# Patient Record
Sex: FEMALE | Race: WHITE | NOT HISPANIC OR LATINO
[De-identification: names, ages, dates, MRNs, and addresses within clinical notes are randomized per-mention and may not be internally consistent; named-entity substitution may affect disease eponyms.]

---

## 2017-12-12 ENCOUNTER — APPOINTMENT (OUTPATIENT)
Dept: ORTHOPEDIC SURGERY | Facility: CLINIC | Age: 82
End: 2017-12-12
Payer: MEDICARE

## 2017-12-12 VITALS — BODY MASS INDEX: 29.44 KG/M2 | WEIGHT: 160 LBS | HEIGHT: 62 IN

## 2017-12-12 DIAGNOSIS — Z78.9 OTHER SPECIFIED HEALTH STATUS: ICD-10-CM

## 2017-12-12 DIAGNOSIS — Q68.1 CONGENITAL DEFORMITY OF FINGER(S) AND HAND: ICD-10-CM

## 2017-12-12 PROCEDURE — 99203 OFFICE O/P NEW LOW 30 MIN: CPT | Mod: 25

## 2017-12-12 PROCEDURE — 20550 NJX 1 TENDON SHEATH/LIGAMENT: CPT | Mod: FA

## 2018-03-13 ENCOUNTER — APPOINTMENT (OUTPATIENT)
Dept: ORTHOPEDIC SURGERY | Facility: CLINIC | Age: 83
End: 2018-03-13
Payer: MEDICARE

## 2018-03-13 PROCEDURE — 99214 OFFICE O/P EST MOD 30 MIN: CPT

## 2018-03-30 ENCOUNTER — OUTPATIENT (OUTPATIENT)
Dept: OUTPATIENT SERVICES | Facility: HOSPITAL | Age: 83
LOS: 1 days | End: 2018-03-30
Payer: MEDICARE

## 2018-03-30 VITALS
HEIGHT: 62 IN | TEMPERATURE: 98 F | SYSTOLIC BLOOD PRESSURE: 176 MMHG | RESPIRATION RATE: 18 BRPM | OXYGEN SATURATION: 96 % | DIASTOLIC BLOOD PRESSURE: 80 MMHG | HEART RATE: 64 BPM

## 2018-03-30 DIAGNOSIS — Z01.818 ENCOUNTER FOR OTHER PREPROCEDURAL EXAMINATION: ICD-10-CM

## 2018-03-30 DIAGNOSIS — M65.312 TRIGGER THUMB, LEFT THUMB: ICD-10-CM

## 2018-03-30 DIAGNOSIS — Z98.890 OTHER SPECIFIED POSTPROCEDURAL STATES: Chronic | ICD-10-CM

## 2018-03-30 PROCEDURE — G0463: CPT

## 2018-03-30 PROCEDURE — 80048 BASIC METABOLIC PNL TOTAL CA: CPT

## 2018-03-30 PROCEDURE — 93010 ELECTROCARDIOGRAM REPORT: CPT

## 2018-03-30 PROCEDURE — 85027 COMPLETE CBC AUTOMATED: CPT

## 2018-03-30 PROCEDURE — 93005 ELECTROCARDIOGRAM TRACING: CPT

## 2018-03-30 RX ORDER — LIDOCAINE HCL 20 MG/ML
0.2 VIAL (ML) INJECTION ONCE
Qty: 0 | Refills: 0 | Status: DISCONTINUED | OUTPATIENT
Start: 2018-04-06 | End: 2018-04-21

## 2018-03-30 RX ORDER — SODIUM CHLORIDE 9 MG/ML
3 INJECTION INTRAMUSCULAR; INTRAVENOUS; SUBCUTANEOUS EVERY 8 HOURS
Qty: 0 | Refills: 0 | Status: DISCONTINUED | OUTPATIENT
Start: 2018-04-06 | End: 2018-04-21

## 2018-03-30 RX ORDER — FAMOTIDINE 10 MG/ML
0 INJECTION INTRAVENOUS
Qty: 0 | Refills: 0 | COMMUNITY

## 2018-03-30 NOTE — H&P PST ADULT - NSANTHOSAYNRD_GEN_A_CORE
No. BREA screening performed.  STOP BANG Legend: 0-2 = LOW Risk; 3-4 = INTERMEDIATE Risk; 5-8 = HIGH Risk

## 2018-03-30 NOTE — H&P PST ADULT - ATTENDING COMMENTS
The patient has chronic painful left trigger thumb that is locked in flexion and causing considerable interference with a wide range of ADL’s. Surgery for left thumb trigger finger is indicated. The patient has pain and interference with activities of daily living. While the patient may see improvement, the patient may not have complete range of motion or complete return to activities of daily living. Postoperative hand therapy, and other treatment modalities may be required The range of treatment alternatives, and the associated risks complications limitations and expectations were explained and discussed. Infection, incomplete range of motion, stiffness, pain, palmar fibromatosis are a just few of many factors reviewed and discussed with the patient. All questions have been answered. The patient has expressed acceptance and understanding of the above and has consented to surgery.the patient.

## 2018-03-30 NOTE — H&P PST ADULT - PMH
Anxiety    Discogenic low back pain    HLD (hyperlipidemia)    Hypertension    Osteoporosis    Stroke  [id number] minor

## 2018-04-05 ENCOUNTER — TRANSCRIPTION ENCOUNTER (OUTPATIENT)
Age: 83
End: 2018-04-05

## 2018-04-06 ENCOUNTER — OUTPATIENT (OUTPATIENT)
Dept: OUTPATIENT SERVICES | Facility: HOSPITAL | Age: 83
LOS: 1 days | End: 2018-04-06
Payer: MEDICARE

## 2018-04-06 ENCOUNTER — APPOINTMENT (OUTPATIENT)
Dept: ORTHOPEDIC SURGERY | Facility: HOSPITAL | Age: 83
End: 2018-04-06

## 2018-04-06 VITALS
SYSTOLIC BLOOD PRESSURE: 165 MMHG | HEART RATE: 62 BPM | TEMPERATURE: 98 F | DIASTOLIC BLOOD PRESSURE: 77 MMHG | RESPIRATION RATE: 18 BRPM

## 2018-04-06 VITALS
SYSTOLIC BLOOD PRESSURE: 153 MMHG | OXYGEN SATURATION: 98 % | TEMPERATURE: 98 F | RESPIRATION RATE: 16 BRPM | HEART RATE: 66 BPM | HEIGHT: 62 IN | DIASTOLIC BLOOD PRESSURE: 88 MMHG

## 2018-04-06 DIAGNOSIS — Z98.890 OTHER SPECIFIED POSTPROCEDURAL STATES: Chronic | ICD-10-CM

## 2018-04-06 DIAGNOSIS — M65.312 TRIGGER THUMB, LEFT THUMB: ICD-10-CM

## 2018-04-06 DIAGNOSIS — Z01.818 ENCOUNTER FOR OTHER PREPROCEDURAL EXAMINATION: ICD-10-CM

## 2018-04-06 PROCEDURE — 26055 INCISE FINGER TENDON SHEATH: CPT | Mod: FA

## 2018-04-06 RX ORDER — ACETAMINOPHEN 500 MG
1000 TABLET ORAL ONCE
Qty: 0 | Refills: 0 | Status: COMPLETED | OUTPATIENT
Start: 2018-04-06 | End: 2018-04-06

## 2018-04-06 RX ORDER — CELECOXIB 200 MG/1
200 CAPSULE ORAL ONCE
Qty: 0 | Refills: 0 | Status: COMPLETED | OUTPATIENT
Start: 2018-04-06 | End: 2018-04-06

## 2018-04-06 RX ADMIN — CELECOXIB 200 MILLIGRAM(S): 200 CAPSULE ORAL at 07:41

## 2018-04-06 RX ADMIN — Medication 1000 MILLIGRAM(S): at 07:41

## 2018-04-06 NOTE — ASU DISCHARGE PLAN (ADULT/PEDIATRIC). - NOTIFY
see instruction sheet Numbness, color, or temperature change to extremity/Fever greater than 101/Persistent Nausea and Vomiting/Bleeding that does not stop/Unable to Urinate/Pain not relieved by Medications/see instruction sheet

## 2018-04-06 NOTE — BRIEF OPERATIVE NOTE - PROCEDURE
<<-----Click on this checkbox to enter Procedure Trigger finger release  04/06/2018  L thumb  Active  PGOLD2

## 2018-04-06 NOTE — ASU DISCHARGE PLAN (ADULT/PEDIATRIC). - MEDICATION SUMMARY - MEDICATIONS TO TAKE
I will START or STAY ON the medications listed below when I get home from the hospital:    Aspir 81 oral delayed release tablet  -- 1 tab(s) by mouth once a day  -- Indication: For home med    losartan 100 mg oral tablet  -- 1 tab(s) by mouth once a day  -- Indication: For home med    simvastatin 40 mg oral tablet  -- 1 tab(s) by mouth once a day (at bedtime)  -- Indication: For home med    Metoprolol Succinate ER 50 mg oral tablet, extended release  -- 1 tab(s) by mouth once a day  -- Indication: For home med    famotidine 20 mg oral tablet  -- orally once a day pm and am of surgery  -- Indication: For home med    Calcium 600+D 600 mg-200 intl units oral tablet  -- orally once a day  -- Indication: For home med

## 2018-04-06 NOTE — ASU PATIENT PROFILE, ADULT - PMH
Anxiety    Discogenic low back pain    HLD (hyperlipidemia)    Hypertension    Osteoporosis    Stroke  [id number] minor

## 2018-04-16 ENCOUNTER — APPOINTMENT (OUTPATIENT)
Dept: ORTHOPEDIC SURGERY | Facility: CLINIC | Age: 83
End: 2018-04-16
Payer: MEDICARE

## 2018-04-16 DIAGNOSIS — M65.312 TRIGGER THUMB, LEFT THUMB: ICD-10-CM

## 2018-04-16 PROCEDURE — 99024 POSTOP FOLLOW-UP VISIT: CPT

## 2019-01-23 PROBLEM — M81.0 AGE-RELATED OSTEOPOROSIS WITHOUT CURRENT PATHOLOGICAL FRACTURE: Chronic | Status: ACTIVE | Noted: 2018-03-30

## 2019-01-23 PROBLEM — F41.9 ANXIETY DISORDER, UNSPECIFIED: Chronic | Status: ACTIVE | Noted: 2018-03-30

## 2019-01-23 PROBLEM — M51.36 OTHER INTERVERTEBRAL DISC DEGENERATION, LUMBAR REGION: Chronic | Status: ACTIVE | Noted: 2018-03-30

## 2019-01-23 PROBLEM — I10 ESSENTIAL (PRIMARY) HYPERTENSION: Chronic | Status: ACTIVE | Noted: 2018-03-30

## 2019-02-06 ENCOUNTER — APPOINTMENT (OUTPATIENT)
Dept: ORTHOPEDIC SURGERY | Facility: CLINIC | Age: 84
End: 2019-02-06
Payer: MEDICARE

## 2019-02-06 VITALS
HEIGHT: 62 IN | HEART RATE: 58 BPM | SYSTOLIC BLOOD PRESSURE: 202 MMHG | BODY MASS INDEX: 30.91 KG/M2 | WEIGHT: 168 LBS | DIASTOLIC BLOOD PRESSURE: 94 MMHG

## 2019-02-06 PROCEDURE — 72170 X-RAY EXAM OF PELVIS: CPT | Mod: 59

## 2019-02-06 PROCEDURE — 72110 X-RAY EXAM L-2 SPINE 4/>VWS: CPT

## 2019-02-06 PROCEDURE — 20610 DRAIN/INJ JOINT/BURSA W/O US: CPT | Mod: LT

## 2019-02-06 PROCEDURE — 99214 OFFICE O/P EST MOD 30 MIN: CPT | Mod: 25

## 2019-02-06 RX ORDER — NORTRIPTYLINE HYDROCHLORIDE 10 MG/1
10 CAPSULE ORAL
Qty: 30 | Refills: 0 | Status: DISCONTINUED | COMMUNITY
Start: 2017-06-30 | End: 2019-02-06

## 2019-02-06 NOTE — HISTORY OF PRESENT ILLNESS
[Worsening] : worsening [9] : a current pain level of 9/10 [Walking] : walking [Daily] : ~He/She~ states the symptoms seem to be occuring daily [Exercise Regimen] : relieved by exercise regimen [Recumbency] : relieved by recumbency [Rest] : relieved by rest [de-identified] : Patient is here today for evaluation on her chronic low back into left hip and leg pain going on acute since June 2018. Patient saw 2 orthopedist since June. Patient states had injection in her back no relief by Dr. Dent then went to [facility] saw orthopedist sent for mri lumbar spine. Patient states after walking a lot of tightness in her left thigh also when lying down in bed.

## 2019-02-06 NOTE — PHYSICAL EXAM
[Antalgic] : antalgic [Stooped] : stooped [SLR] : negative straight leg raise [LE] : Sensory: Intact in bilateral lower extremities [Knee] : patellar 2+ and symmetric bilaterally [Ankle] : ankle 2+ and symmetric bilaterally [DP] : dorsalis pedis 2+ and symmetric bilaterally [PT] : posterior tibial 2+ and symmetric bilaterally [de-identified] : The pt is awake, alert and oriented to self, place and time, is uncomfortable and in no acute distress. Inspection of neck, back and lower extremities bilaterally reveals no rashes or ecchymotic lesions.  There is no tenderness over the cervical, thoracic spine, or the upper and lower extremities musculature.Paraspinal lumbar tenderness with midline lumbar tenderness noted.  There is no sacroiliac tenderness. left greater trochanteric tenderness on the left. No atrophy or abnormal movements noted in the upper or lower extremities. There is no swelling noted in the upper or lower extremities bilaterally. No cervical lymphadenopathy noted anteriorly. No joint laxity noted in the upper and lower extremity joints bilaterally.\par Hip range of motion is degrees internal rotation 30° external rotation without pain. Full range of motion of the shoulders bilaterally with no significant pain\par There is no groin pain with hip internal rotation and a negative CRISTA test bilaterally.  [de-identified] : seen with her  and her friend [de-identified] : AP pelvis demonstrates mild degenerative changes in the hips. No acute fractures. No significant degeneration. Right-sided scoliosis and lumbar spines partially visualized.\par \par 4 views lumbar spine obtained today demonstrate 33° of right-sided lumbar scoliosis from T12-L4. Partial sacralization of the L5 vertebral body pseudoarticulation seen on the right side primarily. On the lateral projections forward posture is noted with significant degeneration L5-S1. Moderate degeneration also L4-5. Grade 1-2 spinal listhesis seen at L3-4. Grade 1 spinal listhesis also seen at L2-3. Significant degeneration seen at L2-3 L3-4. Between flex extension no dynamic instability. No acute fractures.\par \par _____________________\par \par MRI lumbar spine without contrast performed at [facility] on 129 2018 was compared by the radiologist with previous MRI from the May 21st 2015. Mild anterolisthesis of L2 on 3 and L3-L4. Right-sided lumbar scoliosis in the upper left-sided lumbar scoliosis lower spine. Multilevel disc degeneration with disc bulges at L1-2, L2-3 L3-4 and L4-5 there moderate canal narrowing L1-L2 3. Moderate to marked canal narrowing at L3-4.

## 2019-02-06 NOTE — DISCUSSION/SUMMARY
[Medication Risks Reviewed] : Medication risks reviewed [de-identified] : The patient today presents with symptoms of localizing tenderness over her left lateral thigh and greater trochanter. She has difficulty laying on that side at night. Clinically she has trochanteric bursitis and I offered her an injection of corticosteroids for this. She wanted to proceed. Under sterile conditions 40 mg of Depo-Medrol is injected her left trochanteric bursa next the basic cc solution of 1% lidocaine and 0.25% Marcaine without epinephrine.\par \par The patient also has foraminal and lateral recess stenosis with herniation of the L3-4 level and moderate to significant stenosis at the L3-4 and L2-3 levels. Talked about epidural steroid injections for this especially given her groin and anterior thigh pain localizing to the L3 and L4 nerve distributions. We will schedule an injection at her earliest convenience. She understands the injection is not helpful laminectomy may be an option for her.\par \par The patient also was prescribed gabapentin meloxicam on a trial basis. She denies any NSAID allergy and is currently taking baby aspirin  for cardiovascular prophylaxis.\par \par The patient was educated regarding their condition, treatment options as well as prescribed course of treatment. \par Risks and benefits as well as alternatives to the proposed treatment were also provided to the patient \par They were given the opportunity to have all their questions answered to their satisfaction.\par \par Vital signs were reviewed with the patient and the patient was instructed to followup with their primary care provider for further management.\par \par Healthy lifestyle recommendations were also made including a tobacco free lifestyle, proper diet, and weight control.

## 2019-02-12 ENCOUNTER — APPOINTMENT (OUTPATIENT)
Dept: ORTHOPEDIC SURGERY | Facility: HOSPITAL | Age: 84
End: 2019-02-12

## 2019-02-14 ENCOUNTER — APPOINTMENT (OUTPATIENT)
Dept: ORTHOPEDIC SURGERY | Facility: HOSPITAL | Age: 84
End: 2019-02-14

## 2019-02-14 ENCOUNTER — OUTPATIENT (OUTPATIENT)
Dept: OUTPATIENT SERVICES | Facility: HOSPITAL | Age: 84
LOS: 1 days | End: 2019-02-14
Payer: MEDICARE

## 2019-02-14 DIAGNOSIS — M43.10 SPONDYLOLISTHESIS, SITE UNSPECIFIED: ICD-10-CM

## 2019-02-14 DIAGNOSIS — M54.16 RADICULOPATHY, LUMBAR REGION: ICD-10-CM

## 2019-02-14 DIAGNOSIS — M48.061 SPINAL STENOSIS, LUMBAR REGION WITHOUT NEUROGENIC CLAUDICATION: ICD-10-CM

## 2019-02-14 DIAGNOSIS — Z98.890 OTHER SPECIFIED POSTPROCEDURAL STATES: Chronic | ICD-10-CM

## 2019-02-14 PROCEDURE — 64483 NJX AA&/STRD TFRM EPI L/S 1: CPT | Mod: LT

## 2019-02-14 PROCEDURE — 64484 NJX AA&/STRD TFRM EPI L/S EA: CPT | Mod: LT

## 2019-02-14 PROCEDURE — 77003 FLUOROGUIDE FOR SPINE INJECT: CPT

## 2019-03-13 ENCOUNTER — APPOINTMENT (OUTPATIENT)
Dept: ORTHOPEDIC SURGERY | Facility: CLINIC | Age: 84
End: 2019-03-13
Payer: MEDICARE

## 2019-03-13 VITALS
DIASTOLIC BLOOD PRESSURE: 81 MMHG | SYSTOLIC BLOOD PRESSURE: 187 MMHG | WEIGHT: 167 LBS | HEART RATE: 63 BPM | BODY MASS INDEX: 30.73 KG/M2 | HEIGHT: 62 IN

## 2019-03-13 PROCEDURE — 99214 OFFICE O/P EST MOD 30 MIN: CPT

## 2019-03-13 NOTE — HISTORY OF PRESENT ILLNESS
[Stable] : stable [4] : a current pain level of 4/10 [Walking] : walking [Daily] : ~He/She~ states the symptoms seem to be occuring daily [de-identified] : Patient is here today one month post lumbar epidural injection 2/14/19. Patient states slightly better still getting left buttock into left leg pain. Patient finished her medication. [de-identified] : lumbar injection

## 2019-03-13 NOTE — REASON FOR VISIT
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

## 2019-03-26 ENCOUNTER — OUTPATIENT (OUTPATIENT)
Dept: OUTPATIENT SERVICES | Facility: HOSPITAL | Age: 84
LOS: 1 days | End: 2019-03-26
Payer: MEDICARE

## 2019-03-26 ENCOUNTER — APPOINTMENT (OUTPATIENT)
Dept: ORTHOPEDIC SURGERY | Facility: HOSPITAL | Age: 84
End: 2019-03-26

## 2019-03-26 DIAGNOSIS — M54.16 RADICULOPATHY, LUMBAR REGION: ICD-10-CM

## 2019-03-26 DIAGNOSIS — M43.10 SPONDYLOLISTHESIS, SITE UNSPECIFIED: ICD-10-CM

## 2019-03-26 DIAGNOSIS — Z98.890 OTHER SPECIFIED POSTPROCEDURAL STATES: Chronic | ICD-10-CM

## 2019-03-26 DIAGNOSIS — M48.061 SPINAL STENOSIS, LUMBAR REGION WITHOUT NEUROGENIC CLAUDICATION: ICD-10-CM

## 2019-03-26 PROCEDURE — 64483 NJX AA&/STRD TFRM EPI L/S 1: CPT | Mod: LT

## 2019-03-26 PROCEDURE — 77003 FLUOROGUIDE FOR SPINE INJECT: CPT

## 2019-03-26 PROCEDURE — 64484 NJX AA&/STRD TFRM EPI L/S EA: CPT | Mod: LT

## 2019-04-08 ENCOUNTER — APPOINTMENT (OUTPATIENT)
Dept: ORTHOPEDIC SURGERY | Facility: CLINIC | Age: 84
End: 2019-04-08
Payer: MEDICARE

## 2019-04-08 VITALS — HEART RATE: 65 BPM | SYSTOLIC BLOOD PRESSURE: 177 MMHG | DIASTOLIC BLOOD PRESSURE: 84 MMHG

## 2019-04-08 PROCEDURE — 99214 OFFICE O/P EST MOD 30 MIN: CPT

## 2019-05-06 ENCOUNTER — CHART COPY (OUTPATIENT)
Age: 84
End: 2019-05-06

## 2019-05-15 ENCOUNTER — OUTPATIENT (OUTPATIENT)
Dept: OUTPATIENT SERVICES | Facility: HOSPITAL | Age: 84
LOS: 1 days | End: 2019-05-15
Payer: MEDICARE

## 2019-05-15 VITALS
WEIGHT: 164.91 LBS | TEMPERATURE: 98 F | OXYGEN SATURATION: 96 % | HEART RATE: 68 BPM | HEIGHT: 62 IN | RESPIRATION RATE: 15 BRPM | DIASTOLIC BLOOD PRESSURE: 83 MMHG | SYSTOLIC BLOOD PRESSURE: 176 MMHG

## 2019-05-15 DIAGNOSIS — Z01.818 ENCOUNTER FOR OTHER PREPROCEDURAL EXAMINATION: ICD-10-CM

## 2019-05-15 DIAGNOSIS — M54.16 RADICULOPATHY, LUMBAR REGION: ICD-10-CM

## 2019-05-15 DIAGNOSIS — M51.36 OTHER INTERVERTEBRAL DISC DEGENERATION, LUMBAR REGION: ICD-10-CM

## 2019-05-15 DIAGNOSIS — M43.10 SPONDYLOLISTHESIS, SITE UNSPECIFIED: ICD-10-CM

## 2019-05-15 DIAGNOSIS — Z98.49 CATARACT EXTRACTION STATUS, UNSPECIFIED EYE: Chronic | ICD-10-CM

## 2019-05-15 DIAGNOSIS — Z98.890 OTHER SPECIFIED POSTPROCEDURAL STATES: Chronic | ICD-10-CM

## 2019-05-15 LAB
ALBUMIN SERPL ELPH-MCNC: 3.9 G/DL — SIGNIFICANT CHANGE UP (ref 3.3–5)
ALLERGY+IMMUNOLOGY DIAG STUDY NOTE: SIGNIFICANT CHANGE UP
ALP SERPL-CCNC: 59 U/L — SIGNIFICANT CHANGE UP (ref 30–120)
ALT FLD-CCNC: 24 U/L DA — SIGNIFICANT CHANGE UP (ref 10–60)
ANION GAP SERPL CALC-SCNC: 9 MMOL/L — SIGNIFICANT CHANGE UP (ref 5–17)
APTT BLD: 31.5 SEC — SIGNIFICANT CHANGE UP (ref 28.5–37)
AST SERPL-CCNC: 30 U/L — SIGNIFICANT CHANGE UP (ref 10–40)
BILIRUB SERPL-MCNC: 0.5 MG/DL — SIGNIFICANT CHANGE UP (ref 0.2–1.2)
BUN SERPL-MCNC: 28 MG/DL — HIGH (ref 7–23)
CALCIUM SERPL-MCNC: 9.3 MG/DL — SIGNIFICANT CHANGE UP (ref 8.4–10.5)
CHLORIDE SERPL-SCNC: 110 MMOL/L — HIGH (ref 96–108)
CO2 SERPL-SCNC: 29 MMOL/L — SIGNIFICANT CHANGE UP (ref 22–31)
CREAT SERPL-MCNC: 0.64 MG/DL — SIGNIFICANT CHANGE UP (ref 0.5–1.3)
GLUCOSE SERPL-MCNC: 106 MG/DL — HIGH (ref 70–99)
HCT VFR BLD CALC: 41.2 % — SIGNIFICANT CHANGE UP (ref 34.5–45)
HGB BLD-MCNC: 13.8 G/DL — SIGNIFICANT CHANGE UP (ref 11.5–15.5)
INR BLD: 0.97 RATIO — SIGNIFICANT CHANGE UP (ref 0.88–1.16)
MCHC RBC-ENTMCNC: 33.3 PG — SIGNIFICANT CHANGE UP (ref 27–34)
MCHC RBC-ENTMCNC: 33.5 GM/DL — SIGNIFICANT CHANGE UP (ref 32–36)
MCV RBC AUTO: 99.3 FL — SIGNIFICANT CHANGE UP (ref 80–100)
NRBC # BLD: 0 /100 WBCS — SIGNIFICANT CHANGE UP (ref 0–0)
PLATELET # BLD AUTO: 190 K/UL — SIGNIFICANT CHANGE UP (ref 150–400)
POTASSIUM SERPL-MCNC: 4.5 MMOL/L — SIGNIFICANT CHANGE UP (ref 3.5–5.3)
POTASSIUM SERPL-SCNC: 4.5 MMOL/L — SIGNIFICANT CHANGE UP (ref 3.5–5.3)
PROT SERPL-MCNC: 7.2 G/DL — SIGNIFICANT CHANGE UP (ref 6–8.3)
PROTHROM AB SERPL-ACNC: 10.6 SEC — SIGNIFICANT CHANGE UP (ref 10–12.9)
RBC # BLD: 4.15 M/UL — SIGNIFICANT CHANGE UP (ref 3.8–5.2)
RBC # FLD: 13.3 % — SIGNIFICANT CHANGE UP (ref 10.3–14.5)
SODIUM SERPL-SCNC: 148 MMOL/L — HIGH (ref 135–145)
WBC # BLD: 4.29 K/UL — SIGNIFICANT CHANGE UP (ref 3.8–10.5)
WBC # FLD AUTO: 4.29 K/UL — SIGNIFICANT CHANGE UP (ref 3.8–10.5)

## 2019-05-15 PROCEDURE — G0463: CPT

## 2019-05-15 PROCEDURE — 86850 RBC ANTIBODY SCREEN: CPT

## 2019-05-15 PROCEDURE — 36415 COLL VENOUS BLD VENIPUNCTURE: CPT

## 2019-05-15 PROCEDURE — 85610 PROTHROMBIN TIME: CPT

## 2019-05-15 PROCEDURE — 85730 THROMBOPLASTIN TIME PARTIAL: CPT

## 2019-05-15 PROCEDURE — 93010 ELECTROCARDIOGRAM REPORT: CPT

## 2019-05-15 PROCEDURE — 80053 COMPREHEN METABOLIC PANEL: CPT

## 2019-05-15 PROCEDURE — 85027 COMPLETE CBC AUTOMATED: CPT

## 2019-05-15 PROCEDURE — 86900 BLOOD TYPING SEROLOGIC ABO: CPT

## 2019-05-15 PROCEDURE — 86901 BLOOD TYPING SEROLOGIC RH(D): CPT

## 2019-05-15 PROCEDURE — 93005 ELECTROCARDIOGRAM TRACING: CPT

## 2019-05-15 RX ORDER — FAMOTIDINE 10 MG/ML
0 INJECTION INTRAVENOUS
Qty: 0 | Refills: 0 | DISCHARGE

## 2019-05-15 NOTE — H&P PST ADULT - NSICDXPASTMEDICALHX_GEN_ALL_CORE_FT
PAST MEDICAL HISTORY:  Anxiety     Discogenic low back pain     HLD (hyperlipidemia)     Hypertension     Lumbar radiculopathy     Osteoporosis     Stroke [id number] minor

## 2019-05-15 NOTE — H&P PST ADULT - HISTORY OF PRESENT ILLNESS
82 yo female presents to PST at [facility] for scheduled lumbar laminectomy L3-4 left L2-3 decompression on 6/4/19 with Dr Mendez.   Reports lower back pain with radiation to [id number] left leg since January 2019 for which she has tried epidural steroid injections.  Reports worst pain with weight bearing and sitting.

## 2019-05-15 NOTE — H&P PST ADULT - NEGATIVE ENMT SYMPTOMS
no dysphagia/no gum bleeding/no dry mouth/no throat pain/no abnormal taste sensation/no sinus symptoms/no post-nasal discharge/no ear pain/no vertigo/no hearing difficulty/no nasal congestion/no nose bleeds/no recurrent cold sores/no nasal obstruction/no tinnitus/no nasal discharge

## 2019-05-15 NOTE — H&P PST ADULT - NSICDXPASTSURGICALHX_GEN_ALL_CORE_FT
PAST SURGICAL HISTORY:  H/O carotid endarterectomy Lt 2016    History of cataract extraction bilateral, 2018

## 2019-05-15 NOTE — H&P PST ADULT - NSICDXPROBLEM_GEN_ALL_CORE_FT
PROBLEM DIAGNOSES  Problem: Discogenic low back pain  Assessment and Plan: Lumbar laminectomy L3-4 left and L2-3 decompression on 6/4/19  Diagnostics will be forwarded for medical clearance  Spoke to PCP; he will give instructions for aspirin 81 mg  Instructions were reviewed and signed  Best wishes offered PROBLEM DIAGNOSES  Problem: Discogenic low back pain  Assessment and Plan: Lumbar laminectomy L3-4 left and L2-3 decompression on 6/4/19  Diagnostics will be forwarded for medical clearance  Spoke to NAEEM Stephens related to aspirin therapy  Spoke to PCP; he will give instructions for aspirin 81 mg, states no further cardiac testing necessary  Instructions were reviewed and signed  Best wishes offered

## 2019-05-15 NOTE — H&P PST ADULT - ASSESSMENT
LUMBAR LAMINECTOMY L3-4 LEFT L2-3 DECOMPRESSION ON 6/4/19 WITH DR HENRY AT [facility]

## 2019-05-30 NOTE — PROVIDER CONTACT NOTE (OTHER) - ASSESSMENT
The Spine Pre-Operative Education packet was given to the patient on 4/8/19. The patient and I reviewed the information included in the packet. All her questions were answered and she gave a clear understanding of the instructions. She was advised to call the office at any time with further questions or concerns.

## 2019-05-31 RX ORDER — SODIUM CHLORIDE 9 MG/ML
1000 INJECTION, SOLUTION INTRAVENOUS
Refills: 0 | Status: DISCONTINUED | OUTPATIENT
Start: 2019-06-04 | End: 2019-06-07

## 2019-06-03 ENCOUNTER — TRANSCRIPTION ENCOUNTER (OUTPATIENT)
Age: 84
End: 2019-06-03

## 2019-06-03 PROBLEM — M54.16 RADICULOPATHY, LUMBAR REGION: Chronic | Status: ACTIVE | Noted: 2019-05-15

## 2019-06-04 ENCOUNTER — APPOINTMENT (OUTPATIENT)
Dept: ORTHOPEDIC SURGERY | Facility: HOSPITAL | Age: 84
End: 2019-06-04

## 2019-06-04 ENCOUNTER — RESULT REVIEW (OUTPATIENT)
Age: 84
End: 2019-06-04

## 2019-06-04 ENCOUNTER — INPATIENT (INPATIENT)
Facility: HOSPITAL | Age: 84
LOS: 2 days | Discharge: HOME | DRG: 517 | End: 2019-06-07
Attending: ORTHOPAEDIC SURGERY | Admitting: ORTHOPAEDIC SURGERY
Payer: MEDICARE

## 2019-06-04 VITALS
OXYGEN SATURATION: 99 % | TEMPERATURE: 98 F | RESPIRATION RATE: 14 BRPM | SYSTOLIC BLOOD PRESSURE: 156 MMHG | HEART RATE: 71 BPM | HEIGHT: 65 IN | DIASTOLIC BLOOD PRESSURE: 75 MMHG | WEIGHT: 165.79 LBS

## 2019-06-04 DIAGNOSIS — M54.16 RADICULOPATHY, LUMBAR REGION: ICD-10-CM

## 2019-06-04 DIAGNOSIS — M43.10 SPONDYLOLISTHESIS, SITE UNSPECIFIED: ICD-10-CM

## 2019-06-04 DIAGNOSIS — Z98.890 OTHER SPECIFIED POSTPROCEDURAL STATES: Chronic | ICD-10-CM

## 2019-06-04 DIAGNOSIS — Z98.49 CATARACT EXTRACTION STATUS, UNSPECIFIED EYE: Chronic | ICD-10-CM

## 2019-06-04 LAB
ABO RH CONFIRMATION: SIGNIFICANT CHANGE UP
SODIUM SERPL-SCNC: 143 MMOL/L — SIGNIFICANT CHANGE UP (ref 135–145)

## 2019-06-04 PROCEDURE — 88311 DECALCIFY TISSUE: CPT | Mod: 26

## 2019-06-04 PROCEDURE — 88304 TISSUE EXAM BY PATHOLOGIST: CPT | Mod: 26

## 2019-06-04 RX ORDER — ASPIRIN/CALCIUM CARB/MAGNESIUM 324 MG
81 TABLET ORAL DAILY
Refills: 0 | Status: DISCONTINUED | OUTPATIENT
Start: 2019-06-06 | End: 2019-06-07

## 2019-06-04 RX ORDER — HYDROMORPHONE HYDROCHLORIDE 2 MG/ML
2 INJECTION INTRAMUSCULAR; INTRAVENOUS; SUBCUTANEOUS
Refills: 0 | Status: DISCONTINUED | OUTPATIENT
Start: 2019-06-04 | End: 2019-06-07

## 2019-06-04 RX ORDER — DOCUSATE SODIUM 100 MG
100 CAPSULE ORAL THREE TIMES A DAY
Refills: 0 | Status: DISCONTINUED | OUTPATIENT
Start: 2019-06-04 | End: 2019-06-07

## 2019-06-04 RX ORDER — HYDROMORPHONE HYDROCHLORIDE 2 MG/ML
0.5 INJECTION INTRAMUSCULAR; INTRAVENOUS; SUBCUTANEOUS
Refills: 0 | Status: DISCONTINUED | OUTPATIENT
Start: 2019-06-04 | End: 2019-06-07

## 2019-06-04 RX ORDER — SIMVASTATIN 20 MG/1
40 TABLET, FILM COATED ORAL AT BEDTIME
Refills: 0 | Status: DISCONTINUED | OUTPATIENT
Start: 2019-06-04 | End: 2019-06-07

## 2019-06-04 RX ORDER — CEFAZOLIN SODIUM 1 G
2000 VIAL (EA) INJECTION EVERY 8 HOURS
Refills: 0 | Status: COMPLETED | OUTPATIENT
Start: 2019-06-04 | End: 2019-06-05

## 2019-06-04 RX ORDER — APREPITANT 80 MG/1
40 CAPSULE ORAL ONCE
Refills: 0 | Status: COMPLETED | OUTPATIENT
Start: 2019-06-04 | End: 2019-06-04

## 2019-06-04 RX ORDER — ACETAMINOPHEN 500 MG
1000 TABLET ORAL EVERY 8 HOURS
Refills: 0 | Status: DISCONTINUED | OUTPATIENT
Start: 2019-06-05 | End: 2019-06-07

## 2019-06-04 RX ORDER — SENNA PLUS 8.6 MG/1
2 TABLET ORAL AT BEDTIME
Refills: 0 | Status: DISCONTINUED | OUTPATIENT
Start: 2019-06-04 | End: 2019-06-07

## 2019-06-04 RX ORDER — LOSARTAN POTASSIUM 100 MG/1
100 TABLET, FILM COATED ORAL DAILY
Refills: 0 | Status: DISCONTINUED | OUTPATIENT
Start: 2019-06-06 | End: 2019-06-07

## 2019-06-04 RX ORDER — CEFAZOLIN SODIUM 1 G
2000 VIAL (EA) INJECTION ONCE
Refills: 0 | Status: COMPLETED | OUTPATIENT
Start: 2019-06-04 | End: 2019-06-04

## 2019-06-04 RX ORDER — DIAZEPAM 5 MG
2 TABLET ORAL EVERY 8 HOURS
Refills: 0 | Status: DISCONTINUED | OUTPATIENT
Start: 2019-06-04 | End: 2019-06-07

## 2019-06-04 RX ORDER — METOPROLOL TARTRATE 50 MG
50 TABLET ORAL DAILY
Refills: 0 | Status: DISCONTINUED | OUTPATIENT
Start: 2019-06-04 | End: 2019-06-07

## 2019-06-04 RX ORDER — HYDROMORPHONE HYDROCHLORIDE 2 MG/ML
4 INJECTION INTRAMUSCULAR; INTRAVENOUS; SUBCUTANEOUS
Refills: 0 | Status: DISCONTINUED | OUTPATIENT
Start: 2019-06-04 | End: 2019-06-07

## 2019-06-04 RX ORDER — HYDROMORPHONE HYDROCHLORIDE 2 MG/ML
0.5 INJECTION INTRAMUSCULAR; INTRAVENOUS; SUBCUTANEOUS
Refills: 0 | Status: DISCONTINUED | OUTPATIENT
Start: 2019-06-04 | End: 2019-06-04

## 2019-06-04 RX ORDER — ACETAMINOPHEN 500 MG
1000 TABLET ORAL ONCE
Refills: 0 | Status: COMPLETED | OUTPATIENT
Start: 2019-06-04 | End: 2019-06-04

## 2019-06-04 RX ORDER — ONDANSETRON 8 MG/1
4 TABLET, FILM COATED ORAL ONCE
Refills: 0 | Status: DISCONTINUED | OUTPATIENT
Start: 2019-06-04 | End: 2019-06-04

## 2019-06-04 RX ORDER — SODIUM CHLORIDE 9 MG/ML
1000 INJECTION, SOLUTION INTRAVENOUS
Refills: 0 | Status: DISCONTINUED | OUTPATIENT
Start: 2019-06-04 | End: 2019-06-04

## 2019-06-04 RX ORDER — ACETAMINOPHEN 500 MG
1000 TABLET ORAL EVERY 6 HOURS
Refills: 0 | Status: COMPLETED | OUTPATIENT
Start: 2019-06-04 | End: 2019-06-05

## 2019-06-04 RX ORDER — MAGNESIUM HYDROXIDE 400 MG/1
30 TABLET, CHEWABLE ORAL EVERY 12 HOURS
Refills: 0 | Status: DISCONTINUED | OUTPATIENT
Start: 2019-06-04 | End: 2019-06-07

## 2019-06-04 RX ADMIN — SODIUM CHLORIDE 75 MILLILITER(S): 9 INJECTION, SOLUTION INTRAVENOUS at 10:38

## 2019-06-04 RX ADMIN — HYDROMORPHONE HYDROCHLORIDE 0.5 MILLIGRAM(S): 2 INJECTION INTRAMUSCULAR; INTRAVENOUS; SUBCUTANEOUS at 17:47

## 2019-06-04 RX ADMIN — SIMVASTATIN 40 MILLIGRAM(S): 20 TABLET, FILM COATED ORAL at 22:01

## 2019-06-04 RX ADMIN — Medication 100 MILLIGRAM(S): at 21:38

## 2019-06-04 RX ADMIN — SODIUM CHLORIDE 100 MILLILITER(S): 9 INJECTION, SOLUTION INTRAVENOUS at 17:15

## 2019-06-04 RX ADMIN — APREPITANT 40 MILLIGRAM(S): 80 CAPSULE ORAL at 10:37

## 2019-06-04 RX ADMIN — HYDROMORPHONE HYDROCHLORIDE 0.5 MILLIGRAM(S): 2 INJECTION INTRAMUSCULAR; INTRAVENOUS; SUBCUTANEOUS at 17:17

## 2019-06-04 RX ADMIN — Medication 1000 MILLIGRAM(S): at 22:15

## 2019-06-04 RX ADMIN — Medication 400 MILLIGRAM(S): at 21:38

## 2019-06-04 NOTE — BRIEF OPERATIVE NOTE - NSICDXBRIEFPROCEDURE_GEN_ALL_CORE_FT
PROCEDURES:  Laminectomy, spine, lumbar, with discectomy 04-Jun-2019 16:12:00 L3-4 Left discectomy , L2-3Left Chung Bryan

## 2019-06-04 NOTE — BRIEF OPERATIVE NOTE - NSICDXBRIEFPOSTOP_GEN_ALL_CORE_FT
POST-OP DIAGNOSIS:  Lumbar stenosis 04-Jun-2019 16:15:26 L2-3, L3-4 Chung Bryan  Disc displacement, lumbar 04-Jun-2019 16:15:05 L3-4 Left Chung Bryan

## 2019-06-04 NOTE — BRIEF OPERATIVE NOTE - NSICDXBRIEFPREOP_GEN_ALL_CORE_FT
PRE-OP DIAGNOSIS:  Lumbar stenosis 04-Jun-2019 16:14:18 L2-3, L3-4 Chung Bryan  Disc displacement, lumbar 04-Jun-2019 16:13:51 L3-4 left Chung Bryan

## 2019-06-04 NOTE — ASU PATIENT PROFILE, ADULT - PMH
Anxiety    Discogenic low back pain    HLD (hyperlipidemia)    Hypertension    Lumbar radiculopathy    Osteoporosis    Stroke  2013 minor

## 2019-06-05 DIAGNOSIS — E78.5 HYPERLIPIDEMIA, UNSPECIFIED: ICD-10-CM

## 2019-06-05 DIAGNOSIS — F41.9 ANXIETY DISORDER, UNSPECIFIED: ICD-10-CM

## 2019-06-05 DIAGNOSIS — M54.16 RADICULOPATHY, LUMBAR REGION: ICD-10-CM

## 2019-06-05 DIAGNOSIS — I10 ESSENTIAL (PRIMARY) HYPERTENSION: ICD-10-CM

## 2019-06-05 LAB
ANION GAP SERPL CALC-SCNC: 5 MMOL/L — SIGNIFICANT CHANGE UP (ref 5–17)
BASOPHILS # BLD AUTO: 0.01 K/UL — SIGNIFICANT CHANGE UP (ref 0–0.2)
BASOPHILS NFR BLD AUTO: 0.2 % — SIGNIFICANT CHANGE UP (ref 0–2)
BUN SERPL-MCNC: 23 MG/DL — SIGNIFICANT CHANGE UP (ref 7–23)
CALCIUM SERPL-MCNC: 9.1 MG/DL — SIGNIFICANT CHANGE UP (ref 8.4–10.5)
CHLORIDE SERPL-SCNC: 103 MMOL/L — SIGNIFICANT CHANGE UP (ref 96–108)
CO2 SERPL-SCNC: 31 MMOL/L — SIGNIFICANT CHANGE UP (ref 22–31)
CREAT SERPL-MCNC: 0.81 MG/DL — SIGNIFICANT CHANGE UP (ref 0.5–1.3)
EOSINOPHIL # BLD AUTO: 0 K/UL — SIGNIFICANT CHANGE UP (ref 0–0.5)
EOSINOPHIL NFR BLD AUTO: 0 % — SIGNIFICANT CHANGE UP (ref 0–6)
GLUCOSE SERPL-MCNC: 124 MG/DL — HIGH (ref 70–99)
HCT VFR BLD CALC: 38.3 % — SIGNIFICANT CHANGE UP (ref 34.5–45)
HGB BLD-MCNC: 12.8 G/DL — SIGNIFICANT CHANGE UP (ref 11.5–15.5)
IMM GRANULOCYTES NFR BLD AUTO: 0.4 % — SIGNIFICANT CHANGE UP (ref 0–1.5)
LYMPHOCYTES # BLD AUTO: 0.7 K/UL — LOW (ref 1–3.3)
LYMPHOCYTES # BLD AUTO: 12.3 % — LOW (ref 13–44)
MCHC RBC-ENTMCNC: 32.7 PG — SIGNIFICANT CHANGE UP (ref 27–34)
MCHC RBC-ENTMCNC: 33.4 GM/DL — SIGNIFICANT CHANGE UP (ref 32–36)
MCV RBC AUTO: 97.7 FL — SIGNIFICANT CHANGE UP (ref 80–100)
MONOCYTES # BLD AUTO: 0.15 K/UL — SIGNIFICANT CHANGE UP (ref 0–0.9)
MONOCYTES NFR BLD AUTO: 2.6 % — SIGNIFICANT CHANGE UP (ref 2–14)
NEUTROPHILS # BLD AUTO: 4.81 K/UL — SIGNIFICANT CHANGE UP (ref 1.8–7.4)
NEUTROPHILS NFR BLD AUTO: 84.5 % — HIGH (ref 43–77)
NRBC # BLD: 0 /100 WBCS — SIGNIFICANT CHANGE UP (ref 0–0)
PLATELET # BLD AUTO: 181 K/UL — SIGNIFICANT CHANGE UP (ref 150–400)
POTASSIUM SERPL-MCNC: 4.3 MMOL/L — SIGNIFICANT CHANGE UP (ref 3.5–5.3)
POTASSIUM SERPL-SCNC: 4.3 MMOL/L — SIGNIFICANT CHANGE UP (ref 3.5–5.3)
RBC # BLD: 3.92 M/UL — SIGNIFICANT CHANGE UP (ref 3.8–5.2)
RBC # FLD: 12.5 % — SIGNIFICANT CHANGE UP (ref 10.3–14.5)
SODIUM SERPL-SCNC: 139 MMOL/L — SIGNIFICANT CHANGE UP (ref 135–145)
WBC # BLD: 5.69 K/UL — SIGNIFICANT CHANGE UP (ref 3.8–10.5)
WBC # FLD AUTO: 5.69 K/UL — SIGNIFICANT CHANGE UP (ref 3.8–10.5)

## 2019-06-05 PROCEDURE — 99222 1ST HOSP IP/OBS MODERATE 55: CPT

## 2019-06-05 RX ADMIN — Medication 1000 MILLIGRAM(S): at 03:30

## 2019-06-05 RX ADMIN — HYDROMORPHONE HYDROCHLORIDE 0.5 MILLIGRAM(S): 2 INJECTION INTRAMUSCULAR; INTRAVENOUS; SUBCUTANEOUS at 00:02

## 2019-06-05 RX ADMIN — Medication 400 MILLIGRAM(S): at 02:53

## 2019-06-05 RX ADMIN — HYDROMORPHONE HYDROCHLORIDE 2 MILLIGRAM(S): 2 INJECTION INTRAMUSCULAR; INTRAVENOUS; SUBCUTANEOUS at 22:30

## 2019-06-05 RX ADMIN — HYDROMORPHONE HYDROCHLORIDE 2 MILLIGRAM(S): 2 INJECTION INTRAMUSCULAR; INTRAVENOUS; SUBCUTANEOUS at 05:47

## 2019-06-05 RX ADMIN — Medication 100 MILLIGRAM(S): at 17:53

## 2019-06-05 RX ADMIN — Medication 100 MILLIGRAM(S): at 21:59

## 2019-06-05 RX ADMIN — HYDROMORPHONE HYDROCHLORIDE 2 MILLIGRAM(S): 2 INJECTION INTRAMUSCULAR; INTRAVENOUS; SUBCUTANEOUS at 06:17

## 2019-06-05 RX ADMIN — Medication 1000 MILLIGRAM(S): at 09:03

## 2019-06-05 RX ADMIN — Medication 100 MILLIGRAM(S): at 05:48

## 2019-06-05 RX ADMIN — Medication 1000 MILLIGRAM(S): at 22:00

## 2019-06-05 RX ADMIN — Medication 1000 MILLIGRAM(S): at 22:01

## 2019-06-05 RX ADMIN — Medication 50 MILLIGRAM(S): at 05:47

## 2019-06-05 RX ADMIN — HYDROMORPHONE HYDROCHLORIDE 2 MILLIGRAM(S): 2 INJECTION INTRAMUSCULAR; INTRAVENOUS; SUBCUTANEOUS at 22:00

## 2019-06-05 RX ADMIN — Medication 1000 MILLIGRAM(S): at 17:52

## 2019-06-05 RX ADMIN — HYDROMORPHONE HYDROCHLORIDE 0.5 MILLIGRAM(S): 2 INJECTION INTRAMUSCULAR; INTRAVENOUS; SUBCUTANEOUS at 00:17

## 2019-06-05 RX ADMIN — Medication 400 MILLIGRAM(S): at 09:03

## 2019-06-05 RX ADMIN — Medication 1000 MILLIGRAM(S): at 17:54

## 2019-06-05 RX ADMIN — SIMVASTATIN 40 MILLIGRAM(S): 20 TABLET, FILM COATED ORAL at 22:00

## 2019-06-05 RX ADMIN — Medication 2 MILLIGRAM(S): at 17:56

## 2019-06-05 RX ADMIN — HYDROMORPHONE HYDROCHLORIDE 2 MILLIGRAM(S): 2 INJECTION INTRAMUSCULAR; INTRAVENOUS; SUBCUTANEOUS at 12:30

## 2019-06-05 RX ADMIN — SENNA PLUS 2 TABLET(S): 8.6 TABLET ORAL at 21:59

## 2019-06-05 RX ADMIN — HYDROMORPHONE HYDROCHLORIDE 2 MILLIGRAM(S): 2 INJECTION INTRAMUSCULAR; INTRAVENOUS; SUBCUTANEOUS at 11:36

## 2019-06-05 NOTE — OCCUPATIONAL THERAPY INITIAL EVALUATION ADULT - NS ASR FOLLOW COMMAND OT EVAL
Patient educated regarding fall prevention and home/hospital safety. Pt educated on use of AE/DME recommended for safety & the need to call for assistance. Patient with good understanding. Role of OT and patient goals discussed. Patient educated regarding diagnosis specific precautions and provided with educational folder including goals, expectations and ther ex. Pt educated on role and goal of OT. Discharge planning and recommendations reviewed with the patient. Case management/ Social work notified ./100% of the time

## 2019-06-05 NOTE — CONSULT NOTE ADULT - PROBLEM SELECTOR RECOMMENDATION 9
s/p lumbar laminectomy L3-4 left L2-3 decompression on 6/4/19  pt/ot  pain control  encourage ambulation, incentive spirometer  dvt prophalxis per ortho

## 2019-06-05 NOTE — CONSULT NOTE ADULT - SUBJECTIVE AND OBJECTIVE BOX
82 yo female with pmh of htn, hld, anxiety, s/p lumbar laminectomy L3-4 left L2-3 decompression on 6/4/19 with Dr Mendez.   complaining of mild pain and discomfort       Allergies/Medications:   Allergies:        Allergies:  	No Known Allergies:     Home Medications:   * Incomplete Medication History as of 15-May-2019 09:05 documented in Structured Notes  · 	losartan 100 mg oral tablet: Last Dose Taken:  , 1 tab(s) orally once a day  · 	simvastatin 40 mg oral tablet: Last Dose Taken:  , 1 tab(s) orally once a day (at bedtime)  · 	Metoprolol Succinate ER 50 mg oral tablet, extended release: Last Dose Taken:  , 1 tab(s) orally once a day  · 	Aspir 81 oral delayed release tablet: Last Dose Taken:  , 1 tab(s) orally once a day  · 	Calcium 600+D 600 mg-200 intl units oral tablet: Last Dose Taken:  , orally once a day    .    PMH/PSH/FH/SH:    Past Medical, Past Surgical, and Family History:  PAST MEDICAL HISTORY:  Anxiety     Discogenic low back pain     HLD (hyperlipidemia)     Hypertension     Lumbar radiculopathy     Osteoporosis     Stroke [id number] minor.     PAST SURGICAL HISTORY:  H/O carotid endarterectomy Lt 2016    History of cataract extraction bilateral, 2018.     FAMILY HISTORY:  No pertinent family history in first degree relatives.     No Pertinent Family History in first degree relatives of: cancer.      Review of Systems:   · Negative General Symptoms	no malaise; no fatigue	  · Negative Skin Symptoms	no rash; no itching; no dryness; no brittle nails; no pitted nails; no hair loss	  · Negative Ophthalmologic Symptoms	no diplopia; no photophobia; no loss of vision L; no loss of vision R	  · Negative ENMT Symptoms	no hearing difficulty; no ear pain; no tinnitus; no vertigo; no sinus symptoms; no nasal congestion; no nasal discharge; no nasal obstruction; no post-nasal discharge; no nose bleeds; no recurrent cold sores; no abnormal taste sensation; no gum bleeding; no dry mouth; no throat pain; no dysphagia	  · Negative Respiratory and Thorax Symptoms	no wheezing; no dyspnea; no cough	  · Negative Cardiovascular Symptoms	no chest pain; no palpitations; no dyspnea on exertion	  · Negative Gastrointestinal Symptoms	no change in bowel habits; no abdominal pain	  · Negative General Genitourinary Symptoms	no dysuria; normal urinary frequency	  · Negative Female-Specific Symptoms	no abnormal vaginal bleeding; no pelvic pain	  · Negative Musculoskeletal Symptoms	no joint swelling; no neck pain	  · Musculoskeletal Symptoms	back pain	  · Location of Back Pain	lumbar spine	  · Musculoskeletal Comments	left leg	  · Negative Neurological Symptoms	no weakness; no tremors; no vertigo; no loss of sensation; no difficulty walking; no headache	  · Negative Psychiatric Symptoms	no depression; no anxiety; no insomnia; no memory loss; no paranoia; no mood swings; no agitation	  · Negative Hematology Symptoms	no gum bleeding; no nose bleeding; no skin lumps	  · Negative Lymphatic Symptoms	no enlarged lymph nodes; no tender lymph nodes; no swelling of extremity	  · Negative Endocrine Symptoms	no cold intolerance; no heat intolerance	  · Negative Allergy Types	no outdoor environmental allergies; no indoor environmental allergies; no reactions to medicines; no reactions to food	  · Negative Immunological Symptoms	no recurring infections; no persistent infections	           Physical Exam:  · Constitutional	Well-developed, well nourished	  · Eyes	EOMI; PERRL; no drainage or redness	  · ENMT	No oral lesions; no gross abnormalities	  · Neck	No bruits; no thyromegaly or nodules	  · Breasts	not examined	  · Back	detailed exam	  · Back Details	vertebral tenderness	  · Vertebral Tenderness	location, lumbar	  · Back Comments	radiation to anterior left leg	  · Respiratory	Breath Sounds equal & clear to percussion & auscultation, no accessory muscle use	  · Cardiovascular	Regular rate & rhythm, normal S1, S2; no murmurs, gallops or rubs; no S3, S4	  · Gastrointestinal	Soft, non-tender, no hepatosplenomegaly, normal bowel sounds	  · Genitourinary	not examined	  · Rectal	not examined	  · Extremities	No cyanosis, clubbing or edema	  · Vascular	Equal and normal pulses (carotid, femoral, dorsalis pedis)	  · Neurological	Alert & oriented; no sensory, motor or coordination deficits, normal reflexes

## 2019-06-05 NOTE — CONSULT NOTE ADULT - ASSESSMENT
82 yo female with pmh of htn, hld, anxiety, s/p lumbar laminectomy L3-4 left L2-3 decompression on 6/4/19   complaining of mild pain and discomfort

## 2019-06-06 PROCEDURE — 99232 SBSQ HOSP IP/OBS MODERATE 35: CPT

## 2019-06-06 RX ORDER — ACETAMINOPHEN 500 MG
2 TABLET ORAL
Qty: 0 | Refills: 0 | DISCHARGE
Start: 2019-06-06

## 2019-06-06 RX ORDER — DEXAMETHASONE 0.5 MG/5ML
6 ELIXIR ORAL EVERY 8 HOURS
Refills: 0 | Status: COMPLETED | OUTPATIENT
Start: 2019-06-06 | End: 2019-06-07

## 2019-06-06 RX ORDER — SENNA PLUS 8.6 MG/1
2 TABLET ORAL
Qty: 0 | Refills: 0 | DISCHARGE
Start: 2019-06-06

## 2019-06-06 RX ORDER — DOCUSATE SODIUM 100 MG
1 CAPSULE ORAL
Qty: 0 | Refills: 0 | DISCHARGE
Start: 2019-06-06

## 2019-06-06 RX ADMIN — HYDROMORPHONE HYDROCHLORIDE 4 MILLIGRAM(S): 2 INJECTION INTRAMUSCULAR; INTRAVENOUS; SUBCUTANEOUS at 19:13

## 2019-06-06 RX ADMIN — Medication 1000 MILLIGRAM(S): at 11:56

## 2019-06-06 RX ADMIN — HYDROMORPHONE HYDROCHLORIDE 4 MILLIGRAM(S): 2 INJECTION INTRAMUSCULAR; INTRAVENOUS; SUBCUTANEOUS at 06:10

## 2019-06-06 RX ADMIN — Medication 1000 MILLIGRAM(S): at 06:11

## 2019-06-06 RX ADMIN — HYDROMORPHONE HYDROCHLORIDE 4 MILLIGRAM(S): 2 INJECTION INTRAMUSCULAR; INTRAVENOUS; SUBCUTANEOUS at 19:43

## 2019-06-06 RX ADMIN — Medication 100 MILLIGRAM(S): at 11:53

## 2019-06-06 RX ADMIN — Medication 101.2 MILLIGRAM(S): at 22:27

## 2019-06-06 RX ADMIN — Medication 2 MILLIGRAM(S): at 22:24

## 2019-06-06 RX ADMIN — Medication 100 MILLIGRAM(S): at 22:18

## 2019-06-06 RX ADMIN — HYDROMORPHONE HYDROCHLORIDE 4 MILLIGRAM(S): 2 INJECTION INTRAMUSCULAR; INTRAVENOUS; SUBCUTANEOUS at 06:40

## 2019-06-06 RX ADMIN — Medication 1000 MILLIGRAM(S): at 06:10

## 2019-06-06 RX ADMIN — Medication 101.2 MILLIGRAM(S): at 14:54

## 2019-06-06 RX ADMIN — HYDROMORPHONE HYDROCHLORIDE 4 MILLIGRAM(S): 2 INJECTION INTRAMUSCULAR; INTRAVENOUS; SUBCUTANEOUS at 12:30

## 2019-06-06 RX ADMIN — Medication 81 MILLIGRAM(S): at 11:52

## 2019-06-06 RX ADMIN — Medication 50 MILLIGRAM(S): at 06:10

## 2019-06-06 RX ADMIN — Medication 1000 MILLIGRAM(S): at 11:52

## 2019-06-06 RX ADMIN — Medication 1000 MILLIGRAM(S): at 22:18

## 2019-06-06 RX ADMIN — SENNA PLUS 2 TABLET(S): 8.6 TABLET ORAL at 22:18

## 2019-06-06 RX ADMIN — HYDROMORPHONE HYDROCHLORIDE 4 MILLIGRAM(S): 2 INJECTION INTRAMUSCULAR; INTRAVENOUS; SUBCUTANEOUS at 11:53

## 2019-06-06 RX ADMIN — SIMVASTATIN 40 MILLIGRAM(S): 20 TABLET, FILM COATED ORAL at 22:18

## 2019-06-06 RX ADMIN — Medication 1000 MILLIGRAM(S): at 22:27

## 2019-06-06 RX ADMIN — LOSARTAN POTASSIUM 100 MILLIGRAM(S): 100 TABLET, FILM COATED ORAL at 06:10

## 2019-06-06 RX ADMIN — Medication 100 MILLIGRAM(S): at 06:10

## 2019-06-06 NOTE — CHART NOTE - NSCHARTNOTEFT_GEN_A_CORE
Order received to fit and deliver LSO following surgery  Pt measured and fit with appropriate size  Pt able to stand with walker.  Instructions provided for eduarda  Written instructions left at bedside      Follow up if needed      Franklyn pittman [place]  [employer]

## 2019-06-07 ENCOUNTER — TRANSCRIPTION ENCOUNTER (OUTPATIENT)
Age: 84
End: 2019-06-07

## 2019-06-07 VITALS
SYSTOLIC BLOOD PRESSURE: 167 MMHG | HEART RATE: 62 BPM | OXYGEN SATURATION: 95 % | RESPIRATION RATE: 18 BRPM | TEMPERATURE: 98 F | DIASTOLIC BLOOD PRESSURE: 65 MMHG

## 2019-06-07 PROCEDURE — 36415 COLL VENOUS BLD VENIPUNCTURE: CPT

## 2019-06-07 PROCEDURE — 88311 DECALCIFY TISSUE: CPT

## 2019-06-07 PROCEDURE — 76000 FLUOROSCOPY <1 HR PHYS/QHP: CPT

## 2019-06-07 PROCEDURE — 97535 SELF CARE MNGMENT TRAINING: CPT

## 2019-06-07 PROCEDURE — 97530 THERAPEUTIC ACTIVITIES: CPT

## 2019-06-07 PROCEDURE — 97116 GAIT TRAINING THERAPY: CPT

## 2019-06-07 PROCEDURE — 97165 OT EVAL LOW COMPLEX 30 MIN: CPT

## 2019-06-07 PROCEDURE — 85027 COMPLETE CBC AUTOMATED: CPT

## 2019-06-07 PROCEDURE — 84295 ASSAY OF SERUM SODIUM: CPT

## 2019-06-07 PROCEDURE — C1889: CPT

## 2019-06-07 PROCEDURE — 97110 THERAPEUTIC EXERCISES: CPT

## 2019-06-07 PROCEDURE — 97161 PT EVAL LOW COMPLEX 20 MIN: CPT

## 2019-06-07 PROCEDURE — 80048 BASIC METABOLIC PNL TOTAL CA: CPT

## 2019-06-07 PROCEDURE — 88304 TISSUE EXAM BY PATHOLOGIST: CPT

## 2019-06-07 PROCEDURE — 99233 SBSQ HOSP IP/OBS HIGH 50: CPT

## 2019-06-07 RX ORDER — DIAZEPAM 5 MG
1 TABLET ORAL
Qty: 10 | Refills: 0
Start: 2019-06-07

## 2019-06-07 RX ORDER — HYDROMORPHONE HYDROCHLORIDE 2 MG/ML
1 INJECTION INTRAMUSCULAR; INTRAVENOUS; SUBCUTANEOUS
Qty: 35 | Refills: 0
Start: 2019-06-07 | End: 2019-06-13

## 2019-06-07 RX ADMIN — HYDROMORPHONE HYDROCHLORIDE 2 MILLIGRAM(S): 2 INJECTION INTRAMUSCULAR; INTRAVENOUS; SUBCUTANEOUS at 12:25

## 2019-06-07 RX ADMIN — Medication 100 MILLIGRAM(S): at 05:38

## 2019-06-07 RX ADMIN — Medication 81 MILLIGRAM(S): at 12:24

## 2019-06-07 RX ADMIN — Medication 1000 MILLIGRAM(S): at 05:38

## 2019-06-07 RX ADMIN — Medication 50 MILLIGRAM(S): at 05:38

## 2019-06-07 RX ADMIN — HYDROMORPHONE HYDROCHLORIDE 2 MILLIGRAM(S): 2 INJECTION INTRAMUSCULAR; INTRAVENOUS; SUBCUTANEOUS at 13:00

## 2019-06-07 RX ADMIN — Medication 1000 MILLIGRAM(S): at 12:24

## 2019-06-07 RX ADMIN — Medication 100 MILLIGRAM(S): at 12:24

## 2019-06-07 RX ADMIN — LOSARTAN POTASSIUM 100 MILLIGRAM(S): 100 TABLET, FILM COATED ORAL at 05:38

## 2019-06-07 RX ADMIN — HYDROMORPHONE HYDROCHLORIDE 4 MILLIGRAM(S): 2 INJECTION INTRAMUSCULAR; INTRAVENOUS; SUBCUTANEOUS at 04:32

## 2019-06-07 RX ADMIN — Medication 101.2 MILLIGRAM(S): at 05:38

## 2019-06-07 RX ADMIN — HYDROMORPHONE HYDROCHLORIDE 4 MILLIGRAM(S): 2 INJECTION INTRAMUSCULAR; INTRAVENOUS; SUBCUTANEOUS at 05:02

## 2019-06-07 RX ADMIN — Medication 1000 MILLIGRAM(S): at 12:26

## 2019-06-07 NOTE — PROGRESS NOTE ADULT - PROBLEM SELECTOR PLAN 1
s/p lumbar laminectomy L3-4 left L2-3 decompression on 6/4/19  pt/ot  pain control  encourage ambulation, incentive spirometer  d/c planning per SW
s/p lumbar laminectomy L3-4 left L2-3 decompression on 6/4/19  pt/ot  pain control  encourage ambulation, incentive spirometer  d/c planning home.

## 2019-06-07 NOTE — DISCHARGE NOTE NURSING/CASE MANAGEMENT/SOCIAL WORK - NSDCDPATPORTLINK_GEN_ALL_CORE
You can access the [facility] Patient Portal, offered by [facility], by registering with the following website: [email or web address]

## 2019-06-07 NOTE — DISCHARGE NOTE PROVIDER - HOSPITAL COURSE
This patient was admitted to [facility] with a history of severe lumbar stenosis.  Patient went to Pre-Surgical Testing at [facility] and was medically cleared to undergo elective procedure. L2-4 Laminectomy performed by Dr. Orr. No operative or jennifer-operative complications arose during patients hospital course.  Patient received antibiotic according to SCIP guidelines for infection prevention.  SCDs was given for DVT prophylaxis.  Anesthesia, Medical Hospitalist, Physical Therapy and Occupational Therapy were consulted. Patient is stable for discharge with a good prognosis.  Appropriate discharge instructions and medications are provided in this document.

## 2019-06-07 NOTE — DISCHARGE NOTE PROVIDER - NSDCCPTREATMENT_GEN_ALL_CORE_FT
PRINCIPAL PROCEDURE  Procedure: Lumbar laminectomy with discectomy at multiple levels  Findings and Treatment:

## 2019-06-07 NOTE — DISCHARGE NOTE PROVIDER - NSDCCPCAREPLAN_GEN_ALL_CORE_FT
PRINCIPAL DISCHARGE DIAGNOSIS  Diagnosis: Lumbar spinal stenosis  Assessment and Plan of Treatment: No heavy lifting. Do not lift more than 10 pounds.  Avoid twisting and bending over. Do NOT drive a car.  You may be driven in a car.  You may shower if the dressing is the clear plastic type or if you cover the existing dressing with plastic and tape to prevent it from getting wet.  Change dressing with dry, sterile gauze and tape if it becomes loose, wet or soiled.    Observe low back precautions as described by the Physical Therapist.  Walking is your best exercise.  It is best not to remain in one position for long periods such as long car rides. Call the doctor with questions, fevers, severe headache, For Constipation :   • Increase your water intake. Drink at least 8 glasses of water daily.  • Try adding fiber to your diet by eating fruits, vegetables and foods that are rich in grains.  • If you do experience constipation, you may take an over-the-counter stool softener/laxative such as Georgette Colace, [place] or  Milk of Magnesia. or bladder dysfunction, new numbness/weakness or new pain not relieved by medication.

## 2019-06-07 NOTE — PROGRESS NOTE ADULT - SUBJECTIVE AND OBJECTIVE BOX
Orthopedic Spine Progress Note      POST OPERATIVE DAY #: 3  STATUS POST:         L2-4 Laminectomy     Pre-Op Dx: Lumbar stenosis  Disc displacement, lumbar    Post-Op Dx:  Lumbar stenosis  Disc displacement, lumbar    Prin. Procedure:      SUBJECTIVE: Patient seen and examined. pain much improved    Pain (0-10):   Current Pain Management:  [ ] PCA   [x] Po Analgesics [ ] IM /IV Anagesics     Vital Signs Last 24 Hrs  T(C): 37.1 (07 Jun 2019 09:00), Max: 37.1 (07 Jun 2019 09:00)  T(F): 98.8 (07 Jun 2019 09:00), Max: 98.8 (07 Jun 2019 09:00)  HR: 72 (07 Jun 2019 09:00) (60 - 87)  BP: 159/94 (07 Jun 2019 09:00) (96/56 - 174/72)  BP(mean): 116 (07 Jun 2019 09:00) (116 - 116)  RR: 17 (07 Jun 2019 09:00) (14 - 17)  SpO2: 96% (07 Jun 2019 09:00) (96% - 100%)  I&O's Detail      OBJECTIVE:         Wound /Dressing: removed, wound + steri strips, I/C/D    Neurological: A/O x  3            Sensation: [x] intact to light touch  [ ] decreased:          Motor exam: [  ]                [ ] Lower ext.     Hip Flx  Hip Ext   Hip Abd  Hip Add Quad   Hamstrg   TA       EHL      GS                              R        5/5        5/5        5/5             5/5        5/5        5/5        5/5     5/5      5/5                              L         5/5        5/5        5/5             5/5        5/5        5/5        5/5     5/5      5/5                                                            RADIOLOGY & ADDITIONAL STUDIES:                                     A/P :  83y Female   s/p:  L2-4 Laminectomy  -    Pain control, d/s on medrol dose pack  -    DVT ppx: SCDs    -    Review labs as indicated     -    Physical Therapy: WBAT with spine precautions  -    Weight bearing status:   -    Dispo: Home [ x     Rehab [ ]
PA Progress note    Post Op Day # _1____    83y Female  s/p     Laminectomy   Posterior fusion      ACDF     SUBJECTIVE    Patient seen and examined at bedside.   Pain  well controlled on current pain regimen, still having pain and burning sensation in left thigh but better than prior to OR  Denies CP, SOB, N/V/D  No new complaints     OBJECTIVE     Vital Signs Last 24 Hrs  T(C): 36.7 (05 Jun 2019 05:15), Max: 36.7 (04 Jun 2019 23:25)  T(F): 98 (05 Jun 2019 05:15), Max: 98 (04 Jun 2019 23:25)  HR: 70 (05 Jun 2019 05:30) (56 - 80)  BP: 151/76 (05 Jun 2019 05:30) (108/57 - 162/64)  BP(mean): --  RR: 16 (05 Jun 2019 05:15) (10 - 20)  SpO2: 96% (05 Jun 2019 05:15) (92% - 100%)  I&O's Summary    04 Jun 2019 07:01  -  05 Jun 2019 07:00  --------------------------------------------------------  IN: 1250 mL / OUT: 1170 mL / NET: 80 mL        PHYSICAL EXAM    *Primary surgical dressing / Surgical incision* C/D/I  drain in place  Distal pulses (+2) bilaterally  Calves supple/nontender bilaterally  Sensation grossly intact to light touch bilaterally  EHL/FHL intact bilaterally    LABS             04 Jun 2019 10:54    143    |  x      |  x      ----------------------------<  x      x       |  x      |  x              ASSESSMENT AND PLAN:     -  Continue current pain management   -  DVT prophylaxis: SCDs       -  PT/OT: Weight bearing as tolerated, spine precautions   -  Incentive spirometry  - Follow up labs  - Disposition: Home  pending PT evaluation
Patient is a 83y old  Female who presents with a chief complaint of       HPI:  84 yo female with pmh of htn, hld, anxiety, s/p lumbar laminectomy L3-4 left L2-3 decompression on 6/4/19   complaining of mild pain and discomfort     SUBJECTIVE & OBJECTIVE: Pt seen and examined at bedside, no aucte complaints    PHYSICAL EXAM:  T(C): 36.6 (06-06-19 @ 07:12), Max: 36.8 (06-05-19 @ 12:20)  HR: 64 (06-06-19 @ 09:58) (51 - 72)  BP: 160/80 (06-06-19 @ 07:12) (120/65 - 169/71)  RR: 16 (06-06-19 @ 07:12) (15 - 17)  SpO2: 97% (06-06-19 @ 09:58) (95% - 99%)  Wt(kg): --   GENERAL: NAD, well-groomed, well-developed  HEAD:  Atraumatic, Normocephalic  EYES: EOMI, PERRLA, conjunctiva and sclera clear  ENMT: Moist mucous membranes  NECK: Supple, No JVD  NERVOUS SYSTEM:  Alert & Oriented X3,   CHEST/LUNG: Clear to auscultation bilaterally; No rales, rhonchi, wheezing, or rubs  HEART: Regular rate and rhythm; No murmurs, rubs, or gallops  ABDOMEN: Soft, Nontender, Nondistended; Bowel sounds present  EXTREMITIES:  2+ Peripheral Pulses, No clubbing, cyanosis, or edema        MEDICATIONS  (STANDING):  acetaminophen   Tablet .. 1000 milliGRAM(s) Oral every 8 hours  aspirin enteric coated 81 milliGRAM(s) Oral daily  docusate sodium 100 milliGRAM(s) Oral three times a day  lactated ringers. 1000 milliLiter(s) (75 mL/Hr) IV Continuous <Continuous>  losartan 100 milliGRAM(s) Oral daily  metoprolol succinate ER 50 milliGRAM(s) Oral daily  senna 2 Tablet(s) Oral at bedtime  simvastatin 40 milliGRAM(s) Oral at bedtime    MEDICATIONS  (PRN):  diazepam    Tablet 2 milliGRAM(s) Oral every 8 hours PRN muscle spasm, anxiety  HYDROmorphone   Tablet 2 milliGRAM(s) Oral every 3 hours PRN Mild Pain (1 - 3)  HYDROmorphone   Tablet 4 milliGRAM(s) Oral every 3 hours PRN Moderate Pain (4 - 6)  HYDROmorphone  Injectable 0.5 milliGRAM(s) IV Push every 3 hours PRN Severe Pain (7 - 10)  magnesium hydroxide Suspension 30 milliLiter(s) Oral every 12 hours PRN Constipation      LABS:                        12.8   5.69  )-----------( 181      ( 05 Jun 2019 07:50 )             38.3     06-05    139  |  103  |  23  ----------------------------<  124<H>  4.3   |  31  |  0.81    Ca    9.1      05 Jun 2019 07:50            CAPILLARY BLOOD GLUCOSE          CAPILLARY BLOOD GLUCOSE        CAPILLARY BLOOD GLUCOSE                RECENT CULTURES:      RADIOLOGY & ADDITIONAL TESTS:                        DVT/GI ppx  Discussed with pt @ bedside
Progress Note    Post Op Day # 2    83y Female  s/p Laminectomy L3-4, L2-3    SUBJECTIVE    Patient seen and examined at bedside.   Pain  well controlled on current pain regimen  Denies CP, SOB, N/V/D, weakness, numbness   Complains of left hip pain radiating to left thigh     OBJECTIVE     Vital Signs Last 24 Hrs  T(C): 36.4 (06 Jun 2019 11:07), Max: 36.8 (05 Jun 2019 19:00)  T(F): 97.6 (06 Jun 2019 11:07), Max: 98.2 (05 Jun 2019 19:00)  HR: 62 (06 Jun 2019 11:07) (51 - 72)  BP: 145/76 (06 Jun 2019 11:07) (136/64 - 169/71)  BP(mean): --  RR: 16 (06 Jun 2019 11:07) (15 - 16)  SpO2: 96% (06 Jun 2019 11:07) (95% - 99%)  I&O's Summary    05 Jun 2019 07:01  -  06 Jun 2019 07:00  --------------------------------------------------------  IN: 0 mL / OUT: 200 mL / NET: -200 mL        PHYSICAL EXAM    Surgical incision C/D/I  Distal pulses (+2) bilaterally  Calves supple/nontender bilaterally  Sensation grossly intact to light touch bilaterally  EHL/FHL intact bilaterally    LABS                                 12.8   5.69  )-----------( 181      ( 05 Jun 2019 07:50 )             38.3   05 Jun 2019 07:50    05 Jun 2019 07:50    139    |  103    |  23     ----------------------------<  124<H>  4.3     |  31     |  0.81     Ca    9.1        05 Jun 2019 07:50        ASSESSMENT AND PLAN:     -  Pain management as clinically indicated  -  LSO brace ordered from [employer]. Rx on chart  -  DVT prophylaxis: SCDs       -  PT/OT: Weight bearing as tolerated, spine precautions   -  Incentive spirometry  - Follow up labs  - Disposition: Home
Patient is a 83y old  Female who presents with a chief complaint of     INTERVAL HPI/OVERNIGHT EVENTS:  Pt is seen and examined.  Pain is controlled.    Pain Location & Control:     MEDICATIONS  (STANDING):  acetaminophen   Tablet .. 1000 milliGRAM(s) Oral every 8 hours  aspirin enteric coated 81 milliGRAM(s) Oral daily  docusate sodium 100 milliGRAM(s) Oral three times a day  lactated ringers. 1000 milliLiter(s) (75 mL/Hr) IV Continuous <Continuous>  losartan 100 milliGRAM(s) Oral daily  metoprolol succinate ER 50 milliGRAM(s) Oral daily  senna 2 Tablet(s) Oral at bedtime  simvastatin 40 milliGRAM(s) Oral at bedtime    MEDICATIONS  (PRN):  diazepam    Tablet 2 milliGRAM(s) Oral every 8 hours PRN muscle spasm, anxiety  HYDROmorphone   Tablet 2 milliGRAM(s) Oral every 3 hours PRN Mild Pain (1 - 3)  HYDROmorphone   Tablet 4 milliGRAM(s) Oral every 3 hours PRN Moderate Pain (4 - 6)  HYDROmorphone  Injectable 0.5 milliGRAM(s) IV Push every 3 hours PRN Severe Pain (7 - 10)  magnesium hydroxide Suspension 30 milliLiter(s) Oral every 12 hours PRN Constipation      Allergies    No Known Allergies    Intolerances      Vital Signs Last 24 Hrs  T(C): 37.1 (07 Jun 2019 09:00), Max: 37.1 (07 Jun 2019 09:00)  T(F): 98.8 (07 Jun 2019 09:00), Max: 98.8 (07 Jun 2019 09:00)  HR: 72 (07 Jun 2019 09:00) (60 - 87)  BP: 159/94 (07 Jun 2019 09:00) (96/56 - 174/72)  BP(mean): 116 (07 Jun 2019 09:00) (116 - 116)  RR: 17 (07 Jun 2019 09:00) (14 - 17)  SpO2: 96% (07 Jun 2019 09:00) (96% - 100%)      RADIOLOGY & ADDITIONAL TESTS:    Imaging Personally Reviewed:  [ ] YES  [ ] NO    Consultant(s) Notes Reviewed:  [ ] YES  [ ] NO    Care Discussed with Consultants/Other Providers [ x] YES  [ ] NO

## 2019-06-07 NOTE — PROGRESS NOTE ADULT - ASSESSMENT
82 yo female with pmh of htn, hld, anxiety, s/p lumbar laminectomy L3-4 left L2-3 decompression on 6/4/19   complaining of mild pain and discomfort
84 yo female with pmh of htn, hld, anxiety, s/p lumbar laminectomy L3-4 left L2-3 decompression on 6/4/19   complaining of mild pain and discomfort

## 2019-06-07 NOTE — DISCHARGE NOTE PROVIDER - INSTRUCTIONS
For Constipation :   • Increase your water intake. Drink at least 8 glasses of water daily.  • Try adding fiber to your diet by eating fruits, vegetables and foods that are rich in grains.  • If you do experience constipation, you may take an over-the-counter stool softener/laxative such as Georgtete Colace, Senekot or  Milk of Magnesia.

## 2019-06-17 ENCOUNTER — APPOINTMENT (OUTPATIENT)
Dept: ORTHOPEDIC SURGERY | Facility: CLINIC | Age: 84
End: 2019-06-17
Payer: MEDICARE

## 2019-06-17 VITALS
HEIGHT: 62 IN | DIASTOLIC BLOOD PRESSURE: 74 MMHG | SYSTOLIC BLOOD PRESSURE: 146 MMHG | WEIGHT: 164 LBS | BODY MASS INDEX: 30.18 KG/M2 | HEART RATE: 74 BPM

## 2019-06-17 PROBLEM — I63.9 CEREBRAL INFARCTION, UNSPECIFIED: Chronic | Status: ACTIVE | Noted: 2018-03-30

## 2019-06-17 PROCEDURE — 99024 POSTOP FOLLOW-UP VISIT: CPT

## 2019-06-19 NOTE — HISTORY OF PRESENT ILLNESS
[___ Weeks Post Op] : [unfilled] weeks post op [Clean/Dry/Intact] : clean, dry and intact [Vascular Intact] : ~T peripheral vascular exam normal [Negative Luis's] : maneuvers demonstrated a negative Luis's sign [Doing Well] : is doing well [Adequate Pain Control] : has adequate pain control [No Sign of Infection] : is showing no signs of infection [Steri-Strips Removed & Replaced] : steri-strips removed and replaced [Erythema] : not erythematous [Discharge] : absent of discharge [Swelling] : not swollen [Dehiscence] : not dehisced [de-identified] : Lumbar laminectomy microdiskectomy 6/4/19 [de-identified] : Patient is here today for her first post operative office visit. Overall back pain is less mainly soreness along suture line. Patient states left thigh is on fire since surgery with improvement. [de-identified] : Seen with her . Non-ataxic minimally antalgic gait on the left side. No assistive devices.\par Grossly intact light touch bilateral lower extremities per was monitored and ankle jerk reflexes bilaterally. 5 out of 5 motor strength in knee extension hip flexion ankle dorsiflexion EHL bilaterally [de-identified] : The patient has symptoms suggestive of lumbar radiculopathy at this time. She reports that this is much better she had prior to surgery and is slowly improving. She denies any significant back pain. Recommend course of gabapentin for her. I will see her back in 4 weeks for routine followup.\par \par Overall she is much approved from prior to surgery and is reporting some residual lumbar radicular pain. I indicated that this may take some time to resolve.\par \par The patient was educated regarding their condition, treatment options as well as prescribed course of treatment. \par Risks and benefits as well as alternatives to the proposed treatment were also provided to the patient \par They were given the opportunity to have all their questions answered to their satisfaction.\par \par Vital signs were reviewed with the patient and the patient was instructed to followup with their primary care provider for further management.\par \par Healthy lifestyle recommendations were also made including a tobacco free lifestyle, proper diet, and weight control.

## 2019-07-17 ENCOUNTER — APPOINTMENT (OUTPATIENT)
Dept: ORTHOPEDIC SURGERY | Facility: CLINIC | Age: 84
End: 2019-07-17
Payer: MEDICARE

## 2019-07-17 VITALS
HEIGHT: 62 IN | SYSTOLIC BLOOD PRESSURE: 176 MMHG | HEART RATE: 66 BPM | WEIGHT: 164 LBS | DIASTOLIC BLOOD PRESSURE: 74 MMHG | BODY MASS INDEX: 30.18 KG/M2

## 2019-07-17 DIAGNOSIS — M70.62 TROCHANTERIC BURSITIS, LEFT HIP: ICD-10-CM

## 2019-07-17 PROCEDURE — 20610 DRAIN/INJ JOINT/BURSA W/O US: CPT | Mod: LT,79

## 2019-07-17 PROCEDURE — 99024 POSTOP FOLLOW-UP VISIT: CPT

## 2019-07-17 RX ORDER — GABAPENTIN 300 MG/1
300 CAPSULE ORAL
Qty: 90 | Refills: 0 | Status: DISCONTINUED | COMMUNITY
Start: 2017-08-18 | End: 2019-07-17

## 2019-07-17 RX ORDER — GABAPENTIN 300 MG/1
300 CAPSULE ORAL
Qty: 30 | Refills: 0 | Status: DISCONTINUED | COMMUNITY
Start: 2019-02-06 | End: 2019-07-17

## 2019-07-17 NOTE — HISTORY OF PRESENT ILLNESS
[___ Months Post Op] : [unfilled] months post op [8] : the patient reports pain that is 8/10 in severity [Healed] : healed [Vascular Intact] : ~T peripheral vascular exam normal [Negative Luis's] : maneuvers demonstrated a negative Luis's sign [Slow Progress] : is progressing slowly [No Sign of Infection] : is showing no signs of infection [Adequate Pain Control] : has adequate pain control [de-identified] : Lumbar laminectomy/microdiskectomy 6/14/19 [de-identified] : Patient states no changes still getting low back left leg thigh pain spasms. Patient finished home physical therapy at this time and finished her gabapentin still issues with walking heaviness in her legs. [de-identified] : Seen with her . Non-ataxic minimally antalgic gait on the left side. No assistive devices.\par Grossly intact light touch bilateral lower extremities per was monitored and ankle jerk reflexes bilaterally. 5 out of 5 motor strength in knee extension hip flexion ankle dorsiflexion EHL bilaterally. The surgical incision site(s) was clean, dry and intact, not erythematous, absent of discharge, not swollen and not dehisced. Additional findings included peripheral vascular exam normal and maneuvers demonstrated a negative Luis's sign. \par + left GT tenderness [de-identified] : The patient reports increasing pain along the left lateral thigh and anterior thigh. She has trochanteric tenderness on further injection of corticosteroid for this but she wanted to proceed. Under sterile conditions 40 mg of Depo-Medrol mixed with the 5 cc solution of 1% lidocaine without epinephrine was injected into the left trochanteric bursa without incident. The patient reported no significant change of the burning that she is reporting on the lateral thigh as well as anterior thigh and left buttock.\par \par At this time recommended MRI lumbar spine with and without contrast to better evaluate her pathology. She may have recurrent herniation for adjacent level pathology we can treat her symptoms. She understands the given her age and multiple levels of pathology Limited interventions may not completely address all her problems. She may need additional injections as appropriate.

## 2019-07-24 ENCOUNTER — APPOINTMENT (OUTPATIENT)
Dept: ORTHOPEDIC SURGERY | Facility: CLINIC | Age: 84
End: 2019-07-24
Payer: MEDICARE

## 2019-07-24 VITALS — SYSTOLIC BLOOD PRESSURE: 173 MMHG | DIASTOLIC BLOOD PRESSURE: 80 MMHG | HEART RATE: 73 BPM

## 2019-07-24 DIAGNOSIS — M43.10 SPONDYLOLISTHESIS, SITE UNSPECIFIED: ICD-10-CM

## 2019-07-24 PROCEDURE — 99024 POSTOP FOLLOW-UP VISIT: CPT

## 2019-07-24 NOTE — HISTORY OF PRESENT ILLNESS
[___ Months Post Op] : [unfilled] months post op [de-identified] : Patient present here today to review her MRI results.  [8] : the patient reports pain that is 8/10 in severity [Negative Luis's] : maneuvers demonstrated a negative Luis's sign [Vascular Intact] : ~T peripheral vascular exam normal [Healed] : healed [No Sign of Infection] : is showing no signs of infection [Slow Progress] : is progressing slowly [Adequate Pain Control] : has adequate pain control [de-identified] : Lumbar laminectomy/microdiskectomy 6/14/19 [de-identified] : Seen with her . Non-ataxic minimally antalgic gait on the left side. No assistive devices.\par Grossly intact light touch bilateral lower extremities per was monitored and ankle jerk reflexes bilaterally. 5 out of 5 motor strength in knee extension hip flexion ankle dorsiflexion EHL bilaterally. The surgical incision site(s) was clean, dry and intact, not erythematous, absent of discharge, not swollen and not dehisced. Additional findings included peripheral vascular exam normal and maneuvers demonstrated a negative Luis's sign. \par + left GT tenderness

## 2019-07-24 NOTE — DISCUSSION/SUMMARY
[Medication Risks Reviewed] : Medication risks reviewed [de-identified] : The patient has progressive degenerative scoliosis of the right with asymmetric disc degeneration on the left side L2-L3 4 levels. I spoke with the patient and her  as well as her son over the telephone and inform them that given the progressive degeneration as well as asymmetric degeneration left side there is likely a component of bony compression of the exiting left L2 and L3 nerve roots. A limited decompression performed did not completely remove all the bony compression of the nerves and to remove the bony compression would require a much larger surgical intervention with facetectomy as well as spinal instrumentation with screws and rods. This may also involve some deformity correction at that level of L3-4 and also at L2-3. The patient and her family are not interested in additional surgery but may consider lumbar epidural steroid injections. A referral to a pain management specialist was provided since she has difficulty traveling to [place] for her injections.\par \par The patient was educated regarding their condition, treatment options as well as prescribed course of treatment. \par Risks and benefits as well as alternatives to the proposed treatment were also provided to the patient \par They were given the opportunity to have all their questions answered to their satisfaction.\par \par Vital signs were reviewed with the patient and the patient was instructed to followup with their primary care provider for further management.\par \par Healthy lifestyle recommendations were also made including a tobacco free lifestyle, proper diet, and weight control.

## 2019-07-24 NOTE — PHYSICAL EXAM
[de-identified] : MRI lumbar spine with and without contrast performed at [facility] on July 18, 2019 demonstrates left L3-4 laminotomy with improved spinal stenosis. Markedly a foraminal stenosis and marked left foraminal stenosis also seen at L2-3 on the left. Mild subluxation L1-L2 3 L3-4 unchanged.

## 2019-08-14 ENCOUNTER — RX RENEWAL (OUTPATIENT)
Age: 84
End: 2019-08-14

## 2019-10-22 ENCOUNTER — APPOINTMENT (OUTPATIENT)
Dept: SPINE | Facility: CLINIC | Age: 84
End: 2019-10-22
Payer: MEDICARE

## 2019-10-22 VITALS
SYSTOLIC BLOOD PRESSURE: 178 MMHG | HEART RATE: 64 BPM | HEIGHT: 65 IN | BODY MASS INDEX: 27.82 KG/M2 | WEIGHT: 167 LBS | DIASTOLIC BLOOD PRESSURE: 91 MMHG

## 2019-10-22 PROCEDURE — 99203 OFFICE O/P NEW LOW 30 MIN: CPT

## 2019-10-22 RX ORDER — MELOXICAM 7.5 MG/1
7.5 TABLET ORAL DAILY
Qty: 30 | Refills: 0 | Status: DISCONTINUED | COMMUNITY
Start: 2019-02-06 | End: 2019-10-22

## 2019-10-22 NOTE — ASSESSMENT
[FreeTextEntry1] : Assess;\par Post op from OSH of lumbar lami\par Recurrent  lower back pain \par Multiple levels of stenosis in lower spine and compression of nerves\par \par \par PLAN:\par Scoliosis imaging\par Return after images complete

## 2019-10-22 NOTE — HISTORY OF PRESENT ILLNESS
[de-identified] : 84 year old white female who presents with a history of lower back pain and left hip pain that radiates into the anterior thigh.  The pain is constacnt and an ache and burning in nature.  No bowel or bladder symptoms.  She had undergone a lumbar lami in June 2019 which was not helpful.

## 2019-10-22 NOTE — REASON FOR VISIT
[New Patient Visit] : a new patient visit [Referred By: _________] : Patient was referred by NASIMA [Spouse] : spouse [FreeTextEntry1] : Lumbar radiculopathy

## 2019-10-22 NOTE — REVIEW OF SYSTEMS
[Numbness] : numbness [Tingling] : tingling [Difficulty Walking] : difficulty walking [Negative] : Heme/Lymph [de-identified] : lower back pain and left leg pain

## 2019-10-24 ENCOUNTER — OUTPATIENT (OUTPATIENT)
Dept: OUTPATIENT SERVICES | Facility: HOSPITAL | Age: 84
LOS: 1 days | End: 2019-10-24
Payer: MEDICARE

## 2019-10-24 ENCOUNTER — APPOINTMENT (OUTPATIENT)
Dept: RADIOLOGY | Facility: HOSPITAL | Age: 84
End: 2019-10-24

## 2019-10-24 DIAGNOSIS — Z98.49 CATARACT EXTRACTION STATUS, UNSPECIFIED EYE: Chronic | ICD-10-CM

## 2019-10-24 DIAGNOSIS — Z98.890 OTHER SPECIFIED POSTPROCEDURAL STATES: Chronic | ICD-10-CM

## 2019-10-24 DIAGNOSIS — M48.062 SPINAL STENOSIS, LUMBAR REGION WITH NEUROGENIC CLAUDICATION: ICD-10-CM

## 2019-10-24 PROCEDURE — 72083 X-RAY EXAM ENTIRE SPI 4/5 VW: CPT | Mod: 26

## 2019-10-29 ENCOUNTER — APPOINTMENT (OUTPATIENT)
Dept: SPINE | Facility: CLINIC | Age: 84
End: 2019-10-29
Payer: MEDICARE

## 2019-10-29 VITALS
WEIGHT: 167 LBS | BODY MASS INDEX: 27.82 KG/M2 | HEART RATE: 69 BPM | SYSTOLIC BLOOD PRESSURE: 177 MMHG | HEIGHT: 65 IN | DIASTOLIC BLOOD PRESSURE: 76 MMHG

## 2019-10-29 DIAGNOSIS — Z78.9 OTHER SPECIFIED HEALTH STATUS: ICD-10-CM

## 2019-10-29 PROCEDURE — 99214 OFFICE O/P EST MOD 30 MIN: CPT

## 2019-10-29 NOTE — REVIEW OF SYSTEMS
[Numbness] : numbness [Tingling] : tingling [Difficulty Walking] : difficulty walking [Negative] : Heme/Lymph [de-identified] : lower back pain and left leg pain

## 2019-10-29 NOTE — REASON FOR VISIT
[Follow-Up: _____] : a [unfilled] follow-up visit [Family Member] : family member [FreeTextEntry1] : 84 year old female with a history of lower back pain and left leg pain.  She has difficulty walking.  In th epast in an OSH she has undergone a lumbar lami.  Today she returns with additional imaging to determine if surgical intervention is warranted.  She denies an urine or bowel symptoms.

## 2019-10-29 NOTE — ASSESSMENT
[FreeTextEntry1] : Assess:\par Lumbar radiculopathy\par MRI show multiple level stenosis\par Scoliosis image shows no sagital imbalance\par Dr Mackay spoke to her son on th ephone (Shahid) and the procedure, risks, complications, and hospital stay  were explained\par \par \par PLAN:\par Discussed surgical options and intervention  \par L1 to L5 interbody fusion from lateral approach was explained\par She will  schedule this with the office  and discussing with family\par

## 2019-10-30 NOTE — PHYSICAL EXAM
AVS and discharge instructions reviewed with patient. Verbalized understanding. Denies additional questions/concerns.      [Antalgic] : antalgic [Stooped] : stooped [LE] : Sensory: Intact in bilateral lower extremities [Knee] : patellar 2+ and symmetric bilaterally [Ankle] : ankle 2+ and symmetric bilaterally [DP] : dorsalis pedis 2+ and symmetric bilaterally [PT] : posterior tibial 2+ and symmetric bilaterally [SLR] : negative straight leg raise [de-identified] : The pt is awake, alert and oriented to self, place and time, is uncomfortable and in no acute distress. Inspection of neck, back and lower extremities bilaterally reveals no rashes or ecchymotic lesions.  There is no tenderness over the cervical, thoracic spine, or the upper and lower extremities musculature.Paraspinal lumbar tenderness with midline lumbar tenderness noted.  There is no sacroiliac tenderness. left greater trochanteric tenderness on the left. No atrophy or abnormal movements noted in the upper or lower extremities. There is no swelling noted in the upper or lower extremities bilaterally. No cervical lymphadenopathy noted anteriorly. No joint laxity noted in the upper and lower extremity joints bilaterally.\par Hip range of motion is degrees internal rotation 30° external rotation without pain. Full range of motion of the shoulders bilaterally with no significant pain\par There is no groin pain with hip internal rotation and a negative CRISTA test bilaterally.  [de-identified] : seen with her

## 2019-12-04 ENCOUNTER — RECORD ABSTRACTING (OUTPATIENT)
Age: 84
End: 2019-12-04

## 2019-12-04 NOTE — RESULTS/DATA
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 No similar prior studies available for comparison. \par \par IMPRESSION: \par No compression fractures, spondylolistheses, or spondylolysis defects. \par \par Multilevel degenerative disease manifest by varying degrees of disc space \par narrowing with disc margin osteophyte formation and posterior facet \par arthrosis. \par \par Notable lumbar dextrocurvature. Positive coronal and sagittal balances. No \par pelvic tilt. \par \par Unremarkable SI joints and partially visualized hips. \par \par Generalized osteopenia otherwise no discrete lytic or blastic lesions. \par \par \par \par \par \par \par \par \par CHAO VILLEDA M.D., ATTENDING RADIOLOGIST \par This document has been electronically signed. Oct 24 2019 4:42PM \par \par \par \par \par \par  \par

## 2019-12-09 ENCOUNTER — APPOINTMENT (OUTPATIENT)
Dept: NEUROSURGERY | Facility: CLINIC | Age: 84
End: 2019-12-09
Payer: MEDICARE

## 2019-12-09 VITALS — WEIGHT: 173 LBS | HEIGHT: 61.81 IN | BODY MASS INDEX: 31.83 KG/M2

## 2019-12-09 VITALS — SYSTOLIC BLOOD PRESSURE: 162 MMHG | HEART RATE: 70 BPM | DIASTOLIC BLOOD PRESSURE: 77 MMHG

## 2019-12-09 DIAGNOSIS — M54.12 RADICULOPATHY, CERVICAL REGION: ICD-10-CM

## 2019-12-09 DIAGNOSIS — Z86.2 PERSONAL HISTORY OF DISEASES OF THE BLOOD AND BLOOD-FORMING ORGANS AND CERTAIN DISORDERS INVOLVING THE IMMUNE MECHANISM: ICD-10-CM

## 2019-12-09 DIAGNOSIS — R31.29 OTHER MICROSCOPIC HEMATURIA: ICD-10-CM

## 2019-12-09 DIAGNOSIS — M48.062 SPINAL STENOSIS, LUMBAR REGION WITH NEUROGENIC CLAUDICATION: ICD-10-CM

## 2019-12-09 DIAGNOSIS — Z78.9 OTHER SPECIFIED HEALTH STATUS: ICD-10-CM

## 2019-12-09 DIAGNOSIS — M81.0 AGE-RELATED OSTEOPOROSIS W/OUT CURRENT PATHOLOGICAL FRACTURE: ICD-10-CM

## 2019-12-09 DIAGNOSIS — Z86.79 PERSONAL HISTORY OF OTHER DISEASES OF THE CIRCULATORY SYSTEM: ICD-10-CM

## 2019-12-09 DIAGNOSIS — Z87.898 PERSONAL HISTORY OF OTHER SPECIFIED CONDITIONS: ICD-10-CM

## 2019-12-09 DIAGNOSIS — Z98.890 OTHER SPECIFIED POSTPROCEDURAL STATES: ICD-10-CM

## 2019-12-09 PROCEDURE — 99214 OFFICE O/P EST MOD 30 MIN: CPT

## 2019-12-20 NOTE — REVIEW OF SYSTEMS
[Abnormal Sensation] : an abnormal sensation [Difficulty Walking] : difficulty walking [As Noted in HPI] : as noted in HPI [Negative] : Heme/Lymph [FreeTextEntry9] : lower back pain radiating to b/l LE (L>R)

## 2019-12-20 NOTE — REASON FOR VISIT
[Consultation] : a consultation visit [Referred By: _________] : Patient was referred by NASIMA [Spouse] : spouse [Patient Declined  Services] : - None: Patient declined  services [FreeTextEntry1] :  for permanent SCS implantation [TWNoteComboBox1] : [place]

## 2019-12-20 NOTE — PHYSICAL EXAM
[General Appearance - Alert] : alert [General Appearance - Well Nourished] : well nourished [Oriented To Time, Place, And Person] : oriented to person, place, and time [Impaired Insight] : insight and judgment were intact [Affect] : the affect was normal [Memory Recent] : recent memory was not impaired [Person] : oriented to person [Place] : oriented to place [Time] : oriented to time [Fluency] : fluency intact [Registration Intact] : recent registration memory intact [Span Intact] : the attention span was normal [Comprehension] : comprehension intact [Vocabulary] : adequate range of vocabulary [Cranial Nerves Optic (II)] : visual acuity intact bilaterally,  pupils equal round and reactive to light [Cranial Nerves Oculomotor (III)] : extraocular motion intact [Cranial Nerves Trigeminal (V)] : facial sensation intact symmetrically [Cranial Nerves Facial (VII)] : face symmetrical [Cranial Nerves Vestibulocochlear (VIII)] : hearing was intact bilaterally [Cranial Nerves Glossopharyngeal (IX)] : tongue and palate midline [Cranial Nerves Hypoglossal (XII)] : there was no tongue deviation with protrusion [Cranial Nerves Accessory (XI - Cranial And Spinal)] : head turning and shoulder shrug symmetric [Motor Tone] : muscle tone was normal in all four extremities [4] : L3 quadriceps 4/5 [5] : S1 flexor hallucis longus 5/5 [Dysesthesia] : dysesthesia was present [Limited Balance] : the patient's balance was impaired [1+] : Patella left 1+ [Straight-Leg Raise Test - Left] : straight leg raise of the left leg was positive [No Visual Abnormalities] : no visible abnormailities [Normal] : normal [PERRL With Normal Accommodation] : pupils were equal in size, round, reactive to light, with normal accommodation [Extraocular Movements] : extraocular movements were intact [Outer Ear] : the ears and nose were normal in appearance [Hearing Threshold Finger Rub Not [place]] : hearing was normal [Examination Of The Oral Cavity] : the lips and gums were normal [] : no respiratory distress [Exaggerated Use Of Accessory Muscles For Inspiration] : no accessory muscle use [Edema] : there was no peripheral edema [No CVA Tenderness] : no ~M costovertebral angle tenderness [Abnormal Walk] : normal gait [Straight-Leg Raise Test - Right] : straight leg raise  of the right leg was negative [FreeTextEntry1] : mild LLE weakness 2/2 pain

## 2019-12-20 NOTE — HISTORY OF PRESENT ILLNESS
[de-identified] : Ms. RANDEE CONNELL is a 83 yo female with PMH of HTN, HLD, peptic ulcer, osteoporosis, lumbar herniated disc (s/p L2-L4 laminectomy and microdiscectomy on 6/5/19 with Dr. Garcia) presents for initial consultation for SCS permanent implant. She underwent SCS trial with Dr. Grady last week and reports 50% pain relief with significant improvement of performing ADLs and decrease dysesthesia. Previously she was seen by Dr. Mackay who recommend multilevel interbody fusion for scoliosis/spondylolisthesis but patient/family do not pursue invasive spinal surgery at this time.\par \par Baseline symptoms\par Severe (7-10/10) pain (burning and heaviness) on lower back radiating to b/l buttocks/hips/thigh (L>R).\par Pain is worsening by sitting/standing/walking and alleviated by resting/lying on her right side.\par Pain has been progressively worsening for one year and causing significant gait/ADLs performance disturbance.\par Denies weakness on LE, bowel/bladder dysfunction, saddle anesthesia.\par She ambulates without assistive device and performs ADLs independently.

## 2019-12-20 NOTE — ASSESSMENT
[FreeTextEntry1] : This is a 83 yo female with chronic back pain with radiculopathy who has moderate relief of pain with SCS trial. Given her good improvement of pain and ADLs performance during SCS trial, permanent implant can be beneficial. Risk, benefit and alternative to the surgery were explained to the patient,  and his son(MIKAELA over the phone). Patient verbalizes understanding and would like to proceed surgery.\par - schedule for SCS perm in Jan 2020

## 2020-01-15 ENCOUNTER — OUTPATIENT (OUTPATIENT)
Dept: OUTPATIENT SERVICES | Facility: HOSPITAL | Age: 85
LOS: 1 days | End: 2020-01-15
Payer: MEDICARE

## 2020-01-15 VITALS
OXYGEN SATURATION: 98 % | DIASTOLIC BLOOD PRESSURE: 90 MMHG | WEIGHT: 171.96 LBS | HEIGHT: 61 IN | HEART RATE: 69 BPM | RESPIRATION RATE: 16 BRPM | TEMPERATURE: 97 F | SYSTOLIC BLOOD PRESSURE: 180 MMHG

## 2020-01-15 DIAGNOSIS — Z98.890 OTHER SPECIFIED POSTPROCEDURAL STATES: Chronic | ICD-10-CM

## 2020-01-15 DIAGNOSIS — Z98.49 CATARACT EXTRACTION STATUS, UNSPECIFIED EYE: Chronic | ICD-10-CM

## 2020-01-15 DIAGNOSIS — Z01.818 ENCOUNTER FOR OTHER PREPROCEDURAL EXAMINATION: ICD-10-CM

## 2020-01-15 DIAGNOSIS — I10 ESSENTIAL (PRIMARY) HYPERTENSION: ICD-10-CM

## 2020-01-15 DIAGNOSIS — G89.4 CHRONIC PAIN SYNDROME: ICD-10-CM

## 2020-01-15 LAB
ANION GAP SERPL CALC-SCNC: 12 MMO/L — SIGNIFICANT CHANGE UP (ref 7–14)
BLD GP AB SCN SERPL QL: NEGATIVE — SIGNIFICANT CHANGE UP
BUN SERPL-MCNC: 26 MG/DL — HIGH (ref 7–23)
CALCIUM SERPL-MCNC: 9.9 MG/DL — SIGNIFICANT CHANGE UP (ref 8.4–10.5)
CHLORIDE SERPL-SCNC: 102 MMOL/L — SIGNIFICANT CHANGE UP (ref 98–107)
CO2 SERPL-SCNC: 25 MMOL/L — SIGNIFICANT CHANGE UP (ref 22–31)
CREAT SERPL-MCNC: 0.71 MG/DL — SIGNIFICANT CHANGE UP (ref 0.5–1.3)
GLUCOSE SERPL-MCNC: 69 MG/DL — LOW (ref 70–99)
HCT VFR BLD CALC: 41.4 % — SIGNIFICANT CHANGE UP (ref 34.5–45)
HGB BLD-MCNC: 13.7 G/DL — SIGNIFICANT CHANGE UP (ref 11.5–15.5)
MCHC RBC-ENTMCNC: 31.6 PG — SIGNIFICANT CHANGE UP (ref 27–34)
MCHC RBC-ENTMCNC: 33.1 % — SIGNIFICANT CHANGE UP (ref 32–36)
MCV RBC AUTO: 95.4 FL — SIGNIFICANT CHANGE UP (ref 80–100)
NRBC # FLD: 0 K/UL — SIGNIFICANT CHANGE UP (ref 0–0)
PLATELET # BLD AUTO: 187 K/UL — SIGNIFICANT CHANGE UP (ref 150–400)
PMV BLD: 11 FL — SIGNIFICANT CHANGE UP (ref 7–13)
POTASSIUM SERPL-MCNC: 3.8 MMOL/L — SIGNIFICANT CHANGE UP (ref 3.5–5.3)
POTASSIUM SERPL-SCNC: 3.8 MMOL/L — SIGNIFICANT CHANGE UP (ref 3.5–5.3)
RBC # BLD: 4.34 M/UL — SIGNIFICANT CHANGE UP (ref 3.8–5.2)
RBC # FLD: 12.6 % — SIGNIFICANT CHANGE UP (ref 10.3–14.5)
RH IG SCN BLD-IMP: POSITIVE — SIGNIFICANT CHANGE UP
SODIUM SERPL-SCNC: 139 MMOL/L — SIGNIFICANT CHANGE UP (ref 135–145)
WBC # BLD: 4.85 K/UL — SIGNIFICANT CHANGE UP (ref 3.8–10.5)
WBC # FLD AUTO: 4.85 K/UL — SIGNIFICANT CHANGE UP (ref 3.8–10.5)

## 2020-01-15 PROCEDURE — 93010 ELECTROCARDIOGRAM REPORT: CPT

## 2020-01-15 RX ORDER — SIMVASTATIN 20 MG/1
1 TABLET, FILM COATED ORAL
Qty: 0 | Refills: 0 | DISCHARGE

## 2020-01-15 RX ORDER — LOSARTAN POTASSIUM 100 MG/1
1 TABLET, FILM COATED ORAL
Qty: 0 | Refills: 0 | DISCHARGE

## 2020-01-15 RX ORDER — METOPROLOL TARTRATE 50 MG
1 TABLET ORAL
Qty: 0 | Refills: 0 | DISCHARGE

## 2020-01-15 RX ORDER — ASPIRIN/CALCIUM CARB/MAGNESIUM 324 MG
1 TABLET ORAL
Qty: 0 | Refills: 0 | DISCHARGE

## 2020-01-15 NOTE — H&P PST ADULT - NEGATIVE GASTROINTESTINAL SYMPTOMS
no abdominal pain/no change in bowel habits no nausea/no abdominal pain/no vomiting/no jaundice/no change in bowel habits

## 2020-01-15 NOTE — H&P PST ADULT - NEGATIVE GENERAL GENITOURINARY SYMPTOMS
no dysuria/normal urinary frequency no flank pain R/no dysuria/no flank pain L/normal urinary frequency/no hematuria/no bladder infections

## 2020-01-15 NOTE — H&P PST ADULT - NEGATIVE MUSCULOSKELETAL SYMPTOMS
no neck pain/no joint swelling no muscle cramps/no joint swelling/no neck pain no muscle cramps/no joint swelling/no arm pain L/no neck pain/no arm pain R/no leg pain R

## 2020-01-15 NOTE — H&P PST ADULT - NEUROLOGICAL SYMPTOMS
weakness/numbness and tingling to left leg, pain radiating on movement/difficulty walking difficulty walking/paresthesias/numbness and tingling to left leg, pain radiating on movement/weakness

## 2020-01-15 NOTE — H&P PST ADULT - NEGATIVE NEUROLOGICAL SYMPTOMS
no tremors/no weakness/no vertigo/no loss of sensation/no difficulty walking/no headache no headache/no vertigo/no focal seizures/no tremors/no generalized seizures

## 2020-01-15 NOTE — H&P PST ADULT - MUSCULOSKELETAL
details… No joint pain, swelling or deformity; no limitation of movement detailed exam no joint swelling/no joint warmth/no calf tenderness/decreased ROM due to pain no calf tenderness/Chronic back pain radiating down left leg/no joint swelling/no joint warmth/decreased ROM due to pain

## 2020-01-15 NOTE — H&P PST ADULT - RS GEN PE MLT RESP DETAILS PC
no wheezes/no rales/no rhonchi/breath sounds equal/clear to auscultation bilaterally/respirations non-labored/airway patent/good air movement

## 2020-01-15 NOTE — H&P PST ADULT - REASON FOR ADMISSION
Chronic lower back pain that radiates down left leg Chronic Pain Syndrome, Chronic lower back pain that radiates down left leg

## 2020-01-15 NOTE — H&P PST ADULT - NEGATIVE OPHTHALMOLOGIC SYMPTOMS
no diplopia/no loss of vision R/no loss of vision L/no photophobia no loss of vision L/no loss of vision R/no diplopia/no photophobia/no pain R/no blurred vision L/no discharge L/no blurred vision R/no discharge R/no pain L

## 2020-01-15 NOTE — H&P PST ADULT - NEGATIVE ENMT SYMPTOMS
no post-nasal discharge/no gum bleeding/no dry mouth/no throat pain/no dysphagia/no hearing difficulty/no nasal congestion/no recurrent cold sores/no abnormal taste sensation/no tinnitus/no vertigo/no nasal discharge/no ear pain/no nasal obstruction/no nose bleeds/no sinus symptoms no gum bleeding/no throat pain/no hearing difficulty/no sinus symptoms/no ear pain/no tinnitus/no nasal discharge/no dysphagia/no nose bleeds/no vertigo

## 2020-01-15 NOTE — H&P PST ADULT - NSICDXPASTSURGICALHX_GEN_ALL_CORE_FT
PAST SURGICAL HISTORY:  H/O carotid endarterectomy Lt 2016    History of cataract extraction bilateral, 2018 PAST SURGICAL HISTORY:  H/O carotid endarterectomy Lt 2016    History of cataract extraction bilateral, 2018    S/P lumbar laminectomy 6/2019

## 2020-01-15 NOTE — H&P PST ADULT - NEUROLOGICAL COMMENTS
Walks slowly due to back and left leg pain Walks slowly due to back and left leg pain. Also has h/o CVA 2013, denies any deficits

## 2020-01-15 NOTE — H&P PST ADULT - NEGATIVE FEMALE-SPECIFIC SYMPTOMS
no pelvic pain/no abnormal vaginal bleeding no pelvic pain/no vaginal discharge/no abnormal vaginal bleeding

## 2020-01-15 NOTE — H&P PST ADULT - MS GEN HX ROS MEA POS PC
back pain back pain/left anterior lateral pain and itching, no rash, skin intact/leg pain L back pain/leg pain L/left leg anterior lateral pain and itching, no rash, skin intact

## 2020-01-15 NOTE — H&P PST ADULT - NEGATIVE ALLERGY TYPES
no outdoor environmental allergies/no indoor environmental allergies/no reactions to medicines/no reactions to food see allergy section/no outdoor environmental allergies/no indoor environmental allergies/no reactions to medicines/no reactions to food

## 2020-01-15 NOTE — H&P PST ADULT - NSICDXPASTMEDICALHX_GEN_ALL_CORE_FT
PAST MEDICAL HISTORY:  Anxiety     Discogenic low back pain     HLD (hyperlipidemia)     Hypertension     Lumbar radiculopathy     Osteoporosis     Stroke 2013 minor PAST MEDICAL HISTORY:  Anxiety     Chronic pain syndrome     Discogenic low back pain     HLD (hyperlipidemia)     Hypertension     Lumbar radiculopathy     Osteoporosis     Stroke 2013 minor

## 2020-01-15 NOTE — H&P PST ADULT - ASSESSMENT
LUMBAR LAMINECTOMY L3-4 LEFT L2-3 DECOMPRESSION ON 6/4/19 WITH DR HENRY AT [facility] Chronic Pain Syndrome

## 2020-01-15 NOTE — H&P PST ADULT - NSICDXPROBLEM_GEN_ALL_CORE_FT
PROBLEM DIAGNOSES  Problem: Discogenic low back pain  Assessment and Plan: Lumbar laminectomy L3-4 left and L2-3 decompression on 6/4/19  Diagnostics will be forwarded for medical clearance  Spoke to NAEEM Stephens related to aspirin therapy  Spoke to PCP; he will give instructions for aspirin 81 mg, states no further cardiac testing necessary  Instructions were reviewed and signed  Best wishes offered PROBLEM DIAGNOSES  Problem: HTN (hypertension)  Assessment and Plan: Pt instructed to take her losartan, and metoprolol the morning of surgery with sips of water, pt able to verbalize understanding.     Problem: Chronic pain syndrome  Assessment and Plan: Patient is scheduled  for scheduled spinal cord stimulator placement on 1/22/2020. Pre-op instructions provided. Pt given verbal and written instructions with teach back on chlorhexidine soap and pepcid. Pt verbalized understanding with return demonstration.   PST requesting medical evaluation for uncontrolled HTN on antihypertensive medications, Surgeon notified.  BREA precautions, OR booking notified. PROBLEM DIAGNOSES  Problem: HTN (hypertension)  Assessment and Plan: Pt instructed to take her losartan, and metoprolol the morning of surgery with sips of water, pt able to verbalize understanding.     Problem: Chronic pain syndrome  Assessment and Plan: Patient is scheduled  for scheduled spinal cord stimulator placement on 1/22/2020. Pre-op instructions provided. Pt given verbal and written instructions with teach back on chlorhexidine soap and pepcid. Pt verbalized understanding with return demonstration.   PST requesting medical evaluation for uncontrolled HTN on antihypertensive medications, Surgeon notified via email. Patient with h/o stroke, instructed to continue ASA ,unless otherwise instructed by PCP, surgeon notified by email.  BREA precautions, OR booking notified.

## 2020-01-15 NOTE — H&P PST ADULT - NEGATIVE IMMUNOLOGICAL SYMPTOMS
no recurring infections/no persistent infections no persistent infections/no recurring infections/no recurring pneumonia

## 2020-01-15 NOTE — H&P PST ADULT - NEGATIVE PSYCHIATRIC SYMPTOMS
no anxiety/no paranoia/no memory loss/no mood swings/no agitation/no depression/no insomnia no depression/no insomnia/no paranoia/no mood swings/no memory loss/no suicidal ideation/no agitation

## 2020-01-15 NOTE — H&P PST ADULT - HISTORY OF PRESENT ILLNESS
82 yo female presents to PST at [facility] for scheduled lumbar laminectomy L3-4 left L2-3 decompression on 6/4/19 with Dr Mendez.   Reports lower back pain with radiation to [id number] left leg since January 2019 for which she has tried epidural steroid injections.  Reports worst pain with weight bearing and sitting. 83 yo female with history of lumbar laminectomy L3-4 left L2-3 decompression on 6/4/19, with continued chronic lower back pain that continues to radiate down left leg, who presents to [facility] for evaluation for scheduled spinal cord stimulator placement on 1/22/2020. Patient reports unrelieved back and left leg pain that is worse with weight bearing and sitting.

## 2020-01-15 NOTE — H&P PST ADULT - NEGATIVE GENERAL SYMPTOMS
no fatigue/no malaise no fever/no malaise/no fatigue/no weight loss/no chills/no weight gain/no sweating

## 2020-01-21 NOTE — ASU PATIENT PROFILE, ADULT - NS PRO INFO GIVEN TO
Shahid (son) - as per family, pt told him that the surgery was cancelled but son is unsure why. Left message with Dr. Hyatt's office to call back. Awaiting call./family

## 2020-01-22 ENCOUNTER — APPOINTMENT (OUTPATIENT)
Dept: NEUROSURGERY | Facility: HOSPITAL | Age: 85
End: 2020-01-22

## 2020-02-11 ENCOUNTER — TRANSCRIPTION ENCOUNTER (OUTPATIENT)
Age: 85
End: 2020-02-11

## 2020-02-12 ENCOUNTER — OUTPATIENT (OUTPATIENT)
Dept: OUTPATIENT SERVICES | Facility: HOSPITAL | Age: 85
LOS: 1 days | Discharge: HOME | End: 2020-02-12
Payer: MEDICARE

## 2020-02-12 ENCOUNTER — APPOINTMENT (OUTPATIENT)
Dept: NEUROSURGERY | Facility: HOSPITAL | Age: 85
End: 2020-02-12

## 2020-02-12 VITALS
OXYGEN SATURATION: 95 % | DIASTOLIC BLOOD PRESSURE: 76 MMHG | HEART RATE: 69 BPM | SYSTOLIC BLOOD PRESSURE: 171 MMHG | HEIGHT: 61 IN | WEIGHT: 171.96 LBS | RESPIRATION RATE: 14 BRPM | TEMPERATURE: 98 F

## 2020-02-12 VITALS
OXYGEN SATURATION: 96 % | HEART RATE: 66 BPM | SYSTOLIC BLOOD PRESSURE: 126 MMHG | RESPIRATION RATE: 18 BRPM | DIASTOLIC BLOOD PRESSURE: 56 MMHG

## 2020-02-12 DIAGNOSIS — G89.4 CHRONIC PAIN SYNDROME: ICD-10-CM

## 2020-02-12 DIAGNOSIS — Z98.890 OTHER SPECIFIED POSTPROCEDURAL STATES: Chronic | ICD-10-CM

## 2020-02-12 DIAGNOSIS — Z98.49 CATARACT EXTRACTION STATUS, UNSPECIFIED EYE: Chronic | ICD-10-CM

## 2020-02-12 PROCEDURE — 63685 INS/RPLC SPI NPG/RCVR POCKET: CPT

## 2020-02-12 PROCEDURE — 63650 IMPLANT NEUROELECTRODES: CPT | Mod: 59

## 2020-02-12 PROCEDURE — 95972 ALYS CPLX SP/PN NPGT W/PRGRM: CPT | Mod: 59

## 2020-02-12 RX ORDER — ACETAMINOPHEN 500 MG
650 TABLET ORAL EVERY 6 HOURS
Refills: 0 | Status: DISCONTINUED | OUTPATIENT
Start: 2020-02-12 | End: 2020-03-03

## 2020-02-12 RX ORDER — ONDANSETRON 8 MG/1
4 TABLET, FILM COATED ORAL ONCE
Refills: 0 | Status: COMPLETED | OUTPATIENT
Start: 2020-02-12 | End: 2020-02-12

## 2020-02-12 RX ORDER — SODIUM CHLORIDE 9 MG/ML
500 INJECTION, SOLUTION INTRAVENOUS ONCE
Refills: 0 | Status: COMPLETED | OUTPATIENT
Start: 2020-02-12 | End: 2020-02-12

## 2020-02-12 RX ORDER — CEPHALEXIN 500 MG
1 CAPSULE ORAL
Qty: 10 | Refills: 0
Start: 2020-02-12 | End: 2020-02-16

## 2020-02-12 RX ORDER — DEXTROSE MONOHYDRATE, SODIUM CHLORIDE, AND POTASSIUM CHLORIDE 50; .745; 4.5 G/1000ML; G/1000ML; G/1000ML
1000 INJECTION, SOLUTION INTRAVENOUS
Refills: 0 | Status: DISCONTINUED | OUTPATIENT
Start: 2020-02-12 | End: 2020-03-03

## 2020-02-12 RX ORDER — FENTANYL CITRATE 50 UG/ML
25 INJECTION INTRAVENOUS
Refills: 0 | Status: DISCONTINUED | OUTPATIENT
Start: 2020-02-12 | End: 2020-02-12

## 2020-02-12 RX ORDER — ASPIRIN/CALCIUM CARB/MAGNESIUM 324 MG
1 TABLET ORAL
Qty: 0 | Refills: 0 | DISCHARGE

## 2020-02-12 RX ORDER — METOPROLOL TARTRATE 50 MG
1 TABLET ORAL
Qty: 0 | Refills: 0 | DISCHARGE

## 2020-02-12 RX ORDER — OXYCODONE AND ACETAMINOPHEN 5; 325 MG/1; MG/1
1 TABLET ORAL EVERY 4 HOURS
Refills: 0 | Status: DISCONTINUED | OUTPATIENT
Start: 2020-02-12 | End: 2020-02-12

## 2020-02-12 RX ORDER — SIMVASTATIN 20 MG/1
1 TABLET, FILM COATED ORAL
Qty: 0 | Refills: 0 | DISCHARGE

## 2020-02-12 RX ORDER — OXYCODONE HYDROCHLORIDE 5 MG/1
5 TABLET ORAL ONCE
Refills: 0 | Status: DISCONTINUED | OUTPATIENT
Start: 2020-02-12 | End: 2020-02-12

## 2020-02-12 RX ORDER — HYDRALAZINE HCL 50 MG
10 TABLET ORAL ONCE
Refills: 0 | Status: COMPLETED | OUTPATIENT
Start: 2020-02-12 | End: 2020-02-12

## 2020-02-12 RX ORDER — OXYCODONE HYDROCHLORIDE 5 MG/1
1 TABLET ORAL
Qty: 16 | Refills: 0
Start: 2020-02-12 | End: 2020-02-15

## 2020-02-12 RX ORDER — SODIUM CHLORIDE 9 MG/ML
1000 INJECTION, SOLUTION INTRAVENOUS
Refills: 0 | Status: DISCONTINUED | OUTPATIENT
Start: 2020-02-12 | End: 2020-03-03

## 2020-02-12 RX ORDER — LABETALOL HCL 100 MG
20 TABLET ORAL ONCE
Refills: 0 | Status: COMPLETED | OUTPATIENT
Start: 2020-02-12 | End: 2020-02-12

## 2020-02-12 RX ORDER — OXYCODONE AND ACETAMINOPHEN 5; 325 MG/1; MG/1
2 TABLET ORAL EVERY 6 HOURS
Refills: 0 | Status: DISCONTINUED | OUTPATIENT
Start: 2020-02-12 | End: 2020-02-12

## 2020-02-12 RX ORDER — LOSARTAN POTASSIUM 100 MG/1
1 TABLET, FILM COATED ORAL
Qty: 0 | Refills: 0 | DISCHARGE

## 2020-02-12 RX ADMIN — SODIUM CHLORIDE 1000 MILLILITER(S): 9 INJECTION, SOLUTION INTRAVENOUS at 21:05

## 2020-02-12 RX ADMIN — ONDANSETRON 4 MILLIGRAM(S): 8 TABLET, FILM COATED ORAL at 20:09

## 2020-02-12 RX ADMIN — Medication 20 MILLIGRAM(S): at 20:17

## 2020-02-12 RX ADMIN — Medication 10 MILLIGRAM(S): at 20:58

## 2020-02-12 NOTE — ASU DISCHARGE PLAN (ADULT/PEDIATRIC) - CALL YOUR DOCTOR IF YOU HAVE ANY OF THE FOLLOWING:
Wound/Surgical Site with redness, or foul smelling discharge or pus/Bleeding that does not stop Inability to tolerate liquids or foods/Fever greater than (need to indicate Fahrenheit or Celsius)/Bleeding that does not stop/Wound/Surgical Site with redness, or foul smelling discharge or pus/Nausea and vomiting that does not stop/Pain not relieved by Medications

## 2020-02-12 NOTE — ASU PREOP CHECKLIST - ISOLATION PRECAUTIONS
NOTES FOR VISIT:   - Patient deferred treatment at this time as they are not bothersome at this time.    - We discussed the nature of the benign lesion(s) and removal options if they become symptomatic including painful, itchy, bothersome or start bleeding.      No images are attached to the encounter.      FOLLOW-UP: Return if symptoms worsen or fail to improve.    Mario was seen today for derm problem.    Diagnoses and all orders for this visit:    Inflamed skin tag         There are no discontinued medications.     Chief Complaint   Patient presents with   • Derm Problem     new patient, lesions       HISTORY: Mario is here today for:  - Lesions on the right eyelid, neck and under arms, present for years. Patient states that they are bothersome and irritated with clothing and jewelry. Patient has another lesion in the groin/thigh that he does not want treated as he is a diabetic and is susceptible to infections, and patient does not want it evaluated.     PAST DERMATOLOGY-SPECIFIC HISTORY:  None    [place] worked mainly indoors and outdoors. They enjoy average amount of recreational sun exposure.   REVIEW OF SYSTEMS:  Any lesion removed from the skin: Yes (lipoma)   Skin:blistering sunburn (EVER in lifetime) and any lesion ever removed from skin  General:No headaches, swollen glands, night sweats, weight loss, or weight gain.    ACTIVE DERMATOLOGY CONDITIONS AND PRESCRIPTIONS:  N/A    PAST MEDICAL HISTORY, PAST SURGICAL HISTORY, SOCIAL HISTORY, AND FAMILY HISTORY WERE REVIEWED.    PHYSICAL EXAMINATION: CONSTITUTIONAL:  Mario is a 53 year old male who is well developed, well nourished, in no acute distress.  There were no vitals taken for this visit.. NEUROLOGIC AND PSYCHIATRIC: He has a normal mood and affect. SKIN: Complete examination, including palpation and careful visual examination of the area of interest revealed no other significant abnormality or eruption. I noted:    -  Mario had 2-5 mm pedunculated, flesh-toned to brown papules on his neck, axillae, right upper lateral eyelid (1), about 15-20 total.      On 9/11/2019, I Michelle Goode LPN scribed the services personally performed by Dr. Rojelio Reilly MD      The documentation recorded by the scribe accurately and completely reflects the service(s) I personally performed and the decisions made by me.        none

## 2020-02-19 ENCOUNTER — APPOINTMENT (OUTPATIENT)
Dept: NEUROSURGERY | Facility: CLINIC | Age: 85
End: 2020-02-19
Payer: MEDICARE

## 2020-02-19 VITALS
HEIGHT: 65 IN | BODY MASS INDEX: 26.66 KG/M2 | HEART RATE: 66 BPM | DIASTOLIC BLOOD PRESSURE: 77 MMHG | SYSTOLIC BLOOD PRESSURE: 176 MMHG | WEIGHT: 160 LBS

## 2020-02-19 DIAGNOSIS — M54.16 RADICULOPATHY, LUMBAR REGION: ICD-10-CM

## 2020-02-19 PROCEDURE — 99024 POSTOP FOLLOW-UP VISIT: CPT

## 2020-02-19 NOTE — HISTORY OF PRESENT ILLNESS
[de-identified] : Ms. Alberto is an 83 yo female with PMH of HTN, HLD, peptic ulcer, osteoporosis, s/p 6/5/19 L2-L4 laminectomy (Dr. Garcia), who arrives for initial post op visit s/p 2/12/20 SCS permanent percutaneous Nevro with IPG in Left flank.  The surgical incisions are healing well, well approximated, prineo/dermabond intact with a healing closed blister  just inferior to the left flank dressing.  No edema, erythema, warmth, or drainage.  She is at her baseline as far as chronic pain with mild incisional discomfort.  \par \par She underwent SCS trial with Dr. Grady and reports 50% pain relief with significant improvement of performing ADLs and decrease dysesthesia. Previously she was seen by Dr. Mackay who recommend multilevel interbody fusion for scoliosis/spondylolisthesis but patient/family do not wish to pursue invasive spinal surgery at this time.\par \par Baseline symptoms\par Severe (7-10/10) pain (burning and heaviness) on lower back radiating to b/l buttocks/hips/thigh (L>R).\par Pain is worsening by sitting/standing/walking and alleviated by resting/lying on her right side.\par Pain has been progressively worsening for one year and causing significant gait/ADLs performance disturbance.\par Denies weakness on LE, bowel/bladder dysfunction, saddle anesthesia.\par She ambulates without assistive device and performs ADLs independently.

## 2020-02-19 NOTE — REASON FOR VISIT
[Family Member] : family member [Spouse] : spouse [de-identified] : s/p 2/12/19 SCS perm/perc Nevro Thoracic

## 2020-02-19 NOTE — REVIEW OF SYSTEMS
[Difficulty Walking] : difficulty walking [Abnormal Sensation] : an abnormal sensation [As Noted in HPI] : as noted in HPI [Negative] : Endocrine [FreeTextEntry9] : lower back pain radiating to b/l LE (L>R)

## 2020-02-19 NOTE — PHYSICAL EXAM
[General Appearance - Well Nourished] : well nourished [General Appearance - Alert] : alert [Impaired Insight] : insight and judgment were intact [Affect] : the affect was normal [Oriented To Time, Place, And Person] : oriented to person, place, and time [Memory Recent] : recent memory was not impaired [Cranial Nerves Optic (II)] : visual acuity intact bilaterally,  pupils equal round and reactive to light [Cranial Nerves Oculomotor (III)] : extraocular motion intact [Cranial Nerves Vestibulocochlear (VIII)] : hearing was intact bilaterally [Cranial Nerves Facial (VII)] : face symmetrical [Cranial Nerves Trigeminal (V)] : facial sensation intact symmetrically [Cranial Nerves Accessory (XI - Cranial And Spinal)] : head turning and shoulder shrug symmetric [Cranial Nerves Hypoglossal (XII)] : there was no tongue deviation with protrusion [Motor Tone] : muscle tone was normal in all four extremities [Cranial Nerves Glossopharyngeal (IX)] : tongue and palate midline [4] : L3 quadriceps 4/5 [5] : S1 ankle flexors 5/5 [Limited Balance] : the patient's balance was impaired [No Visual Abnormalities] : no visible abnormailities [Normal] : normal [PERRL With Normal Accommodation] : pupils were equal in size, round, reactive to light, with normal accommodation [Hearing Threshold Finger Rub Not [place]] : hearing was normal [Examination Of The Oral Cavity] : the lips and gums were normal [] : no respiratory distress [Edema] : there was no peripheral edema [Abnormal Walk] : normal gait [Clean] : clean [Dry] : dry [Healing Well] : healing well [Intact] : intact [No Drainage] : without drainage [Motor Strength] : muscle strength was normal in all four extremities [Antalgic] : antalgic [Skin Color & Pigmentation] : normal skin color and pigmentation [Erythema] : not erythematous [Warm] : not warm [FreeTextEntry1] : mild LLE weakness 2/2 pain

## 2020-02-19 NOTE — ASSESSMENT
[FreeTextEntry1] : Return 2/25/20 for wound check.  The patients daughter and  went over Nevro device education with the rep Self today.  We will plan for programming to start next week after the wound check.  They also know to schedule f/u with Dr. Russo for medical management and additional programming adjustments once initiated.\par \par 1)  RTO 2/25/20 at 03:30 for wound check - Rojas Self will attend

## 2020-02-24 ENCOUNTER — FORM ENCOUNTER (OUTPATIENT)
Age: 85
End: 2020-02-24

## 2020-02-25 ENCOUNTER — APPOINTMENT (OUTPATIENT)
Dept: RADIOLOGY | Facility: CLINIC | Age: 85
End: 2020-02-25
Payer: MEDICARE

## 2020-02-25 ENCOUNTER — APPOINTMENT (OUTPATIENT)
Dept: NEUROSURGERY | Facility: CLINIC | Age: 85
End: 2020-02-25
Payer: MEDICARE

## 2020-02-25 ENCOUNTER — OUTPATIENT (OUTPATIENT)
Dept: OUTPATIENT SERVICES | Facility: HOSPITAL | Age: 85
LOS: 1 days | End: 2020-02-25
Payer: MEDICARE

## 2020-02-25 VITALS — DIASTOLIC BLOOD PRESSURE: 85 MMHG | SYSTOLIC BLOOD PRESSURE: 177 MMHG | HEART RATE: 69 BPM

## 2020-02-25 VITALS
DIASTOLIC BLOOD PRESSURE: 97 MMHG | WEIGHT: 160 LBS | OXYGEN SATURATION: 96 % | HEIGHT: 65 IN | SYSTOLIC BLOOD PRESSURE: 199 MMHG | HEART RATE: 79 BPM | BODY MASS INDEX: 26.66 KG/M2 | RESPIRATION RATE: 14 BRPM

## 2020-02-25 DIAGNOSIS — Z98.890 OTHER SPECIFIED POSTPROCEDURAL STATES: ICD-10-CM

## 2020-02-25 DIAGNOSIS — Z98.890 OTHER SPECIFIED POSTPROCEDURAL STATES: Chronic | ICD-10-CM

## 2020-02-25 DIAGNOSIS — Z46.9 ENCOUNTER FOR FITTING AND ADJUSTMENT OF UNSPECIFIED DEVICE: ICD-10-CM

## 2020-02-25 DIAGNOSIS — Z98.49 CATARACT EXTRACTION STATUS, UNSPECIFIED EYE: Chronic | ICD-10-CM

## 2020-02-25 DIAGNOSIS — M54.16 RADICULOPATHY, LUMBAR REGION: ICD-10-CM

## 2020-02-25 PROCEDURE — 72070 X-RAY EXAM THORAC SPINE 2VWS: CPT | Mod: 26

## 2020-02-25 PROCEDURE — 99214 OFFICE O/P EST MOD 30 MIN: CPT

## 2020-02-25 PROCEDURE — 72070 X-RAY EXAM THORAC SPINE 2VWS: CPT

## 2020-02-25 PROCEDURE — 95972 ALYS CPLX SP/PN NPGT W/PRGRM: CPT

## 2020-02-28 PROBLEM — Z46.9 DEVICE FITTING OR ADJUSTMENT: Status: ACTIVE | Noted: 2020-02-28

## 2020-02-28 PROBLEM — Z98.890 S/P INSERTION OF SPINAL CORD STIMULATOR: Status: ACTIVE | Noted: 2020-02-19

## 2020-03-03 NOTE — HISTORY OF PRESENT ILLNESS
[de-identified] : Ms. Alberto is an 85 yo female with PMH of HTN, HLD, peptic ulcer, osteoporosis, s/p 6/5/19 L2-L4 laminectomy (Dr. Garcia), who arrives for initial post op visit s/p 2/12/20 SCS permanent percutaneous Nevro with IPG in Left flank.  The surgical incisions are healing well, well approximated, prineo/dermabond intact, previous blister flat, scabbed over, with no fluid as before.  No edema, erythema, warmth, or drainage.  She is at her baseline as far as chronic pain with mild incisional discomfort.  \par \par She underwent SCS trial with Dr. Grady and reports 50% pain relief with significant improvement of performing ADLs and decrease dysesthesia. Previously she was seen by Dr. Mackay who recommend multilevel interbody fusion for scoliosis/spondylolisthesis but patient/family do not wish to pursue invasive spinal surgery at this time.\par \par Baseline symptoms\par Severe (7-10/10) pain (burning and heaviness) on lower back radiating to b/l buttocks/hips/thigh (L>R).\par Pain is worsening by sitting/standing/walking and alleviated by resting/lying on her right side.\par Pain has been progressively worsening for one year and causing significant gait/ADLs performance disturbance.\par Denies weakness on LE, bowel/bladder dysfunction, saddle anesthesia.\par She ambulates without assistive device and performs ADLs independently.

## 2020-03-03 NOTE — ASSESSMENT
[FreeTextEntry1] : Initial programming today with Rojas Perry present with daughter and  in attendance.  Reviewed all post operative wound care instructions again, and nevro rep educated on device with all questions answered.\par \par Program 1 - 4)  30 PW, 2.0 mA, 10k Hz\par \par 1)  Xrays Thoracic A/P lat - 3 months\par 2)  RTO 3 months

## 2020-03-03 NOTE — PHYSICAL EXAM
[General Appearance - Alert] : alert [Clean] : clean [General Appearance - Well Nourished] : well nourished [Dry] : dry [Healing Well] : healing well [Intact] : intact [No Drainage] : without drainage [Oriented To Time, Place, And Person] : oriented to person, place, and time [Impaired Insight] : insight and judgment were intact [Affect] : the affect was normal [Cranial Nerves Optic (II)] : visual acuity intact bilaterally,  pupils equal round and reactive to light [Memory Recent] : recent memory was not impaired [Cranial Nerves Oculomotor (III)] : extraocular motion intact [Cranial Nerves Trigeminal (V)] : facial sensation intact symmetrically [Cranial Nerves Facial (VII)] : face symmetrical [Cranial Nerves Vestibulocochlear (VIII)] : hearing was intact bilaterally [Cranial Nerves Glossopharyngeal (IX)] : tongue and palate midline [Cranial Nerves Accessory (XI - Cranial And Spinal)] : head turning and shoulder shrug symmetric [Cranial Nerves Hypoglossal (XII)] : there was no tongue deviation with protrusion [Motor Tone] : muscle tone was normal in all four extremities [Motor Strength] : muscle strength was normal in all four extremities [4] : L3 quadriceps 4/5 [5] : S1 ankle flexors 5/5 [Limited Balance] : the patient's balance was impaired [No Visual Abnormalities] : no visible abnormailities [Normal] : normal [PERRL With Normal Accommodation] : pupils were equal in size, round, reactive to light, with normal accommodation [Antalgic] : antalgic [Hearing Threshold Finger Rub Not [place]] : hearing was normal [Examination Of The Oral Cavity] : the lips and gums were normal [Edema] : there was no peripheral edema [] : no respiratory distress [Abnormal Walk] : normal gait [Skin Color & Pigmentation] : normal skin color and pigmentation [Warm] : not warm [Erythema] : not erythematous [FreeTextEntry1] : mild LLE weakness 2/2 pain

## 2020-03-03 NOTE — REVIEW OF SYSTEMS
[As Noted in HPI] : as noted in HPI [Difficulty Walking] : difficulty walking [Abnormal Sensation] : an abnormal sensation [Negative] : Heme/Lymph [FreeTextEntry9] : lower back pain radiating to b/l LE (L>R)

## 2020-05-08 ENCOUNTER — TRANSCRIPTION ENCOUNTER (OUTPATIENT)
Age: 85
End: 2020-05-08

## 2020-05-09 ENCOUNTER — APPOINTMENT (OUTPATIENT)
Dept: NEUROSURGERY | Facility: HOSPITAL | Age: 85
End: 2020-05-09

## 2020-05-09 ENCOUNTER — INPATIENT (INPATIENT)
Facility: HOSPITAL | Age: 85
LOS: 18 days | Discharge: NURSING, REHAB OR LONG-TERM CARE | DRG: 23 | End: 2020-05-28
Attending: SPECIALIST | Admitting: STUDENT IN AN ORGANIZED HEALTH CARE EDUCATION/TRAINING PROGRAM
Payer: MEDICARE

## 2020-05-09 VITALS
OXYGEN SATURATION: 100 % | DIASTOLIC BLOOD PRESSURE: 65 MMHG | RESPIRATION RATE: 11 BRPM | SYSTOLIC BLOOD PRESSURE: 125 MMHG | HEART RATE: 60 BPM

## 2020-05-09 DIAGNOSIS — Z98.49 CATARACT EXTRACTION STATUS, UNSPECIFIED EYE: Chronic | ICD-10-CM

## 2020-05-09 DIAGNOSIS — I61.9 NONTRAUMATIC INTRACEREBRAL HEMORRHAGE, UNSPECIFIED: ICD-10-CM

## 2020-05-09 DIAGNOSIS — Z98.890 OTHER SPECIFIED POSTPROCEDURAL STATES: Chronic | ICD-10-CM

## 2020-05-09 LAB
ALBUMIN SERPL ELPH-MCNC: 3.9 G/DL — SIGNIFICANT CHANGE UP (ref 3.3–5)
ALBUMIN SERPL ELPH-MCNC: 4.1 G/DL — SIGNIFICANT CHANGE UP (ref 3.3–5)
ALP SERPL-CCNC: 50 U/L — SIGNIFICANT CHANGE UP (ref 40–120)
ALP SERPL-CCNC: 54 U/L — SIGNIFICANT CHANGE UP (ref 40–120)
ALT FLD-CCNC: 12 U/L — SIGNIFICANT CHANGE UP (ref 10–45)
ALT FLD-CCNC: 13 U/L — SIGNIFICANT CHANGE UP (ref 10–45)
ANION GAP SERPL CALC-SCNC: 13 MMOL/L — SIGNIFICANT CHANGE UP (ref 5–17)
ANION GAP SERPL CALC-SCNC: 14 MMOL/L — SIGNIFICANT CHANGE UP (ref 5–17)
APTT BLD: 28.7 SEC — SIGNIFICANT CHANGE UP (ref 27.5–36.3)
AST SERPL-CCNC: 23 U/L — SIGNIFICANT CHANGE UP (ref 10–40)
AST SERPL-CCNC: 26 U/L — SIGNIFICANT CHANGE UP (ref 10–40)
BASOPHILS # BLD AUTO: 0.01 K/UL — SIGNIFICANT CHANGE UP (ref 0–0.2)
BASOPHILS # BLD AUTO: 0.01 K/UL — SIGNIFICANT CHANGE UP (ref 0–0.2)
BASOPHILS NFR BLD AUTO: 0.1 % — SIGNIFICANT CHANGE UP (ref 0–2)
BASOPHILS NFR BLD AUTO: 0.1 % — SIGNIFICANT CHANGE UP (ref 0–2)
BILIRUB SERPL-MCNC: 0.4 MG/DL — SIGNIFICANT CHANGE UP (ref 0.2–1.2)
BILIRUB SERPL-MCNC: 0.6 MG/DL — SIGNIFICANT CHANGE UP (ref 0.2–1.2)
BLD GP AB SCN SERPL QL: NEGATIVE — SIGNIFICANT CHANGE UP
BUN SERPL-MCNC: 21 MG/DL — SIGNIFICANT CHANGE UP (ref 7–23)
BUN SERPL-MCNC: 24 MG/DL — HIGH (ref 7–23)
CALCIUM SERPL-MCNC: 8.9 MG/DL — SIGNIFICANT CHANGE UP (ref 8.4–10.5)
CALCIUM SERPL-MCNC: 9.2 MG/DL — SIGNIFICANT CHANGE UP (ref 8.4–10.5)
CHLORIDE SERPL-SCNC: 104 MMOL/L — SIGNIFICANT CHANGE UP (ref 96–108)
CHLORIDE SERPL-SCNC: 106 MMOL/L — SIGNIFICANT CHANGE UP (ref 96–108)
CO2 SERPL-SCNC: 20 MMOL/L — LOW (ref 22–31)
CO2 SERPL-SCNC: 22 MMOL/L — SIGNIFICANT CHANGE UP (ref 22–31)
CREAT SERPL-MCNC: 0.47 MG/DL — LOW (ref 0.5–1.3)
CREAT SERPL-MCNC: 0.54 MG/DL — SIGNIFICANT CHANGE UP (ref 0.5–1.3)
EOSINOPHIL # BLD AUTO: 0 K/UL — SIGNIFICANT CHANGE UP (ref 0–0.5)
EOSINOPHIL # BLD AUTO: 0.01 K/UL — SIGNIFICANT CHANGE UP (ref 0–0.5)
EOSINOPHIL NFR BLD AUTO: 0 % — SIGNIFICANT CHANGE UP (ref 0–6)
EOSINOPHIL NFR BLD AUTO: 0.1 % — SIGNIFICANT CHANGE UP (ref 0–6)
GAS PNL BLDA: SIGNIFICANT CHANGE UP
GAS PNL BLDA: SIGNIFICANT CHANGE UP
GLUCOSE SERPL-MCNC: 142 MG/DL — HIGH (ref 70–99)
GLUCOSE SERPL-MCNC: 144 MG/DL — HIGH (ref 70–99)
HCT VFR BLD CALC: 37.4 % — SIGNIFICANT CHANGE UP (ref 34.5–45)
HCT VFR BLD CALC: 41.2 % — SIGNIFICANT CHANGE UP (ref 34.5–45)
HGB BLD-MCNC: 12.7 G/DL — SIGNIFICANT CHANGE UP (ref 11.5–15.5)
HGB BLD-MCNC: 13.4 G/DL — SIGNIFICANT CHANGE UP (ref 11.5–15.5)
IMM GRANULOCYTES NFR BLD AUTO: 0.3 % — SIGNIFICANT CHANGE UP (ref 0–1.5)
IMM GRANULOCYTES NFR BLD AUTO: 0.9 % — SIGNIFICANT CHANGE UP (ref 0–1.5)
INR BLD: 0.94 RATIO — SIGNIFICANT CHANGE UP (ref 0.88–1.16)
LYMPHOCYTES # BLD AUTO: 0.53 K/UL — LOW (ref 1–3.3)
LYMPHOCYTES # BLD AUTO: 0.89 K/UL — LOW (ref 1–3.3)
LYMPHOCYTES # BLD AUTO: 11.4 % — LOW (ref 13–44)
LYMPHOCYTES # BLD AUTO: 7.4 % — LOW (ref 13–44)
MAGNESIUM SERPL-MCNC: 1.9 MG/DL — SIGNIFICANT CHANGE UP (ref 1.6–2.6)
MCHC RBC-ENTMCNC: 31 PG — SIGNIFICANT CHANGE UP (ref 27–34)
MCHC RBC-ENTMCNC: 31.8 PG — SIGNIFICANT CHANGE UP (ref 27–34)
MCHC RBC-ENTMCNC: 32.5 GM/DL — SIGNIFICANT CHANGE UP (ref 32–36)
MCHC RBC-ENTMCNC: 34 GM/DL — SIGNIFICANT CHANGE UP (ref 32–36)
MCV RBC AUTO: 93.7 FL — SIGNIFICANT CHANGE UP (ref 80–100)
MCV RBC AUTO: 95.4 FL — SIGNIFICANT CHANGE UP (ref 80–100)
MONOCYTES # BLD AUTO: 0.07 K/UL — SIGNIFICANT CHANGE UP (ref 0–0.9)
MONOCYTES # BLD AUTO: 0.35 K/UL — SIGNIFICANT CHANGE UP (ref 0–0.9)
MONOCYTES NFR BLD AUTO: 1 % — LOW (ref 2–14)
MONOCYTES NFR BLD AUTO: 4.5 % — SIGNIFICANT CHANGE UP (ref 2–14)
NEUTROPHILS # BLD AUTO: 6.48 K/UL — SIGNIFICANT CHANGE UP (ref 1.8–7.4)
NEUTROPHILS # BLD AUTO: 6.58 K/UL — SIGNIFICANT CHANGE UP (ref 1.8–7.4)
NEUTROPHILS NFR BLD AUTO: 83 % — HIGH (ref 43–77)
NEUTROPHILS NFR BLD AUTO: 91.2 % — HIGH (ref 43–77)
NRBC # BLD: 0 /100 WBCS — SIGNIFICANT CHANGE UP (ref 0–0)
NRBC # BLD: 0 /100 WBCS — SIGNIFICANT CHANGE UP (ref 0–0)
PHOSPHATE SERPL-MCNC: 4.5 MG/DL — SIGNIFICANT CHANGE UP (ref 2.5–4.5)
PLATELET # BLD AUTO: 182 K/UL — SIGNIFICANT CHANGE UP (ref 150–400)
PLATELET # BLD AUTO: 197 K/UL — SIGNIFICANT CHANGE UP (ref 150–400)
POTASSIUM SERPL-MCNC: 3.2 MMOL/L — LOW (ref 3.5–5.3)
POTASSIUM SERPL-MCNC: 3.5 MMOL/L — SIGNIFICANT CHANGE UP (ref 3.5–5.3)
POTASSIUM SERPL-SCNC: 3.2 MMOL/L — LOW (ref 3.5–5.3)
POTASSIUM SERPL-SCNC: 3.5 MMOL/L — SIGNIFICANT CHANGE UP (ref 3.5–5.3)
PROT SERPL-MCNC: 6.8 G/DL — SIGNIFICANT CHANGE UP (ref 6–8.3)
PROT SERPL-MCNC: 6.8 G/DL — SIGNIFICANT CHANGE UP (ref 6–8.3)
PROTHROM AB SERPL-ACNC: 10.7 SEC — SIGNIFICANT CHANGE UP (ref 10–12.9)
RBC # BLD: 3.99 M/UL — SIGNIFICANT CHANGE UP (ref 3.8–5.2)
RBC # BLD: 4.32 M/UL — SIGNIFICANT CHANGE UP (ref 3.8–5.2)
RBC # FLD: 12.7 % — SIGNIFICANT CHANGE UP (ref 10.3–14.5)
RBC # FLD: 12.8 % — SIGNIFICANT CHANGE UP (ref 10.3–14.5)
RH IG SCN BLD-IMP: POSITIVE — SIGNIFICANT CHANGE UP
RH IG SCN BLD-IMP: POSITIVE — SIGNIFICANT CHANGE UP
SARS-COV-2 RNA SPEC QL NAA+PROBE: SIGNIFICANT CHANGE UP
SODIUM SERPL-SCNC: 139 MMOL/L — SIGNIFICANT CHANGE UP (ref 135–145)
SODIUM SERPL-SCNC: 140 MMOL/L — SIGNIFICANT CHANGE UP (ref 135–145)
WBC # BLD: 7.21 K/UL — SIGNIFICANT CHANGE UP (ref 3.8–10.5)
WBC # BLD: 7.81 K/UL — SIGNIFICANT CHANGE UP (ref 3.8–10.5)
WBC # FLD AUTO: 7.21 K/UL — SIGNIFICANT CHANGE UP (ref 3.8–10.5)
WBC # FLD AUTO: 7.81 K/UL — SIGNIFICANT CHANGE UP (ref 3.8–10.5)

## 2020-05-09 PROCEDURE — 93010 ELECTROCARDIOGRAM REPORT: CPT

## 2020-05-09 PROCEDURE — 70450 CT HEAD/BRAIN W/O DYE: CPT | Mod: 26,59,77

## 2020-05-09 PROCEDURE — 71045 X-RAY EXAM CHEST 1 VIEW: CPT | Mod: 26,76

## 2020-05-09 PROCEDURE — 99291 CRITICAL CARE FIRST HOUR: CPT

## 2020-05-09 PROCEDURE — 61313 CRNEC/CRNOT STTL ICERE: CPT

## 2020-05-09 PROCEDURE — 61781 SCAN PROC CRANIAL INTRA: CPT

## 2020-05-09 RX ORDER — CHLORHEXIDINE GLUCONATE 213 G/1000ML
1 SOLUTION TOPICAL
Refills: 0 | Status: DISCONTINUED | OUTPATIENT
Start: 2020-05-09 | End: 2020-05-17

## 2020-05-09 RX ORDER — NICARDIPINE HYDROCHLORIDE 30 MG/1
5 CAPSULE, EXTENDED RELEASE ORAL
Qty: 40 | Refills: 0 | Status: DISCONTINUED | OUTPATIENT
Start: 2020-05-09 | End: 2020-05-12

## 2020-05-09 RX ORDER — NICARDIPINE HYDROCHLORIDE 30 MG/1
5 CAPSULE, EXTENDED RELEASE ORAL
Qty: 40 | Refills: 0 | Status: DISCONTINUED | OUTPATIENT
Start: 2020-05-09 | End: 2020-05-10

## 2020-05-09 RX ORDER — ACETAMINOPHEN 500 MG
650 TABLET ORAL EVERY 6 HOURS
Refills: 0 | Status: DISCONTINUED | OUTPATIENT
Start: 2020-05-09 | End: 2020-05-22

## 2020-05-09 RX ORDER — POLYETHYLENE GLYCOL 3350 17 G/17G
17 POWDER, FOR SOLUTION ORAL EVERY 12 HOURS
Refills: 0 | Status: DISCONTINUED | OUTPATIENT
Start: 2020-05-09 | End: 2020-05-22

## 2020-05-09 RX ORDER — ONDANSETRON 8 MG/1
4 TABLET, FILM COATED ORAL EVERY 6 HOURS
Refills: 0 | Status: DISCONTINUED | OUTPATIENT
Start: 2020-05-09 | End: 2020-05-28

## 2020-05-09 RX ORDER — PROPOFOL 10 MG/ML
30 INJECTION, EMULSION INTRAVENOUS
Qty: 1000 | Refills: 0 | Status: DISCONTINUED | OUTPATIENT
Start: 2020-05-09 | End: 2020-05-09

## 2020-05-09 RX ORDER — DEXAMETHASONE 0.5 MG/5ML
4 ELIXIR ORAL EVERY 6 HOURS
Refills: 0 | Status: DISCONTINUED | OUTPATIENT
Start: 2020-05-09 | End: 2020-05-10

## 2020-05-09 RX ORDER — DEXMEDETOMIDINE HYDROCHLORIDE IN 0.9% SODIUM CHLORIDE 4 UG/ML
0.2 INJECTION INTRAVENOUS
Qty: 200 | Refills: 0 | Status: DISCONTINUED | OUTPATIENT
Start: 2020-05-09 | End: 2020-05-12

## 2020-05-09 RX ORDER — LEVETIRACETAM 250 MG/1
500 TABLET, FILM COATED ORAL EVERY 12 HOURS
Refills: 0 | Status: DISCONTINUED | OUTPATIENT
Start: 2020-05-09 | End: 2020-05-10

## 2020-05-09 RX ORDER — DEXTROSE MONOHYDRATE, SODIUM CHLORIDE, AND POTASSIUM CHLORIDE 50; .745; 4.5 G/1000ML; G/1000ML; G/1000ML
1000 INJECTION, SOLUTION INTRAVENOUS
Refills: 0 | Status: DISCONTINUED | OUTPATIENT
Start: 2020-05-09 | End: 2020-05-12

## 2020-05-09 RX ORDER — PROPOFOL 10 MG/ML
20 INJECTION, EMULSION INTRAVENOUS ONCE
Refills: 0 | Status: COMPLETED | OUTPATIENT
Start: 2020-05-09 | End: 2020-05-09

## 2020-05-09 RX ORDER — POTASSIUM CHLORIDE 20 MEQ
10 PACKET (EA) ORAL
Refills: 0 | Status: DISCONTINUED | OUTPATIENT
Start: 2020-05-09 | End: 2020-05-09

## 2020-05-09 RX ORDER — SENNA PLUS 8.6 MG/1
2 TABLET ORAL AT BEDTIME
Refills: 0 | Status: DISCONTINUED | OUTPATIENT
Start: 2020-05-09 | End: 2020-05-22

## 2020-05-09 RX ORDER — DEXAMETHASONE 0.5 MG/5ML
4 ELIXIR ORAL EVERY 6 HOURS
Refills: 0 | Status: DISCONTINUED | OUTPATIENT
Start: 2020-05-09 | End: 2020-05-11

## 2020-05-09 RX ORDER — CHLORHEXIDINE GLUCONATE 213 G/1000ML
15 SOLUTION TOPICAL EVERY 12 HOURS
Refills: 0 | Status: DISCONTINUED | OUTPATIENT
Start: 2020-05-09 | End: 2020-05-09

## 2020-05-09 RX ORDER — CHLORHEXIDINE GLUCONATE 213 G/1000ML
15 SOLUTION TOPICAL EVERY 12 HOURS
Refills: 0 | Status: DISCONTINUED | OUTPATIENT
Start: 2020-05-09 | End: 2020-05-11

## 2020-05-09 RX ORDER — LEVETIRACETAM 250 MG/1
500 TABLET, FILM COATED ORAL EVERY 12 HOURS
Refills: 0 | Status: DISCONTINUED | OUTPATIENT
Start: 2020-05-09 | End: 2020-05-11

## 2020-05-09 RX ORDER — PANTOPRAZOLE SODIUM 20 MG/1
40 TABLET, DELAYED RELEASE ORAL DAILY
Refills: 0 | Status: DISCONTINUED | OUTPATIENT
Start: 2020-05-09 | End: 2020-05-11

## 2020-05-09 RX ORDER — CEFAZOLIN SODIUM 1 G
2000 VIAL (EA) INJECTION EVERY 8 HOURS
Refills: 0 | Status: COMPLETED | OUTPATIENT
Start: 2020-05-09 | End: 2020-05-10

## 2020-05-09 RX ORDER — POTASSIUM CHLORIDE 20 MEQ
10 PACKET (EA) ORAL
Refills: 0 | Status: COMPLETED | OUTPATIENT
Start: 2020-05-09 | End: 2020-05-10

## 2020-05-09 RX ADMIN — NICARDIPINE HYDROCHLORIDE 25 MG/HR: 30 CAPSULE, EXTENDED RELEASE ORAL at 21:59

## 2020-05-09 RX ADMIN — DEXTROSE MONOHYDRATE, SODIUM CHLORIDE, AND POTASSIUM CHLORIDE 75 MILLILITER(S): 50; .745; 4.5 INJECTION, SOLUTION INTRAVENOUS at 21:58

## 2020-05-09 RX ADMIN — PROPOFOL 20 MILLIGRAM(S): 10 INJECTION, EMULSION INTRAVENOUS at 13:03

## 2020-05-09 RX ADMIN — Medication 4 MILLIGRAM(S): at 23:55

## 2020-05-09 RX ADMIN — Medication 100 MILLIEQUIVALENT(S): at 14:30

## 2020-05-09 RX ADMIN — Medication 100 MILLIEQUIVALENT(S): at 23:55

## 2020-05-09 RX ADMIN — DEXMEDETOMIDINE HYDROCHLORIDE IN 0.9% SODIUM CHLORIDE 4.11 MICROGRAM(S)/KG/HR: 4 INJECTION INTRAVENOUS at 21:59

## 2020-05-09 RX ADMIN — Medication 100 MILLIEQUIVALENT(S): at 14:51

## 2020-05-09 RX ADMIN — Medication 100 MILLIEQUIVALENT(S): at 22:50

## 2020-05-09 RX ADMIN — CHLORHEXIDINE GLUCONATE 1 APPLICATION(S): 213 SOLUTION TOPICAL at 21:57

## 2020-05-09 RX ADMIN — LEVETIRACETAM 400 MILLIGRAM(S): 250 TABLET, FILM COATED ORAL at 22:50

## 2020-05-09 RX ADMIN — PROPOFOL 14.9 MICROGRAM(S)/KG/MIN: 10 INJECTION, EMULSION INTRAVENOUS at 14:52

## 2020-05-09 RX ADMIN — Medication 100 MILLIGRAM(S): at 21:57

## 2020-05-09 NOTE — CONSULT NOTE ADULT - SUBJECTIVE AND OBJECTIVE BOX
INCOMPLETE NOTE     87F w/ PMH of HTN, HLD, transferred from [facility] for NSG eval after found to have large BG IPH.     Patient found to have severe headache with subsequent syncopal episode. Found to be minimally responsive and so intubated for airway protection. Was HTN 170s, started on Caredene and prop gtt. Cardene discontinue prior to transfer.     CTH   MR brain 3.18.2013: L. BG lacunar infarcts. Microvascular disease. No microhemorrhages noted on SWI.   MRA h/n 3.18.2013: Negative    EXAM:   Gen: Intubated on propofol  MS: Eyes closed 87F w/ PMH of HTN, HLD, transferred from [facility] for NSG eval after found to have large L. BG IPH.     Patient found to have severe headache with subsequent syncopal episode. Found to be minimally responsive and so intubated for airway protection. Was HTN 170s, started on Caredene and prop gtt. Cardene discontinue prior to transfer.     CTH 5.9.2020: Larage IPH involving the L. BG, frontal lobe, medial temporal lobe w/ near complete effacement of the L. lateral ventricle w/ 0.4cm of righward midline shift.   MR brain 3.18.2013: L. BG lacunar infarcts. Microvascular disease. No microhemorrhages noted on SWI.   MRA h/n 3.18.2013: Negative    EXAM:   COULD NOT EXAMINE PATIENT AS HE WAS IN THE OPERATING ROOM FOR EMERGENT SURGERY 87F w/ PMH of HTN, HLD, transferred from [facility] for NSG eval after found to have large L. BG IPH.     Patient found to have severe headache with subsequent syncopal episode. Found to be minimally responsive and so intubated for airway protection. Was HTN 170s, started on Caredene and prop gtt. Cardene discontinue prior to transfer.     CT 5.9.2020: Larage IPH involving the L. BG, frontal lobe, medial temporal lobe w/ near complete effacement of the L. lateral ventricle w/ 0.4cm of righward midline shift.   MR brain 3.18.2013: L. BG lacunar infarcts. Microvascular disease. No microhemorrhages noted on SWI.   MRA h/n 3.18.2013: Negative    EXAM:   COULD NOT EXAMINE PATIENT AS HE WAS IN THE OPERATING ROOM FOR EMERGENT SURGERY     05-09 Na 139  05-09 K 3.2<L>  05-09 P --  05-09 Mg --  05-09 Ca 8.9  05-09 Cr 0.47<L>

## 2020-05-09 NOTE — CHART NOTE - NSCHARTNOTEFT_GEN_A_CORE
CAPRINI SCORE [CLOT] Score on Admission for     AGE RELATED RISK FACTORS                                                       MOBILITY RELATED FACTORS  [ ] Age 41-60 years                                            (1 Point)                  [ ] Bed rest                                                        (1 Point)  [ ] Age: 61-74 years                                           (2 Points)                 [ ] Plaster cast                                                   (2 Points)  [x ] Age= 75 years                                              (3 Points)                 [ ] Bed bound for more than 72 hours                 (2 Points)    DISEASE RELATED RISK FACTORS                                               GENDER SPECIFIC FACTORS  [ ] Edema in the lower extremities                       (1 Point)                  [ ] Pregnancy                                                     (1 Point)  [ ] Varicose veins                                               (1 Point)                  [ ] Post-partum < 6 weeks                                   (1 Point)             [ ] BMI > 25 Kg/m2                                            (1 Point)                  [ ] Hormonal therapy  or oral contraception          (1 Point)                 [ ] Sepsis (in the previous month)                        (1 Point)                  [ ] History of pregnancy complications                 (1 point)  [ ] Pneumonia or serious lung disease                                               [ ] Unexplained or recurrent                     (1 Point)           (in the previous month)                               (1 Point)  [ ] Abnormal pulmonary function test                     (1 Point)                 SURGERY RELATED RISK FACTORS (include planned surgeries)  [ ] Acute myocardial infarction                              (1 Point)                 [ ]  Section                                             (1 Point)  [ ] Congestive heart failure (in the previous month)  (1 Point)         [ ] Minor surgery                                                  (1 Point)   [ ] Inflammatory bowel disease                             (1 Point)                 [ ] Arthroscopic surgery                                        (2 Points)  [ ] Central venous access                                      (2 Points)                [ x] General surgery lasting more than 45 minutes   (2 Points)       [ ] Stroke (in the previous month)                          (5 Points)               [ ] Elective arthroplasty                                         (5 Points)            [ ] current or past malignancy                              (2 Points)                                                                                                       HEMATOLOGY RELATED FACTORS                                                 TRAUMA RELATED RISK FACTORS  [ ] Prior episodes of VTE                                     (3 Points)                [ ] Fracture of the hip, pelvis, or leg                       (5 Points)  [ ] Positive family history for VTE                         (3 Points)                 [ ] Acute spinal cord injury (in the previous month)  (5 Points)  [ ] Prothrombin 83153 A                                     (3 Points)                 [ ] Paralysis  (less than 1 month)                             (5 Points)  [ ] Factor V Leiden                                             (3 Points)                  [ ] Multiple Trauma within 1 month                        (5 Points)  [ ] Lupus anticoagulants                                     (3 Points)                                                           [ ] Anticardiolipin antibodies                               (3 Points)                                                       [ ] High homocysteine in the blood                      (3 Points)                                             [ ] Other congenital or acquired thrombophilia      (3 Points)                                                [ ] Heparin induced thrombocytopenia                  (3 Points)                                          Total Score [      5    ]    Risk:  Very low 0   Low 1 to 2   Moderate 3 to 4   High =5       VTE Prophylasix Recommednations:  [ x] mechanical pneumatic compression devices                                      [ ] contraindicated: _____________________  [ ] chemo prophylasix                                                                                   [x ] contraindicated  bleeding risk     **** HIGH LIKELIHOOD DVT PRESENT ON ADMISSION  [ ] (please order LE dopplers within 24 hours of admission)

## 2020-05-09 NOTE — H&P ADULT - HISTORY OF PRESENT ILLNESS
87F w/ PMH of HTN, HLD, transferred from [facility] for NSG eval after found to have large L. BG IPH.     Patient found to have severe headache with subsequent syncopal episode. Found to be minimally responsive and so intubated for airway protection. Was HTN 170s, started on Caredene and prop gtt. Cardene discontinue prior to transfer. Upon arrival, with sedation held she was unable to open her eyes, not following commands, moving spontaneously on the L, RUE loc, RLE WDs. After extensive discussion with the family regarding goals of care, she was brought to the operating room for emergent clot evacuation.

## 2020-05-09 NOTE — ED PROVIDER NOTE - PHYSICAL EXAMINATION
GEN: intubated/sedated   HEENT: NCAT, MMM, normal conjunctiva, perrl  CHEST/LUNGS: Non-tachypneic, CTAB, bilateral breath sounds  CARDIAC: Non-tachycardic, s1s2, normal perfusion, no peripheral edema  ABDOMEN: Soft, NTND, No rebound/guarding  MSK: No joint tenderness, no gross deformity of extremities  SKIN: No rashes, no petechiae, no vesicles  NEURO: sedated but noted to move left arm/legs to noxious stimuli, no movement to right side of body noted

## 2020-05-09 NOTE — ED PROVIDER NOTE - OBJECTIVE STATEMENT
87F with pmh HTN, HLD presenting transfer from [facility] for neurosurgery eval, found to have large basal ganglia bleed with midline shift. Patient noted to have severe headache with subsequent syncopal episode. Found minimally responsive at OSH and intubated for airway protection. Hypertensive to 170s, started on Cardene gtt and propofol gtt. Cardene discontinued prior to transfer. Noted to only be moving left side of body per EMS. No report of blood thinners.

## 2020-05-09 NOTE — ED ADULT NURSE NOTE - NSIMPLEMENTINTERV_GEN_ALL_ED
Implemented All Fall Risk Interventions:  [place] to call system. Call bell, personal items and telephone within reach. Instruct patient to call for assistance. Room bathroom lighting operational. Non-slip footwear when patient is off stretcher. Physically safe environment: no spills, clutter or unnecessary equipment. Stretcher in lowest position, wheels locked, appropriate side rails in place. Provide visual cue, wrist band, yellow gown, etc. Monitor gait and stability. Monitor for mental status changes and reorient to person, place, and time. Review medications for side effects contributing to fall risk. Reinforce activity limits and safety measures with patient and family.

## 2020-05-09 NOTE — ED PROVIDER NOTE - CRITICAL CARE PROVIDED
direct patient care (not related to procedure)/interpretation of diagnostic studies/consultation with other physicians/consult w/ pt's family directly relating to pts condition/additional history taking/documentation

## 2020-05-09 NOTE — ED PROVIDER NOTE - MDM PATIENT STATEMENT FOR ADDL TREATMENT
Portable x-ray bedside   Patient with one or more new problems requiring additional work-up/treatment.

## 2020-05-09 NOTE — H&P ADULT - ASSESSMENT
84F with large R BG hemorrhage, brought emergently to OR for endoscopic clot evacuation.   - SBP<140  - q. 1 hour neuro checks   - CTH in AM  - MRI at some point (stroke w/u)  - Neurology following  - Keppra [id number]

## 2020-05-09 NOTE — ED PROVIDER NOTE - CLINICAL SUMMARY MEDICAL DECISION MAKING FREE TEXT BOX
Patient presenting txf for intraparenchymal hemorrhage. Intubated and sedated prior to transfer. Neurosurgery to be consulted.

## 2020-05-09 NOTE — ED ADULT NURSE NOTE - OBJECTIVE STATEMENT
84 y F history of HTN presents as transfer from [place] for neuro evaluation. Pt. was aphasic this morning with right sided weakness. CT showed large left sided basal ganglia bleed with midline shift. Pt. presents intubated from [place]; hypertensive in 190s- started on Cardene which was discontinued and changed to Propofol. A&Ox0- responds to pain; moving all extremities. Pupils 3 mm nonreactive to light. Patient ventilated at 16 breaths/ min, tidal volume 450, PEEP 5. Abdomen soft nondistended. 16 Fr. Indwelling urinary catheter in place from [place] draining clear yellow urine. 18G left AC and 20 G right AC placed from [place]. Safety maintained- bed locked in lowest position. 84 y F history of HTN presents as transfer from [place] for neuro evaluation. Pt. experiencing global aphasia this morning with right sided weakness. CT showed large left sided basal ganglia bleed with midline shift. Pt. presents intubated from [place]; hypertensive in 190s- started on Cardene which was discontinued at [place] and Propofol started. A&Ox0- withdrawals to pain; moving all extremities. Pupils 3 mm nonreactive to light. Skin warm and dry- stage 1 pressure injury on right buttock present on arrival.  Patient ventilated at 16 breaths/ min, tidal volume 450, PEEP 5. Abdomen soft nondistended. 16 Fr. Indwelling urinary catheter in place from [place] draining clear yellow urine. 18G left AC and 20 G right AC placed from [place]. Safety maintained- bed locked in lowest position.

## 2020-05-09 NOTE — ED ADULT NURSE REASSESSMENT NOTE - NS ED NURSE REASSESS COMMENT FT1
Neurosurgery team at bedside; will be going to OR. Report called and given- awaiting transport. VSS. Neurosurgery team at bedside; will be going to OR. Report called and given- awaiting transport. Potassium started with SCAR Stein. KIARS.

## 2020-05-09 NOTE — CONSULT NOTE ADULT - ASSESSMENT
87F w/ PMH of HTN, HLD, transferred from [facility] for NSG eval after found to have large BG IPH.     Impression: ** 2/2 subcortical IPH likely secondary to HTN.     Plan:   [] MRI brain w/w/o contrast to rule out underlying vascular or mass lesion   [] MRA head w/o contrast; MRA neck w/ contrast  [] SBP goal <160; Consider Cardene gtt if needed   [] If on antiplatelet therapy for 2ndary stroke prevention, hold for at least 48 hours. Decision to resume antiplatelet therapy to be made my stroke attending and will depend on serial scans.   [] Continue stat for secondary stroke prevention (his previous chronic lacunes)    [] Mechanical DVT ppx. Can start pharmacological DVT ppx in 48 hours if serial scans stable. 87F w/ PMH of HTN, HLD, transferred from [facility] for NSG eval after found to have large BG IPH.     Impression: Large L. Cortical/subcortical hemorrhage likely secondary to HTN vs. Cerebral Amyloid Angiopathy vs. underlying vascular lesion.     Plan:   [] Intervention as per NSG   [] MRI brain w/w/o contrast to rule out underlying vascular or mass lesion   [] MRA head w/o contrast; MRA neck w/ contrast  [] SBP goal <160; Consider Cardene gtt if needed   [] Hold all antiplatelets   [] Continue statin for secondary stroke prevention (has previous chronic lacunes)    [] Mechanical DVT ppx. Can start pharmacological DVT ppx in 48 hours if serial scans stable.

## 2020-05-09 NOTE — ED PROVIDER NOTE - ATTENDING CONTRIBUTION TO CARE
Attending MD Viramontes:  I personally have seen and examined this patient.  Resident note reviewed and agree on plan of care and except where noted.  See HPI, PE, and MDM for details.    **THIS PATIENT WAS EVALUATED AND TREATED DURING THE COVID-19 PANDEMIC IN [place], [place]**      87F transferred from OSH for spontaneous IPH, patient arrives intubated and sedated on propfol, does withdraw to pain on left, no known full AC. Plan to continue propofol, HOB elevated, SBPs acceptable in 120s systolic at this time. Plan for interval CT head, neurosurgery consultation, ICU admission.

## 2020-05-10 LAB
A1C WITH ESTIMATED AVERAGE GLUCOSE RESULT: 5.5 % — SIGNIFICANT CHANGE UP (ref 4–5.6)
ANION GAP SERPL CALC-SCNC: 13 MMOL/L — SIGNIFICANT CHANGE UP (ref 5–17)
BUN SERPL-MCNC: 26 MG/DL — HIGH (ref 7–23)
CALCIUM SERPL-MCNC: 8.7 MG/DL — SIGNIFICANT CHANGE UP (ref 8.4–10.5)
CHLORIDE SERPL-SCNC: 109 MMOL/L — HIGH (ref 96–108)
CHOLEST SERPL-MCNC: 165 MG/DL — SIGNIFICANT CHANGE UP (ref 10–199)
CO2 SERPL-SCNC: 20 MMOL/L — LOW (ref 22–31)
CREAT SERPL-MCNC: 0.67 MG/DL — SIGNIFICANT CHANGE UP (ref 0.5–1.3)
ESTIMATED AVERAGE GLUCOSE: 111 MG/DL — SIGNIFICANT CHANGE UP (ref 68–114)
GAS PNL BLDA: SIGNIFICANT CHANGE UP
GLUCOSE SERPL-MCNC: 148 MG/DL — HIGH (ref 70–99)
HCT VFR BLD CALC: 32.1 % — LOW (ref 34.5–45)
HDLC SERPL-MCNC: 50 MG/DL — SIGNIFICANT CHANGE UP
HGB BLD-MCNC: 11.1 G/DL — LOW (ref 11.5–15.5)
LIPID PNL WITH DIRECT LDL SERPL: 100 MG/DL — SIGNIFICANT CHANGE UP
MAGNESIUM SERPL-MCNC: 2 MG/DL — SIGNIFICANT CHANGE UP (ref 1.6–2.6)
MCHC RBC-ENTMCNC: 33.1 PG — SIGNIFICANT CHANGE UP (ref 27–34)
MCHC RBC-ENTMCNC: 34.6 GM/DL — SIGNIFICANT CHANGE UP (ref 32–36)
MCV RBC AUTO: 95.8 FL — SIGNIFICANT CHANGE UP (ref 80–100)
NRBC # BLD: 0 /100 WBCS — SIGNIFICANT CHANGE UP (ref 0–0)
PHOSPHATE SERPL-MCNC: 3.2 MG/DL — SIGNIFICANT CHANGE UP (ref 2.5–4.5)
PLATELET # BLD AUTO: 148 K/UL — LOW (ref 150–400)
POTASSIUM SERPL-MCNC: 4.1 MMOL/L — SIGNIFICANT CHANGE UP (ref 3.5–5.3)
POTASSIUM SERPL-SCNC: 4.1 MMOL/L — SIGNIFICANT CHANGE UP (ref 3.5–5.3)
RBC # BLD: 3.35 M/UL — LOW (ref 3.8–5.2)
RBC # FLD: 13.6 % — SIGNIFICANT CHANGE UP (ref 10.3–14.5)
SODIUM SERPL-SCNC: 142 MMOL/L — SIGNIFICANT CHANGE UP (ref 135–145)
T3 SERPL-MCNC: 110 NG/DL — SIGNIFICANT CHANGE UP (ref 80–200)
T4 AB SER-ACNC: 6.6 UG/DL — SIGNIFICANT CHANGE UP (ref 4.6–12)
TOTAL CHOLESTEROL/HDL RATIO MEASUREMENT: 3.3 RATIO — SIGNIFICANT CHANGE UP (ref 3.3–7.1)
TRIGL SERPL-MCNC: 76 MG/DL — SIGNIFICANT CHANGE UP (ref 10–149)
TSH SERPL-MCNC: 3.31 UIU/ML — SIGNIFICANT CHANGE UP (ref 0.27–4.2)
WBC # BLD: 10.53 K/UL — HIGH (ref 3.8–10.5)
WBC # FLD AUTO: 10.53 K/UL — HIGH (ref 3.8–10.5)

## 2020-05-10 PROCEDURE — 99291 CRITICAL CARE FIRST HOUR: CPT

## 2020-05-10 PROCEDURE — 70450 CT HEAD/BRAIN W/O DYE: CPT | Mod: 26

## 2020-05-10 PROCEDURE — 93970 EXTREMITY STUDY: CPT | Mod: 26

## 2020-05-10 PROCEDURE — 71045 X-RAY EXAM CHEST 1 VIEW: CPT | Mod: 26

## 2020-05-10 PROCEDURE — 99292 CRITICAL CARE ADDL 30 MIN: CPT

## 2020-05-10 RX ORDER — LOSARTAN POTASSIUM 100 MG/1
100 TABLET, FILM COATED ORAL DAILY
Refills: 0 | Status: DISCONTINUED | OUTPATIENT
Start: 2020-05-10 | End: 2020-05-15

## 2020-05-10 RX ORDER — FENTANYL CITRATE 50 UG/ML
25 INJECTION INTRAVENOUS
Refills: 0 | Status: DISCONTINUED | OUTPATIENT
Start: 2020-05-10 | End: 2020-05-11

## 2020-05-10 RX ADMIN — SENNA PLUS 2 TABLET(S): 8.6 TABLET ORAL at 21:31

## 2020-05-10 RX ADMIN — CHLORHEXIDINE GLUCONATE 1 APPLICATION(S): 213 SOLUTION TOPICAL at 21:33

## 2020-05-10 RX ADMIN — Medication 100 MILLIGRAM(S): at 13:10

## 2020-05-10 RX ADMIN — Medication 4 MILLIGRAM(S): at 23:46

## 2020-05-10 RX ADMIN — PANTOPRAZOLE SODIUM 40 MILLIGRAM(S): 20 TABLET, DELAYED RELEASE ORAL at 11:19

## 2020-05-10 RX ADMIN — Medication 100 MILLIEQUIVALENT(S): at 02:00

## 2020-05-10 RX ADMIN — POLYETHYLENE GLYCOL 3350 17 GRAM(S): 17 POWDER, FOR SOLUTION ORAL at 05:12

## 2020-05-10 RX ADMIN — Medication 100 MILLIGRAM(S): at 05:13

## 2020-05-10 RX ADMIN — Medication 4 MILLIGRAM(S): at 11:19

## 2020-05-10 RX ADMIN — LOSARTAN POTASSIUM 100 MILLIGRAM(S): 100 TABLET, FILM COATED ORAL at 05:15

## 2020-05-10 RX ADMIN — LEVETIRACETAM 400 MILLIGRAM(S): 250 TABLET, FILM COATED ORAL at 21:31

## 2020-05-10 RX ADMIN — CHLORHEXIDINE GLUCONATE 15 MILLILITER(S): 213 SOLUTION TOPICAL at 18:30

## 2020-05-10 RX ADMIN — Medication 4 MILLIGRAM(S): at 18:30

## 2020-05-10 RX ADMIN — CHLORHEXIDINE GLUCONATE 15 MILLILITER(S): 213 SOLUTION TOPICAL at 05:14

## 2020-05-10 RX ADMIN — Medication 4 MILLIGRAM(S): at 05:14

## 2020-05-10 RX ADMIN — POLYETHYLENE GLYCOL 3350 17 GRAM(S): 17 POWDER, FOR SOLUTION ORAL at 16:41

## 2020-05-10 RX ADMIN — LEVETIRACETAM 400 MILLIGRAM(S): 250 TABLET, FILM COATED ORAL at 10:42

## 2020-05-10 NOTE — OCCUPATIONAL THERAPY INITIAL EVALUATION ADULT - ADL RETRAINING, OT EVAL
Goal: Pt will perform UB dressing with supervision within 4 weeks. Pt will perform LB dressing with minimal assist within 4 weeks.

## 2020-05-10 NOTE — OCCUPATIONAL THERAPY INITIAL EVALUATION ADULT - COGNITIVE, VISUAL PERCEPTUAL, OT EVAL
Goal: Pt will follow 100% simple commands within 4 weeks to improve Activities of Daily Living participation.

## 2020-05-10 NOTE — OCCUPATIONAL THERAPY INITIAL EVALUATION ADULT - PHYSICAL ASSIST/NONPHYSICAL ASSIST: SCOOT/BRIDGE, REHAB EVAL
TRANSFER - OUT REPORT:    Verbal report given to Vinnie Armendariz on [facility]  being transferred to  for routine progression of care       Report consisted of patients Situation, Background, Assessment and   Recommendations(SBAR). Information from the following report(s) SBAR was reviewed with the receiving nurse. Lines:   Peripheral IV 02/20/17 Left Forearm (Active)       Peripheral IV 02/20/17 Right Antecubital (Active)        Opportunity for questions and clarification was provided.       Patient transported with:   O2 @ 5 liters   Droplet Precautions
2 person assist/verbal cues

## 2020-05-10 NOTE — OCCUPATIONAL THERAPY INITIAL EVALUATION ADULT - PERTINENT HX OF CURRENT PROBLEM, REHAB EVAL
85 y/o female PMH of HTN, HLD who presented with headache and syncopal episode. {Patient was found to have LT. basal ganglia hemorrhage. Patient is s/p Priyanka for evacuation of Left intracerebral hemorrhage on 5/9/20. 83 y/o female PMH of HTN, HLD who presented with headache and syncopal episode. Patient was found to have LT. basal ganglia hemorrhage. Patient is s/p Priyanka for evacuation of Left intracerebral hemorrhage on 5/9/20.

## 2020-05-10 NOTE — PROGRESS NOTE ADULT - ASSESSMENT
HPI:  87F w/ PMH of HTN, HLD, transferred from [facility] for NSG eval after found to have large L. BG IPH.     Patient found to have severe headache with subsequent syncopal episode. Found to be minimally responsive and so intubated for airway protection. Was HTN 170s, started on Caredene and prop gtt. Cardene discontinue prior to transfer. Upon arrival, with sedation held she was unable to open her eyes, not following commands, moving spontaneously on the L, RUE loc, RLE WDs. After extensive discussion with the family regarding goals of care, she was brought to the operating room for emergent clot evacuation. (09 May 2020 20:05)    PAST MEDICAL & SURGICAL HISTORY:  HTN (hypertension)    PROCEDURE: Left Priyanka for evacuation of intracerebral hemorrhage, POD#0    PLAN:  Neuro:   s/p Priyanka   Neuro checks q 1 hour  off sedation, Precedex PRN   Decadron 4 q6   Keppra 500 BID  CT Head in am     Respiratory:   Intubated  maintain SpO2 > 92%     CV:  -140   Nicardipine   Losartan 100mg daily   TTE-p     GI:  NPO         senna, miralax        Protonix         Simvastatin    Renal: NS+K @ 75     Endocrine: Euglycemia                    A1C 5.5     Heme/Onc:               DVT ppx: [] SQH [] SQL [ X] venodynes bilaterally  Lower extremity dopplers pending to rule out DVT on admission    ID: Afebrile       PT/OT: pending HPI:  87F w/ PMH of HTN, HLD, transferred from [facility] for NSG eval after found to have large L. BG IPH.     Patient found to have severe headache with subsequent syncopal episode. Found to be minimally responsive and so intubated for airway protection. Was HTN 170s, started on Caredene and prop gtt. Cardene discontinue prior to transfer. Upon arrival, with sedation held she was unable to open her eyes, not following commands, moving spontaneously on the L, RUE loc, RLE WDs. After extensive discussion with the family regarding goals of care, she was brought to the operating room for emergent clot evacuation. (09 May 2020 20:05)    PAST MEDICAL & SURGICAL HISTORY:  HTN (hypertension)    PROCEDURE: Left Priyanka for evacuation of intracerebral hemorrhage, POD#0    PLAN:  Neuro:   s/p Priyanka   Neuro checks q 1 hour  off sedation, Precedex PRN   Decadron 4 q6   Keppra 500 BID  CT Head in am     Respiratory:   Intubated  maintain SpO2 > 92%     CV:  -140   Nicardipine   Losartan 100mg daily   TTE-p     GI:  NPO         senna, miralax        Protonix         Simvastatin    Renal: NS+K @ 75     Endocrine: Euglycemia                    A1C 5.5     Heme/Onc:               DVT ppx: [] SQH [] SQL [ X] venodynes bilaterally  Lower extremity dopplers pending to rule out DVT on admission    ID: Afebrile     PT/OT: pending       CODE STATUS:  [x] Full Code     DISPOSITION:  [x] ICU   [x] Patient is at high risk of neurologic deterioration/death due to: Large left basal ganglia hemorrhage      Total critical care time 35 minutes

## 2020-05-10 NOTE — PROGRESS NOTE ADULT - SUBJECTIVE AND OBJECTIVE BOX
Patient Seen and Examined.     Overnight Events: None, POD1 s/p L endoscopic evacuation of IPH     T(C): 36.1 (05-09-20 @ 20:00), Max: 36.1 (05-09-20 @ 19:40)  HR: 73 (05-10-20 @ 01:15) (57 - 75)  BP: 126/60 (05-10-20 @ 01:00) (103/55 - 145/71)  RR: 18 (05-10-20 @ 01:00) (11 - 18)  SpO2: 100% (05-10-20 @ 01:15) (99% - 100%)    Exam:   Intubated, Opens eyes to voice, PERRL, Face equal, tongue m/l   LUE squeezes hand, but will not show 2 fingers, LLE moves spontaneously antigravity  RUE trace movement, extensor vs.WD, RLE WDs     acetaminophen   Tablet .. 650 milliGRAM(s) Oral every 6 hours PRN  ceFAZolin   IVPB 2000 milliGRAM(s) IV Intermittent every 8 hours  chlorhexidine 0.12% Liquid 15 milliLiter(s) Oral Mucosa every 12 hours  chlorhexidine 4% Liquid 1 Application(s) Topical <User Schedule>  dexAMETHasone  Injectable 4 milliGRAM(s) IV Push every 6 hours  dexAMETHasone  Injectable 4 milliGRAM(s) IV Push every 6 hours  dexMEDEtomidine Infusion 0.2 MICROgram(s)/kG/Hr IV Continuous <Continuous>  levETIRAcetam 500 milliGRAM(s) Oral every 12 hours  levETIRAcetam  IVPB 500 milliGRAM(s) IV Intermittent every 12 hours  niCARdipine Infusion 5 mG/Hr IV Continuous <Continuous>  niCARdipine Infusion 5 mG/Hr IV Continuous <Continuous>  ondansetron Injectable 4 milliGRAM(s) IV Push every 6 hours PRN  pantoprazole  Injectable 40 milliGRAM(s) IV Push daily  polyethylene glycol 3350 17 Gram(s) Oral every 12 hours  potassium chloride  10 mEq/100 mL IVPB 10 milliEquivalent(s) IV Intermittent every 1 hour  senna 2 Tablet(s) Oral at bedtime  sodium chloride 0.9% with potassium chloride 20 mEq/L 1000 milliLiter(s) IV Continuous <Continuous>                            12.7   7.21  )-----------( 182      ( 09 May 2020 21:12 )             37.4     05-09    140  |  104  |  21  ----------------------------<  144<H>  3.5   |  22  |  0.54    Ca    9.2      09 May 2020 21:12  Phos  4.5     05-09  Mg     1.9     05-09    TPro  6.8  /  Alb  4.1  /  TBili  0.4  /  DBili  x   /  AST  23  /  ALT  12  /  AlkPhos  50  05-09    PT/INR - ( 09 May 2020 13:52 )   PT: 10.7 sec;   INR: 0.94 ratio         PTT - ( 09 May 2020 13:52 )  PTT:28.7 sec    Imaging:   CTH: s/p evacuation of clot, no new hemorrhage, improved MLS

## 2020-05-10 NOTE — OCCUPATIONAL THERAPY INITIAL EVALUATION ADULT - STRENGTHENING, PT EVAL
GOAL: Pt will increase BUE/BLE strength to 3+/5 to increase participation in functional tasks in 4 weeks

## 2020-05-10 NOTE — PHYSICAL THERAPY INITIAL EVALUATION ADULT - PERTINENT HX OF CURRENT PROBLEM, REHAB EVAL
Pt is a 87F w/ PMH of HTN, HLD, transferred from [facility] for NSG eval after found to have large L. BG IPH. Patient found to have severe headache with subsequent syncopal episode. Found to be minimally responsive and so intubated for airway protection. Now s/p Priyanka evacuation of IPH on 5/9.

## 2020-05-10 NOTE — SPEECH LANGUAGE PATHOLOGY EVALUATION - COMMENTS
CT 5.9.2020: Larage IPH involving the L. BG, frontal lobe, medial temporal lobe w/ near complete effacement of the L. lateral ventricle w/ 0.4cm of righward midline shift.   MR brain 3.18.2013: L. BG lacunar infarcts. Microvascular disease. No microhemorrhages noted on SWI.   MRA h/n 3.18.2013: Negative

## 2020-05-10 NOTE — PROGRESS NOTE ADULT - SUBJECTIVE AND OBJECTIVE BOX
HPI:  87F w/ PMH of HTN, HLD, transferred from [facility] for NSG eval after found to have large L. BG IPH.     Patient found to have severe headache with subsequent syncopal episode. Found to be minimally responsive and so intubated for airway protection. Was HTN 170s, started on Caredene and prop gtt. Cardene discontinue prior to transfer. Upon arrival, with sedation held she was unable to open her eyes, not following commands, moving spontaneously on the L, RUE loc, RLE WDs. After extensive discussion with the family regarding goals of care, she was brought to the operating room for emergent clot evacuation. (09 May 2020 20:05)    OVERNIGHT EVENTS:  - admitted to PACU s/p Lt larry of evacuation of ICH    Vital Signs Last 24 Hrs  T(C): 36.1 (10 May 2020 09:30), Max: 37.1 (10 May 2020 07:30)  T(F): 97 (10 May 2020 09:30), Max: 98.8 (10 May 2020 07:30)  HR: 81 (10 May 2020 12:00) (57 - 103)  BP: 123/60 (10 May 2020 10:45) (100/53 - 145/71)  BP(mean): 86 (10 May 2020 10:45) (75 - 94)  RR: 16 (10 May 2020 12:00) (11 - 18)  SpO2: 100% (10 May 2020 12:00) (99% - 100%)    I&O's Detail    09 May 2020 07:01  -  10 May 2020 07:00  --------------------------------------------------------  IN:    IV PiggyBack: 400 mL    niCARdipine Infusion: 20 mL    niCARdipine Infusion: 80 mL    sodium chloride 0.9% with potassium chloride 20 mEq/L: 825 mL  Total IN: 1325 mL    OUT:    Indwelling Catheter - Urethral: 445 mL  Total OUT: 445 mL    Total NET: 880 mL      10 May 2020 07:01  -  10 May 2020 12:25  --------------------------------------------------------  IN:    IV PiggyBack: 100 mL    niCARdipine Infusion: 50 mL    sodium chloride 0.9% with potassium chloride 20 mEq/L: 375 mL  Total IN: 525 mL    OUT:    Indwelling Catheter - Urethral: 680 mL  Total OUT: 680 mL    Total NET: -155 mL        I&O's Summary    09 May 2020 07:01  -  10 May 2020 07:00  --------------------------------------------------------  IN: 1325 mL / OUT: 445 mL / NET: 880 mL    10 May 2020 07:01  -  10 May 2020 12:25  --------------------------------------------------------  IN: 525 mL / OUT: 680 mL / NET: -155 mL        PHYSICAL EXAM:  Neurological:  intubated, EO to noxious stimuli, pupils 2mm reactive, not FC, LUE loc, LLE wds, RUE ext, RLE wds  Cardiovascular: +s1, s2  Respiratory: clear to auscultation b/l  Gastrointestinal: soft, non-distended, non-tender  Extremities: warm, dry  Incision/Wound: dressing C/D/I    LABS:                        12.7   7.21  )-----------( 182      ( 09 May 2020 21:12 )             37.4     05-09    140  |  104  |  21  ----------------------------<  144<H>  3.5   |  22  |  0.54    Ca    9.2      09 May 2020 21:12  Phos  4.5     05-09  Mg     1.9     05-09    TPro  6.8  /  Alb  4.1  /  TBili  0.4  /  DBili  x   /  AST  23  /  ALT  12  /  AlkPhos  50  05-09    PT/INR - ( 09 May 2020 13:52 )   PT: 10.7 sec;   INR: 0.94 ratio         PTT - ( 09 May 2020 13:52 )  PTT:28.7 sec        CAPILLARY BLOOD GLUCOSE          Drug Levels: [] N/A    CSF Analysis: [] N/A      Allergies    No Known Allergies    Intolerances      MEDICATIONS:  Antibiotics:  ceFAZolin   IVPB 2000 milliGRAM(s) IV Intermittent every 8 hours    Neuro:  acetaminophen   Tablet .. 650 milliGRAM(s) Oral every 6 hours PRN  dexMEDEtomidine Infusion 0.2 MICROgram(s)/kG/Hr IV Continuous <Continuous>  levETIRAcetam  IVPB 500 milliGRAM(s) IV Intermittent every 12 hours  ondansetron Injectable 4 milliGRAM(s) IV Push every 6 hours PRN    Anticoagulation:    OTHER:  chlorhexidine 0.12% Liquid 15 milliLiter(s) Oral Mucosa every 12 hours  chlorhexidine 4% Liquid 1 Application(s) Topical <User Schedule>  dexAMETHasone  Injectable 4 milliGRAM(s) IV Push every 6 hours  losartan 100 milliGRAM(s) Oral daily  niCARdipine Infusion 5 mG/Hr IV Continuous <Continuous>  pantoprazole  Injectable 40 milliGRAM(s) IV Push daily  polyethylene glycol 3350 17 Gram(s) Oral every 12 hours  senna 2 Tablet(s) Oral at bedtime    IVF:  sodium chloride 0.9% with potassium chloride 20 mEq/L 1000 milliLiter(s) IV Continuous <Continuous>    CULTURES:    RADIOLOGY & ADDITIONAL TESTS:

## 2020-05-10 NOTE — OCCUPATIONAL THERAPY INITIAL EVALUATION ADULT - GENERAL OBSERVATIONS, REHAB EVAL
Pt received supine in bed +orally intubated, +Sinton, +cardiac monitor, +central line, +BLE venodynes and +L wrist restraint.

## 2020-05-10 NOTE — PROGRESS NOTE ADULT - ASSESSMENT
HPI:  87F w/ PMH of HTN, HLD, transferred from [facility] for NSG eval after found to have large L. BG IPH.     Patient found to have severe headache with subsequent syncopal episode. Found to be minimally responsive and so intubated for airway protection. Was HTN 170s, started on Caredene and prop gtt. Cardene discontinue prior to transfer. Upon arrival, with sedation held she was unable to open her eyes, not following commands, moving spontaneously on the L, RUE loc, RLE WDs. After extensive discussion with the family regarding goals of care, she was brought to the operating room for emergent clot evacuation. (09 May 2020 20:05)    PROCEDURE: Left Priyanka of evacuation of ICH     POD#1    PLAN:  Neuro:   Left Basal ganglion heme, ICH score 3, s/p Priyanka   Neuro checks q 1 hour  off sedation, Precedex PRN   CT head this am stable compared to postop CTH overnight  will repeat CTH tomorrow, and start DVT ppx if stable  continue Decadron 4 q6   Keppra 500 BID    Respiratory:   Vent settin/470/5/40%  will titrate vent settings based on ABG  maintain SpO2 > 92%   CPAP as tolerated    CV:  -140   Nicardipine   Losartan 100mg daily   continue Simvastatin  TTE-p     GI:    will start TF with jevity given stable CTH  senna, miralax  GI ppx: Protonix     Renal:   NS+K @ 75 until TF at goal  monitor I&O    Endocrine:   Euglycemia   A1C 5.5     Heme/Onc:               DVT ppx: [] SQH [] SQL [ X] venodynes bilaterally  Lower extremity dopplers pending to rule out DVT on admission    ID: Afebrile     PT/OT: pending       CODE STATUS:  [x] Full Code     DISPOSITION:  [x] ICU   [x] Patient is at high risk of neurologic deterioration/death due to: Large left basal ganglia hemorrhage      Total critical care time 45 minutes

## 2020-05-10 NOTE — PHYSICAL THERAPY INITIAL EVALUATION ADULT - PASSIVE RANGE OF MOTION EXAMINATION, REHAB EVAL
Left UE Passive ROM was WNL (within normal limits)/Left LE Passive ROM was WNL (within normal limits)/Right LE Passive ROM was WNL (within normal limits)/Right UE Passive ROM was WNL (within normal limits)

## 2020-05-10 NOTE — OCCUPATIONAL THERAPY INITIAL EVALUATION ADULT - BED MOBILITY TRAINING, PT EVAL
Goal: Pt will perform bed mobility with supervision in prep for OOB Activities of Daily Living within 4 weeks.

## 2020-05-10 NOTE — SPEECH LANGUAGE PATHOLOGY EVALUATION - SLP DIAGNOSIS
Chart reviewed. Orders received. Pt is s/p Priyanka for evacuation of Left intracerebral hemorrhage on 5/9/20, currently intubated and not appropriate for this evaluation at this time. D/W NSx team (Ysabel).

## 2020-05-10 NOTE — OCCUPATIONAL THERAPY INITIAL EVALUATION ADULT - PLANNED THERAPY INTERVENTIONS, OT EVAL
cognitive, visual perceptual/transfer training/ADL retraining/parent/caregiver training.../bed mobility training strengthening/cognitive, visual perceptual/parent/caregiver training.../ADL retraining/ROM/bed mobility training

## 2020-05-10 NOTE — PHYSICAL THERAPY INITIAL EVALUATION ADULT - PRECAUTIONS/LIMITATIONS, REHAB EVAL
continued from above: CTH 5/9: Large intraparenchymal hemorrhage involving the left basal ganglia, frontal lobe and medial temporal lobe as described above. Near complete effacement of the left lateral ventricle. There is 0.4 cm of rightward midline shift./fall precautions/surgical precautions

## 2020-05-10 NOTE — OCCUPATIONAL THERAPY INITIAL EVALUATION ADULT - LEVEL OF CONSCIOUSNESS, OT EVAL
alert/lethargic/somnolent/fluctuates during session, requires verbal and tactile cues to maintain eyes open

## 2020-05-10 NOTE — PROGRESS NOTE ADULT - SUBJECTIVE AND OBJECTIVE BOX
SUBJECTIVE: 83 y/o female PMH of HTN, HLD who presented with headache and syncopal episode. {Patient was found to have LT. basal ganglia hemorrhage. Patient is s/p Priyanka for evacuation of Left intracerebral hemorrhage on 5/9/20.    OVERNIGHT EVENTS: No acute overnight events.     Vital Signs Last 24 Hrs  T(C): 36.7 (10 May 2020 02:00), Max: 36.7 (10 May 2020 02:00)  T(F): 98.1 (10 May 2020 02:00), Max: 98.1 (10 May 2020 02:00)  HR: 66 (10 May 2020 02:00) (57 - 88)  BP: 126/60 (10 May 2020 01:00) (103/55 - 145/71)  BP(mean): 87 (10 May 2020 01:00) (79 - 90)  RR: 12 (10 May 2020 02:00) (11 - 18)  SpO2: 100% (10 May 2020 02:00) (99% - 100%)    PHYSICAL EXAM:    General: No Acute Distress     Neurological: Intubated, EO to voice, pupils 3R, No FC, RUE 0/5, LUE Loc, b/l  LE WD     Pulmonary: Clear to Auscultation, No Rales, No Rhonchi, No Wheezes     Cardiovascular: S1, S2, Regular Rate and Rhythm     Gastrointestinal: Soft, Nontender, Nondistended       LABS:                        12.7   7.21  )-----------( 182      ( 09 May 2020 21:12 )             37.4    05-09    140  |  104  |  21  ----------------------------<  144<H>  3.5   |  22  |  0.54    Ca    9.2      09 May 2020 21:12  Phos  4.5     05-09  Mg     1.9     05-09    TPro  6.8  /  Alb  4.1  /  TBili  0.4  /  DBili  x   /  AST  23  /  ALT  12  /  AlkPhos  50  05-09  PT/INR - ( 09 May 2020 13:52 )   PT: 10.7 sec;   INR: 0.94 ratio         PTT - ( 09 May 2020 13:52 )  PTT:28.7 sec      05-09 @ 07:01  -  05-10 @ 02:17  --------------------------------------------------------  IN: 395 mL / OUT: 140 mL / NET: 255 mL      DRAINS:     MEDICATIONS:  Antibiotics:  ceFAZolin   IVPB 2000 milliGRAM(s) IV Intermittent every 8 hours    Neuro:  acetaminophen   Tablet .. 650 milliGRAM(s) Oral every 6 hours PRN Temp greater or equal to 38C (100.4F), Mild Pain (1 - 3)  dexMEDEtomidine Infusion 0.2 MICROgram(s)/kG/Hr IV Continuous <Continuous>  levETIRAcetam  IVPB 500 milliGRAM(s) IV Intermittent every 12 hours  ondansetron Injectable 4 milliGRAM(s) IV Push every 6 hours PRN Nausea and/or Vomiting    Cardiac:  niCARdipine Infusion 5 mG/Hr IV Continuous <Continuous>    Pulm:    GI/:  pantoprazole  Injectable 40 milliGRAM(s) IV Push daily  polyethylene glycol 3350 17 Gram(s) Oral every 12 hours  senna 2 Tablet(s) Oral at bedtime    Other:   chlorhexidine 0.12% Liquid 15 milliLiter(s) Oral Mucosa every 12 hours  chlorhexidine 4% Liquid 1 Application(s) Topical <User Schedule>  dexAMETHasone  Injectable 4 milliGRAM(s) IV Push every 6 hours  dexAMETHasone  Injectable 4 milliGRAM(s) IV Push every 6 hours  sodium chloride 0.9% with potassium chloride 20 mEq/L 1000 milliLiter(s) IV Continuous <Continuous>    DIET: [] Regular [] CCD [] Renal [] Puree [] Dysphagia [] Tube Feeds:     IMAGING:

## 2020-05-10 NOTE — SPEECH LANGUAGE PATHOLOGY EVALUATION - SLP PERTINENT HISTORY OF CURRENT PROBLEM
PMH of HTN, HLD, transferred from [facility] for NSx eval for large L. BG IPH. Patient presented with severe headache with subsequent syncopal episode. Minimally responsive and intubated for airway protection. Was HTN 170s, started on Caredene and prop gtt. Cardene discontinued prior to transfer. Upon arrival, with sedation held she was unable to open her eyes, not following commands, moving spontaneously on the L, RUE loc, RLE WDs. After extensive discussion with the family regarding goals of care, Pt brought o OR for emergent clot evacuation.

## 2020-05-10 NOTE — PROGRESS NOTE ADULT - ASSESSMENT
87F w/ PMH of HTN, HLD, transferred from [facility] for NSG eval after found to have large L. BG IPH. Now POD1 s/p L larry evacuation of IPH.     - SBP< 140  - Repeat CTH in AM   - Keppra  - Dex 4q6  - q. 1 hour neuro checks   - Stroke neurology following

## 2020-05-11 LAB
ANION GAP SERPL CALC-SCNC: 14 MMOL/L — SIGNIFICANT CHANGE UP (ref 5–17)
BUN SERPL-MCNC: 22 MG/DL — SIGNIFICANT CHANGE UP (ref 7–23)
CALCIUM SERPL-MCNC: 9 MG/DL — SIGNIFICANT CHANGE UP (ref 8.4–10.5)
CHLORIDE SERPL-SCNC: 109 MMOL/L — HIGH (ref 96–108)
CO2 SERPL-SCNC: 20 MMOL/L — LOW (ref 22–31)
CREAT SERPL-MCNC: 0.57 MG/DL — SIGNIFICANT CHANGE UP (ref 0.5–1.3)
GAS PNL BLDA: SIGNIFICANT CHANGE UP
GAS PNL BLDA: SIGNIFICANT CHANGE UP
GLUCOSE SERPL-MCNC: 147 MG/DL — HIGH (ref 70–99)
HCT VFR BLD CALC: 34.2 % — LOW (ref 34.5–45)
HGB BLD-MCNC: 11.1 G/DL — LOW (ref 11.5–15.5)
MAGNESIUM SERPL-MCNC: 2.1 MG/DL — SIGNIFICANT CHANGE UP (ref 1.6–2.6)
MCHC RBC-ENTMCNC: 31.4 PG — SIGNIFICANT CHANGE UP (ref 27–34)
MCHC RBC-ENTMCNC: 32.5 GM/DL — SIGNIFICANT CHANGE UP (ref 32–36)
MCV RBC AUTO: 96.6 FL — SIGNIFICANT CHANGE UP (ref 80–100)
NRBC # BLD: 0 /100 WBCS — SIGNIFICANT CHANGE UP (ref 0–0)
PHOSPHATE SERPL-MCNC: 2.4 MG/DL — LOW (ref 2.5–4.5)
PLATELET # BLD AUTO: 166 K/UL — SIGNIFICANT CHANGE UP (ref 150–400)
POTASSIUM SERPL-MCNC: 3.8 MMOL/L — SIGNIFICANT CHANGE UP (ref 3.5–5.3)
POTASSIUM SERPL-SCNC: 3.8 MMOL/L — SIGNIFICANT CHANGE UP (ref 3.5–5.3)
RBC # BLD: 3.54 M/UL — LOW (ref 3.8–5.2)
RBC # FLD: 13.5 % — SIGNIFICANT CHANGE UP (ref 10.3–14.5)
SARS-COV-2 RNA SPEC QL NAA+PROBE: SIGNIFICANT CHANGE UP
SODIUM SERPL-SCNC: 143 MMOL/L — SIGNIFICANT CHANGE UP (ref 135–145)
WBC # BLD: 10.63 K/UL — HIGH (ref 3.8–10.5)
WBC # FLD AUTO: 10.63 K/UL — HIGH (ref 3.8–10.5)

## 2020-05-11 PROCEDURE — 71045 X-RAY EXAM CHEST 1 VIEW: CPT | Mod: 26

## 2020-05-11 PROCEDURE — 99291 CRITICAL CARE FIRST HOUR: CPT

## 2020-05-11 PROCEDURE — 70450 CT HEAD/BRAIN W/O DYE: CPT | Mod: 26

## 2020-05-11 PROCEDURE — 99292 CRITICAL CARE ADDL 30 MIN: CPT

## 2020-05-11 RX ORDER — DEXAMETHASONE 0.5 MG/5ML
4 ELIXIR ORAL EVERY 6 HOURS
Refills: 0 | Status: DISCONTINUED | OUTPATIENT
Start: 2020-05-11 | End: 2020-05-12

## 2020-05-11 RX ORDER — ENOXAPARIN SODIUM 100 MG/ML
40 INJECTION SUBCUTANEOUS
Refills: 0 | Status: DISCONTINUED | OUTPATIENT
Start: 2020-05-11 | End: 2020-05-28

## 2020-05-11 RX ORDER — OXYCODONE HYDROCHLORIDE 5 MG/1
10 TABLET ORAL EVERY 4 HOURS
Refills: 0 | Status: DISCONTINUED | OUTPATIENT
Start: 2020-05-11 | End: 2020-05-16

## 2020-05-11 RX ORDER — SIMVASTATIN 20 MG/1
40 TABLET, FILM COATED ORAL AT BEDTIME
Refills: 0 | Status: DISCONTINUED | OUTPATIENT
Start: 2020-05-11 | End: 2020-05-14

## 2020-05-11 RX ORDER — LEVETIRACETAM 250 MG/1
500 TABLET, FILM COATED ORAL
Refills: 0 | Status: DISCONTINUED | OUTPATIENT
Start: 2020-05-11 | End: 2020-05-13

## 2020-05-11 RX ORDER — IPRATROPIUM/ALBUTEROL SULFATE 18-103MCG
3 AEROSOL WITH ADAPTER (GRAM) INHALATION EVERY 6 HOURS
Refills: 0 | Status: DISCONTINUED | OUTPATIENT
Start: 2020-05-11 | End: 2020-05-16

## 2020-05-11 RX ORDER — OXYCODONE HYDROCHLORIDE 5 MG/1
5 TABLET ORAL EVERY 4 HOURS
Refills: 0 | Status: DISCONTINUED | OUTPATIENT
Start: 2020-05-11 | End: 2020-05-16

## 2020-05-11 RX ORDER — PANTOPRAZOLE SODIUM 20 MG/1
40 TABLET, DELAYED RELEASE ORAL
Refills: 0 | Status: DISCONTINUED | OUTPATIENT
Start: 2020-05-11 | End: 2020-05-12

## 2020-05-11 RX ADMIN — DEXTROSE MONOHYDRATE, SODIUM CHLORIDE, AND POTASSIUM CHLORIDE 75 MILLILITER(S): 50; .745; 4.5 INJECTION, SOLUTION INTRAVENOUS at 20:51

## 2020-05-11 RX ADMIN — SENNA PLUS 2 TABLET(S): 8.6 TABLET ORAL at 20:52

## 2020-05-11 RX ADMIN — Medication 4 MILLIGRAM(S): at 17:54

## 2020-05-11 RX ADMIN — Medication 3 MILLILITER(S): at 17:45

## 2020-05-11 RX ADMIN — NICARDIPINE HYDROCHLORIDE 25 MG/HR: 30 CAPSULE, EXTENDED RELEASE ORAL at 20:51

## 2020-05-11 RX ADMIN — LOSARTAN POTASSIUM 100 MILLIGRAM(S): 100 TABLET, FILM COATED ORAL at 09:23

## 2020-05-11 RX ADMIN — POLYETHYLENE GLYCOL 3350 17 GRAM(S): 17 POWDER, FOR SOLUTION ORAL at 05:51

## 2020-05-11 RX ADMIN — DEXTROSE MONOHYDRATE, SODIUM CHLORIDE, AND POTASSIUM CHLORIDE 75 MILLILITER(S): 50; .745; 4.5 INJECTION, SOLUTION INTRAVENOUS at 06:51

## 2020-05-11 RX ADMIN — Medication 4 MILLIGRAM(S): at 22:50

## 2020-05-11 RX ADMIN — Medication 4 MILLIGRAM(S): at 05:52

## 2020-05-11 RX ADMIN — Medication 3 MILLILITER(S): at 23:34

## 2020-05-11 RX ADMIN — POLYETHYLENE GLYCOL 3350 17 GRAM(S): 17 POWDER, FOR SOLUTION ORAL at 17:54

## 2020-05-11 RX ADMIN — NICARDIPINE HYDROCHLORIDE 25 MG/HR: 30 CAPSULE, EXTENDED RELEASE ORAL at 11:05

## 2020-05-11 RX ADMIN — DEXTROSE MONOHYDRATE, SODIUM CHLORIDE, AND POTASSIUM CHLORIDE 75 MILLILITER(S): 50; .745; 4.5 INJECTION, SOLUTION INTRAVENOUS at 11:06

## 2020-05-11 RX ADMIN — CHLORHEXIDINE GLUCONATE 1 APPLICATION(S): 213 SOLUTION TOPICAL at 20:52

## 2020-05-11 RX ADMIN — Medication 4 MILLIGRAM(S): at 14:00

## 2020-05-11 RX ADMIN — SIMVASTATIN 40 MILLIGRAM(S): 20 TABLET, FILM COATED ORAL at 20:52

## 2020-05-11 RX ADMIN — ENOXAPARIN SODIUM 40 MILLIGRAM(S): 100 INJECTION SUBCUTANEOUS at 17:55

## 2020-05-11 RX ADMIN — LEVETIRACETAM 500 MILLIGRAM(S): 250 TABLET, FILM COATED ORAL at 17:55

## 2020-05-11 RX ADMIN — CHLORHEXIDINE GLUCONATE 15 MILLILITER(S): 213 SOLUTION TOPICAL at 05:52

## 2020-05-11 NOTE — PROGRESS NOTE ADULT - SUBJECTIVE AND OBJECTIVE BOX
HPI:  87F w/ PMH of HTN, HLD, transferred from [facility] for NSG eval after found to have large L. BG IPH.     Patient found to have severe headache with subsequent syncopal episode. Found to be minimally responsive and so intubated for airway protection. Was HTN 170s, started on Caredene and prop gtt. Cardene discontinue prior to transfer. Upon arrival, with sedation held she was unable to open her eyes, not following commands, moving spontaneously on the L, RUE loc, RLE WDs. After extensive discussion with the family regarding goals of care, she was brought to the operating room for emergent clot evacuation. (09 May 2020 20:05)    OVERNIGHT EVENTS:  - admitted to PACU s/p Lt larry of evacuation of ICH    Vital Signs Last 24 Hrs  T(C): 36.1 (10 May 2020 09:30), Max: 37.1 (10 May 2020 07:30)  T(F): 97 (10 May 2020 09:30), Max: 98.8 (10 May 2020 07:30)  HR: 81 (10 May 2020 12:00) (57 - 103)  BP: 123/60 (10 May 2020 10:45) (100/53 - 145/71)  BP(mean): 86 (10 May 2020 10:45) (75 - 94)  RR: 16 (10 May 2020 12:00) (11 - 18)  SpO2: 100% (10 May 2020 12:00) (99% - 100%)    I&O's Detail    09 May 2020 07:01  -  10 May 2020 07:00  --------------------------------------------------------  IN:    IV PiggyBack: 400 mL    niCARdipine Infusion: 20 mL    niCARdipine Infusion: 80 mL    sodium chloride 0.9% with potassium chloride 20 mEq/L: 825 mL  Total IN: 1325 mL    OUT:    Indwelling Catheter - Urethral: 445 mL  Total OUT: 445 mL    Total NET: 880 mL      10 May 2020 07:01  -  10 May 2020 12:25  --------------------------------------------------------  IN:    IV PiggyBack: 100 mL    niCARdipine Infusion: 50 mL    sodium chloride 0.9% with potassium chloride 20 mEq/L: 375 mL  Total IN: 525 mL    OUT:    Indwelling Catheter - Urethral: 680 mL  Total OUT: 680 mL    Total NET: -155 mL        I&O's Summary    09 May 2020 07:01  -  10 May 2020 07:00  --------------------------------------------------------  IN: 1325 mL / OUT: 445 mL / NET: 880 mL    10 May 2020 07:01  -  10 May 2020 12:25  --------------------------------------------------------  IN: 525 mL / OUT: 680 mL / NET: -155 mL        PHYSICAL EXAM:  Neurological:  intubated, EO to noxious stimuli, pupils 2mm reactive, not FC, LUE loc, LLE wds, RUE ext, RLE wds  Cardiovascular: +s1, s2  Respiratory: clear to auscultation b/l  Gastrointestinal: soft, non-distended, non-tender  Extremities: warm, dry  Incision/Wound: dressing C/D/I    LABS:                        12.7   7.21  )-----------( 182      ( 09 May 2020 21:12 )             37.4     05-09    140  |  104  |  21  ----------------------------<  144<H>  3.5   |  22  |  0.54    Ca    9.2      09 May 2020 21:12  Phos  4.5     05-09  Mg     1.9     05-09    TPro  6.8  /  Alb  4.1  /  TBili  0.4  /  DBili  x   /  AST  23  /  ALT  12  /  AlkPhos  50  05-09    PT/INR - ( 09 May 2020 13:52 )   PT: 10.7 sec;   INR: 0.94 ratio         PTT - ( 09 May 2020 13:52 )  PTT:28.7 sec        CAPILLARY BLOOD GLUCOSE          Drug Levels: [] N/A    CSF Analysis: [] N/A      Allergies    No Known Allergies    Intolerances      MEDICATIONS:  Antibiotics:  ceFAZolin   IVPB 2000 milliGRAM(s) IV Intermittent every 8 hours    Neuro:  acetaminophen   Tablet .. 650 milliGRAM(s) Oral every 6 hours PRN  dexMEDEtomidine Infusion 0.2 MICROgram(s)/kG/Hr IV Continuous <Continuous>  levETIRAcetam  IVPB 500 milliGRAM(s) IV Intermittent every 12 hours  ondansetron Injectable 4 milliGRAM(s) IV Push every 6 hours PRN    Anticoagulation:    OTHER:  chlorhexidine 0.12% Liquid 15 milliLiter(s) Oral Mucosa every 12 hours  chlorhexidine 4% Liquid 1 Application(s) Topical <User Schedule>  dexAMETHasone  Injectable 4 milliGRAM(s) IV Push every 6 hours  losartan 100 milliGRAM(s) Oral daily  niCARdipine Infusion 5 mG/Hr IV Continuous <Continuous>  pantoprazole  Injectable 40 milliGRAM(s) IV Push daily  polyethylene glycol 3350 17 Gram(s) Oral every 12 hours  senna 2 Tablet(s) Oral at bedtime    IVF:  sodium chloride 0.9% with potassium chloride 20 mEq/L 1000 milliLiter(s) IV Continuous <Continuous>    CULTURES:    RADIOLOGY & ADDITIONAL TESTS: SUMMARY:   87 year-old woman with history of hypertension and presented to [facility] on hyperlipidemia HLD, transferred from [facility] for NSG eval after found to have large L. BG IPH.     Patient found to have severe headache with subsequent syncopal episode. Found to be minimally responsive and so intubated for airway protection. Was HTN 170s, started on Caredene and prop gtt. Cardene discontinue prior to transfer. Upon arrival, with sedation held she was unable to open her eyes, not following commands, moving spontaneously on the L, RUE loc, RLE WDs. After extensive discussion with the family regarding goals of care, she was brought to the operating room for emergent clot evacuation. (09 May 2020 20:05)    OVERNIGHT EVENTS:  - admitted to PACU s/p Lt larry of evacuation of ICH    Vital Signs Last 24 Hrs  T(C): 36.1 (10 May 2020 09:30), Max: 37.1 (10 May 2020 07:30)  T(F): 97 (10 May 2020 09:30), Max: 98.8 (10 May 2020 07:30)  HR: 81 (10 May 2020 12:00) (57 - 103)  BP: 123/60 (10 May 2020 10:45) (100/53 - 145/71)  BP(mean): 86 (10 May 2020 10:45) (75 - 94)  RR: 16 (10 May 2020 12:00) (11 - 18)  SpO2: 100% (10 May 2020 12:00) (99% - 100%)    I&O's Detail    09 May 2020 07:01  -  10 May 2020 07:00  --------------------------------------------------------  IN:    IV PiggyBack: 400 mL    niCARdipine Infusion: 20 mL    niCARdipine Infusion: 80 mL    sodium chloride 0.9% with potassium chloride 20 mEq/L: 825 mL  Total IN: 1325 mL    OUT:    Indwelling Catheter - Urethral: 445 mL  Total OUT: 445 mL    Total NET: 880 mL      10 May 2020 07:01  -  10 May 2020 12:25  --------------------------------------------------------  IN:    IV PiggyBack: 100 mL    niCARdipine Infusion: 50 mL    sodium chloride 0.9% with potassium chloride 20 mEq/L: 375 mL  Total IN: 525 mL    OUT:    Indwelling Catheter - Urethral: 680 mL  Total OUT: 680 mL    Total NET: -155 mL        I&O's Summary    09 May 2020 07:01  -  10 May 2020 07:00  --------------------------------------------------------  IN: 1325 mL / OUT: 445 mL / NET: 880 mL    10 May 2020 07:01  -  10 May 2020 12:25  --------------------------------------------------------  IN: 525 mL / OUT: 680 mL / NET: -155 mL        PHYSICAL EXAM:  Neurological:  intubated, EO to noxious stimuli, pupils 2mm reactive, not FC, LUE loc, LLE wds, RUE ext, RLE wds  Cardiovascular: +s1, s2  Respiratory: clear to auscultation b/l  Gastrointestinal: soft, non-distended, non-tender  Extremities: warm, dry  Incision/Wound: dressing C/D/I    LABS:                        12.7   7.21  )-----------( 182      ( 09 May 2020 21:12 )             37.4     05-09    140  |  104  |  21  ----------------------------<  144<H>  3.5   |  22  |  0.54    Ca    9.2      09 May 2020 21:12  Phos  4.5     05-09  Mg     1.9     05-09    TPro  6.8  /  Alb  4.1  /  TBili  0.4  /  DBili  x   /  AST  23  /  ALT  12  /  AlkPhos  50  05-09    PT/INR - ( 09 May 2020 13:52 )   PT: 10.7 sec;   INR: 0.94 ratio         PTT - ( 09 May 2020 13:52 )  PTT:28.7 sec        CAPILLARY BLOOD GLUCOSE          Drug Levels: [] N/A    CSF Analysis: [] N/A      Allergies    No Known Allergies    Intolerances      MEDICATIONS:  Antibiotics:  ceFAZolin   IVPB 2000 milliGRAM(s) IV Intermittent every 8 hours    Neuro:  acetaminophen   Tablet .. 650 milliGRAM(s) Oral every 6 hours PRN  dexMEDEtomidine Infusion 0.2 MICROgram(s)/kG/Hr IV Continuous <Continuous>  levETIRAcetam  IVPB 500 milliGRAM(s) IV Intermittent every 12 hours  ondansetron Injectable 4 milliGRAM(s) IV Push every 6 hours PRN    Anticoagulation:    OTHER:  chlorhexidine 0.12% Liquid 15 milliLiter(s) Oral Mucosa every 12 hours  chlorhexidine 4% Liquid 1 Application(s) Topical <User Schedule>  dexAMETHasone  Injectable 4 milliGRAM(s) IV Push every 6 hours  losartan 100 milliGRAM(s) Oral daily  niCARdipine Infusion 5 mG/Hr IV Continuous <Continuous>  pantoprazole  Injectable 40 milliGRAM(s) IV Push daily  polyethylene glycol 3350 17 Gram(s) Oral every 12 hours  senna 2 Tablet(s) Oral at bedtime    IVF:  sodium chloride 0.9% with potassium chloride 20 mEq/L 1000 milliLiter(s) IV Continuous <Continuous>    CULTURES:    RADIOLOGY & ADDITIONAL TESTS: SUMMARY:   87 year-old woman with history of hypertension and presented to [facility] on 5/9/20 with headache and syncopal episode. She was noted to be minimally responsive, plegic on the right and hypertensive to SBP 170s. She was intubated and stared on both propofol and nicardipine drips. 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 She was transferred to [facility] for further care.   Upon arrival to [facility], her exam was notable for eye opening, no command following, right arm localization, right leg withdrawal and purposeful left sided movements. After extensive discussion with the family regarding goals of care, she was brought to the operating room for emergent clot evacuation.    HOSPITAL COURSE:  5/9: left Priyanka clot evacuation    OVERNIGHT EVENTS:  - admitted to PACU s/p Lt priyanka of evacuation of ICH    Vital Signs Last 24 Hrs  T(C): 36.1 (10 May 2020 09:30), Max: 37.1 (10 May 2020 07:30)  T(F): 97 (10 May 2020 09:30), Max: 98.8 (10 May 2020 07:30)  HR: 81 (10 May 2020 12:00) (57 - 103)  BP: 123/60 (10 May 2020 10:45) (100/53 - 145/71)  BP(mean): 86 (10 May 2020 10:45) (75 - 94)  RR: 16 (10 May 2020 12:00) (11 - 18)  SpO2: 100% (10 May 2020 12:00) (99% - 100%)    I&O's Detail    09 May 2020 07:01  -  10 May 2020 07:00  --------------------------------------------------------  IN:    IV PiggyBack: 400 mL    niCARdipine Infusion: 20 mL    niCARdipine Infusion: 80 mL    sodium chloride 0.9% with potassium chloride 20 mEq/L: 825 mL  Total IN: 1325 mL    OUT:    Indwelling Catheter - Urethral: 445 mL  Total OUT: 445 mL    Total NET: 880 mL      10 May 2020 07:01  -  10 May 2020 12:25  --------------------------------------------------------  IN:    IV PiggyBack: 100 mL    niCARdipine Infusion: 50 mL    sodium chloride 0.9% with potassium chloride 20 mEq/L: 375 mL  Total IN: 525 mL    OUT:    Indwelling Catheter - Urethral: 680 mL  Total OUT: 680 mL    Total NET: -155 mL        I&O's Summary    09 May 2020 07:01  -  10 May 2020 07:00  --------------------------------------------------------  IN: 1325 mL / OUT: 445 mL / NET: 880 mL    10 May 2020 07:01  -  10 May 2020 12:25  --------------------------------------------------------  IN: 525 mL / OUT: 680 mL / NET: -155 mL        PHYSICAL EXAM:  Neurological:  intubated, EO to noxious stimuli, pupils 2mm reactive, not FC, LUE loc, LLE wds, RUE ext, RLE wds  Cardiovascular: +s1, s2  Respiratory: clear to auscultation b/l  Gastrointestinal: soft, non-distended, non-tender  Extremities: warm, dry  Incision/Wound: dressing C/D/I    LABS:                        12.7   7.21  )-----------( 182      ( 09 May 2020 21:12 )             37.4     05-09    140  |  104  |  21  ----------------------------<  144<H>  3.5   |  22  |  0.54    Ca    9.2      09 May 2020 21:12  Phos  4.5     05-09  Mg     1.9     05-09    TPro  6.8  /  Alb  4.1  /  TBili  0.4  /  DBili  x   /  AST  23  /  ALT  12  /  AlkPhos  50  05-09    PT/INR - ( 09 May 2020 13:52 )   PT: 10.7 sec;   INR: 0.94 ratio         PTT - ( 09 May 2020 13:52 )  PTT:28.7 sec        CAPILLARY BLOOD GLUCOSE          Drug Levels: [] N/A    CSF Analysis: [] N/A      Allergies    No Known Allergies    Intolerances      MEDICATIONS:  Antibiotics:  ceFAZolin   IVPB 2000 milliGRAM(s) IV Intermittent every 8 hours    Neuro:  acetaminophen   Tablet .. 650 milliGRAM(s) Oral every 6 hours PRN  dexMEDEtomidine Infusion 0.2 MICROgram(s)/kG/Hr IV Continuous <Continuous>  levETIRAcetam  IVPB 500 milliGRAM(s) IV Intermittent every 12 hours  ondansetron Injectable 4 milliGRAM(s) IV Push every 6 hours PRN    Anticoagulation:    OTHER:  chlorhexidine 0.12% Liquid 15 milliLiter(s) Oral Mucosa every 12 hours  chlorhexidine 4% Liquid 1 Application(s) Topical <User Schedule>  dexAMETHasone  Injectable 4 milliGRAM(s) IV Push every 6 hours  losartan 100 milliGRAM(s) Oral daily  niCARdipine Infusion 5 mG/Hr IV Continuous <Continuous>  pantoprazole  Injectable 40 milliGRAM(s) IV Push daily  polyethylene glycol 3350 17 Gram(s) Oral every 12 hours  senna 2 Tablet(s) Oral at bedtime    IVF:  sodium chloride 0.9% with potassium chloride 20 mEq/L 1000 milliLiter(s) IV Continuous <Continuous>    CULTURES:    RADIOLOGY & ADDITIONAL TESTS: SUMMARY:   87 year-old woman with history of hypertension and presented to [facility] on 5/9/20 with headache and syncopal episode. She was noted to be minimally responsive, plegic on the right and hypertensive to SBP 170s. She was intubated and stared on both propofol and nicardipine drips. 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 She was transferred to [facility] for further care.   Upon arrival to [facility], her exam was notable for eye opening, no command following, right arm localization, right leg withdrawal and purposeful left sided movements. After extensive discussion with the family regarding goals of care, she was brought to the operating room for emergent clot evacuation.    HOSPITAL COURSE:  5/9: left Priyanka clot evacuation    24 HOUR EVENTS:  Tolerating pressure support    VITALS/DATA/ORDERS: [x] Reviewed    Vital Signs Last 24 Hrs  T(C): 36.1 (10 May 2020 09:30), Max: 37.1 (10 May 2020 07:30)  T(F): 97 (10 May 2020 09:30), Max: 98.8 (10 May 2020 07:30)  HR: 81 (10 May 2020 12:00) (57 - 103)  BP: 123/60 (10 May 2020 10:45) (100/53 - 145/71)  BP(mean): 86 (10 May 2020 10:45) (75 - 94)  RR: 16 (10 May 2020 12:00) (11 - 18)  SpO2: 100% (10 May 2020 12:00) (99% - 100%)    I&O's Detail    09 May 2020 07:01  -  10 May 2020 07:00  --------------------------------------------------------  IN:    IV PiggyBack: 400 mL    niCARdipine Infusion: 20 mL    niCARdipine Infusion: 80 mL    sodium chloride 0.9% with potassium chloride 20 mEq/L: 825 mL  Total IN: 1325 mL    OUT:    Indwelling Catheter - Urethral: 445 mL  Total OUT: 445 mL    Total NET: 880 mL      10 May 2020 07:01  -  10 May 2020 12:25  --------------------------------------------------------  IN:    IV PiggyBack: 100 mL    niCARdipine Infusion: 50 mL    sodium chloride 0.9% with potassium chloride 20 mEq/L: 375 mL  Total IN: 525 mL    OUT:    Indwelling Catheter - Urethral: 680 mL  Total OUT: 680 mL    Total NET: -155 mL        I&O's Summary    09 May 2020 07:01  -  10 May 2020 07:00  --------------------------------------------------------  IN: 1325 mL / OUT: 445 mL / NET: 880 mL    10 May 2020 07:01  -  10 May 2020 12:25  --------------------------------------------------------  IN: 525 mL / OUT: 680 mL / NET: -155 mL        PHYSICAL EXAM:  Neurological:  intubated, EO to noxious stimuli, pupils 2mm reactive, not FC, LUE loc, LLE wds, RUE ext, RLE wds  Cardiovascular: +s1, s2  Respiratory: clear to auscultation b/l  Gastrointestinal: soft, non-distended, non-tender  Extremities: warm, dry  Incision/Wound: dressing C/D/I    LABS:                        12.7   7.21  )-----------( 182      ( 09 May 2020 21:12 )             37.4     05-09    140  |  104  |  21  ----------------------------<  144<H>  3.5   |  22  |  0.54    Ca    9.2      09 May 2020 21:12  Phos  4.5     05-09  Mg     1.9     05-09    TPro  6.8  /  Alb  4.1  /  TBili  0.4  /  DBili  x   /  AST  23  /  ALT  12  /  AlkPhos  50  05-09    PT/INR - ( 09 May 2020 13:52 )   PT: 10.7 sec;   INR: 0.94 ratio         PTT - ( 09 May 2020 13:52 )  PTT:28.7 sec        CAPILLARY BLOOD GLUCOSE          Drug Levels: [] N/A    CSF Analysis: [] N/A      Allergies    No Known Allergies    Intolerances      MEDICATIONS:  Antibiotics:  ceFAZolin   IVPB 2000 milliGRAM(s) IV Intermittent every 8 hours    Neuro:  acetaminophen   Tablet .. 650 milliGRAM(s) Oral every 6 hours PRN  dexMEDEtomidine Infusion 0.2 MICROgram(s)/kG/Hr IV Continuous <Continuous>  levETIRAcetam  IVPB 500 milliGRAM(s) IV Intermittent every 12 hours  ondansetron Injectable 4 milliGRAM(s) IV Push every 6 hours PRN    Anticoagulation:    OTHER:  chlorhexidine 0.12% Liquid 15 milliLiter(s) Oral Mucosa every 12 hours  chlorhexidine 4% Liquid 1 Application(s) Topical <User Schedule>  dexAMETHasone  Injectable 4 milliGRAM(s) IV Push every 6 hours  losartan 100 milliGRAM(s) Oral daily  niCARdipine Infusion 5 mG/Hr IV Continuous <Continuous>  pantoprazole  Injectable 40 milliGRAM(s) IV Push daily  polyethylene glycol 3350 17 Gram(s) Oral every 12 hours  senna 2 Tablet(s) Oral at bedtime    IVF:  sodium chloride 0.9% with potassium chloride 20 mEq/L 1000 milliLiter(s) IV Continuous <Continuous>    CULTURES:    RADIOLOGY & ADDITIONAL TESTS: SUMMARY:   87 year-old woman with history of hypertension and hyperlipidemia presented to [facility] on 5/9/20 with headache and syncopal episode. She was noted to be minimally responsive, plegic on the right and hypertensive to SBP 170s. She was intubated and stared on both propofol and nicardipine drips. 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 She was transferred to [facility] for further care.   Upon arrival to [facility], her exam was notable for eye opening, no command following, right arm localization, right leg withdrawal and purposeful left sided movements. After extensive discussion with the family regarding goals of care, she was brought to the operating room for emergent clot evacuation.    HOSPITAL COURSE:  5/9: left Priyanka clot evacuation    24 HOUR EVENTS:  Tolerating pressure support    ADMISSION SCORES:  ICH Score: 3    VITALS/DATA/ORDERS: [x] Reviewed    DEVICES: [x] right radial a-line, coats    PHYSICAL EXAM:  Neurological: eyes open to noxious stimuli, regards, does not follow commands, pupils 2mm reactive, +cough, LUE loc, LLE wds, RUE ext, RLE wds  Cardiovascular: regular  Respiratory: clear, no respiratory distress  Gastrointestinal: soft, non-distended, non-tender  Extremities: warm, dry  Incision/Wound: dressing C/D/I

## 2020-05-11 NOTE — PROGRESS NOTE ADULT - SUBJECTIVE AND OBJECTIVE BOX
Patient found to have severe headache with subsequent syncopal episode. Found to be minimally responsive and so intubated for airway protection. Was HTN 170s, started on Caredene and prop gtt. Cardene discontinue prior to transfer. Upon arrival, with sedation held she was unable to open her eyes, not following commands, moving spontaneously on the L, RUE loc, RLE WDs. After extensive discussion with the family regarding goals of care, she was brought to the operating room for emergent clot evacuation. Patient is s/p Left Priyanka for Intracerebral hemorrhage evacuation on 5/9/20.     OVERNIGHT EVENTS: No acute overnight events.     Vital Signs Last 24 Hrs  T(C): 37.1 (11 May 2020 00:00), Max: 37.1 (10 May 2020 07:30)  T(F): 98.8 (11 May 2020 00:00), Max: 98.8 (10 May 2020 07:30)  HR: 90 (11 May 2020 00:00) (56 - 103)  BP: 112/70 (11 May 2020 00:00) (100/53 - 136/65)  BP(mean): 87 (11 May 2020 00:00) (75 - 94)  RR: 1 (11 May 2020 00:00) (1 - 16)  SpO2: 100% (11 May 2020 00:00) (79% - 100%)    PHYSICAL EXAM:    General: No Acute Distress     Neurological: EO to voice, no FC, LUE spon/Loc, RUE Ext., b/l LE WD   Pulmonary: Clear to Auscultation, No Rales, No Rhonchi, No Wheezes     Cardiovascular: S1, S2, Regular Rate and Rhythm     Gastrointestinal: Soft, Nontender, Nondistended     LABS:                        11.1   10.53 )-----------( 148      ( 10 May 2020 22:27 )             32.1    05-10    142  |  109<H>  |  26<H>  ----------------------------<  148<H>  4.1   |  20<L>  |  0.67    Ca    8.7      10 May 2020 22:27  Phos  3.2     05-10  Mg     2.0     05-10    TPro  6.8  /  Alb  4.1  /  TBili  0.4  /  DBili  x   /  AST  23  /  ALT  12  /  AlkPhos  50  05-09  PT/INR - ( 09 May 2020 13:52 )   PT: 10.7 sec;   INR: 0.94 ratio         PTT - ( 09 May 2020 13:52 )  PTT:28.7 sec      05-09 @ 07:01  -  05-10 @ 07:00  --------------------------------------------------------  IN: 1325 mL / OUT: 445 mL / NET: 880 mL    05-10 @ 07:01  -  05-11 @ 01:18  --------------------------------------------------------  IN: 1919 mL / OUT: 1380 mL / NET: 539 mL      DRAINS:     MEDICATIONS:  Antibiotics:    Neuro:  acetaminophen   Tablet .. 650 milliGRAM(s) Oral every 6 hours PRN Temp greater or equal to 38C (100.4F), Mild Pain (1 - 3)  dexMEDEtomidine Infusion 0.2 MICROgram(s)/kG/Hr IV Continuous <Continuous>  fentaNYL    Injectable 25 MICROGram(s) IV Push every 2 hours PRN agitation/severe pain  levETIRAcetam  IVPB 500 milliGRAM(s) IV Intermittent every 12 hours  ondansetron Injectable 4 milliGRAM(s) IV Push every 6 hours PRN Nausea and/or Vomiting    Cardiac:  losartan 100 milliGRAM(s) Oral daily  niCARdipine Infusion 5 mG/Hr IV Continuous <Continuous>    Pulm:    GI/:  pantoprazole  Injectable 40 milliGRAM(s) IV Push daily  polyethylene glycol 3350 17 Gram(s) Oral every 12 hours  senna 2 Tablet(s) Oral at bedtime    Other:   chlorhexidine 0.12% Liquid 15 milliLiter(s) Oral Mucosa every 12 hours  chlorhexidine 4% Liquid 1 Application(s) Topical <User Schedule>  dexAMETHasone  Injectable 4 milliGRAM(s) IV Push every 6 hours  sodium chloride 0.9% with potassium chloride 20 mEq/L 1000 milliLiter(s) IV Continuous <Continuous>    DIET: [] Regular [] CCD [] Renal [] Puree [] Dysphagia [X] Tube Feeds: Jevity @ 55    IMAGING:   < from: CT Head No Cont (05.10.20 @ 08:53) >  IMPRESSION: No change since 5/9/2020 at 7:20 PM. Decreased left basal ganglia hemorrhage compared with the preoperative examination with less mass effect.    < end of copied text >

## 2020-05-11 NOTE — PROGRESS NOTE ADULT - ASSESSMENT
L Basal Ganglia hemorrhage. ICH3 now s/p Left Priyanka of evacuation of ICH. POD#2    - CTH in AM  - decadron 4q6. WIll taper today  - Wean to extubate  - DVT ppx if CTH stable

## 2020-05-11 NOTE — DIETITIAN INITIAL EVALUATION ADULT. - ENTERAL
1) If EN continues, consider Glucerna 1.2 at goal rate 60ml/hr x 24 hrs. To provide: 1440ml, 1728kcal and 86g protein. Based on dosing wt 82.2kg, provides:  21kcal/kg; based on IBW 58.9kg, provides: 1.46g protein/kg.

## 2020-05-11 NOTE — PROGRESS NOTE ADULT - SUBJECTIVE AND OBJECTIVE BOX
SUBJECTIVE:     OVERNIGHT EVENTS: none    Vital Signs Last 24 Hrs  T(C): 37.5 (11 May 2020 19:00), Max: 37.5 (11 May 2020 19:00)  T(F): 99.5 (11 May 2020 19:00), Max: 99.5 (11 May 2020 19:00)  HR: 81 (11 May 2020 21:00) (54 - 116)  BP: 137/60 (11 May 2020 19:30) (112/70 - 137/60)  BP(mean): 87 (11 May 2020 19:30) (87 - 87)  RR: 28 (11 May 2020 21:00) (12 - 40)  SpO2: 100% (11 May 2020 21:00) (98% - 100%)    PHYSICAL EXAM:    Neurological: eyes open to verbal tactile stimuli , regards,  follows some simple  commands R side , pupils 2mm reactive, LUE loc, LLE wds, RUE ext, RLE wds  Cardiovascular: regular  Respiratory: clear, no respiratory distress  Gastrointestinal: soft, non-distended, non-tender  Extremities: warm, dry  Incision/Wound: dressing C/D/I    LABS:                        11.1   10.53 )-----------( 148      ( 10 May 2020 22:27 )             32.1    05-10    142  |  109<H>  |  26<H>  ----------------------------<  148<H>  4.1   |  20<L>  |  0.67    Ca    8.7      10 May 2020 22:27  Phos  3.2     05-10  Mg     2.0     05-10        IMAGING:         MEDICATIONS:    acetaminophen   Tablet .. 650 milliGRAM(s) Oral every 6 hours PRN Temp greater or equal to 38C (100.4F), Mild Pain (1 - 3)  dexMEDEtomidine Infusion 0.2 MICROgram(s)/kG/Hr IV Continuous <Continuous>  levETIRAcetam 500 milliGRAM(s) Oral two times a day  ondansetron Injectable 4 milliGRAM(s) IV Push every 6 hours PRN Nausea and/or Vomiting  oxyCODONE    Solution 5 milliGRAM(s) Oral every 4 hours PRN Moderate Pain (4 - 6)  oxyCODONE    Solution 10 milliGRAM(s) Oral every 4 hours PRN Severe Pain (7 - 10)  losartan 100 milliGRAM(s) Oral daily  niCARdipine Infusion 5 mG/Hr IV Continuous <Continuous>  albuterol/ipratropium for Nebulization 3 milliLiter(s) Nebulizer every 6 hours  pantoprazole    Tablet 40 milliGRAM(s) Oral before breakfast  polyethylene glycol 3350 17 Gram(s) Oral every 12 hours  senna 2 Tablet(s) Oral at bedtime  chlorhexidine 4% Liquid 1 Application(s) Topical <User Schedule>  dexAMETHasone     Tablet 4 milliGRAM(s) Oral every 6 hours  enoxaparin Injectable 40 milliGRAM(s) SubCutaneous <User Schedule>  simvastatin 40 milliGRAM(s) Oral at bedtime  sodium chloride 0.9% with potassium chloride 20 mEq/L 1000 milliLiter(s) IV Continuous <Continuous>      DIET:     Assessment:  Please Check When Present   []  GCS  E   V  M     Heart Failure: []Acute, [] acute on chronic , []chronic  Heart Failure:  [] Diastolic (HFpEF), [] Systolic (HFrEF), []Combined (HFpEF and HFrEF), [] RHF, [] Pulm HTN, [] Other    [] SCAR, [] ATN, [] AIN, [] other  [] CKD1, [] CKD2, [] CKD 3, [] CKD 4, [] CKD 5, []ESRD    Encephalopathy: [] Metabolic, [] Hepatic, [] toxic, [] Neurological, [] Other    Abnormal Nurtitional Status: [] malnurtition (see nutrition note), [ ]underweight: BMI < 19, [] morbid obesity: BMI >40, [] Cachexia    [] Sepsis  [] hypovolemic shock,[] cardiogenic shock, [] hemorrhagic shock, [] neuogenic shock  [] Acute Respiratory Failure  []Cerebral edema, [] Brain compression/ herniation,   [] Functional quadriplegia  [] Acute blood loss anemia SUBJECTIVE:   Patient found to have severe headache with subsequent syncopal episode. Found to be minimally responsive intubated for airway protection. Was HTN 170s, started on Caredene and prop gtt. Cardene discontinue prior to transfer. Upon arrival, patient unable to open her eyes, not following commands, moving spontaneously on the L, RUE loc, RLE WDs. After extensive discussion with the family regarding goals of care, she was brought to the operating room for emergent clot evacuation. Patient is s/p Left Priyanka for Intracerebral hemorrhage evacuation on 20.   OVERNIGHT EVENTS: Extubated @ 1600 today     Vital Signs Last 24 Hrs  T(C): 37.5 (11 May 2020 19:00), Max: 37.5 (11 May 2020 19:00)  T(F): 99.5 (11 May 2020 19:00), Max: 99.5 (11 May 2020 19:00)  HR: 81 (11 May 2020 21:00) (54 - 116)  BP: 137/60 (11 May 2020 19:30) (112/70 - 137/60)  BP(mean): 87 (11 May 2020 19:30) (87 - 87)  RR: 28 (11 May 2020 21:00) (12 - 40)  SpO2: 100% (11 May 2020 21:00) (98% - 100%)    PHYSICAL EXAM:    Neurological: eyes open to verbal tactile stimuli ,regards,  aphasic, follows some simple  commands R side , pupils 2mm reactive, LUE localizes , LLE wds, RUE ext, RLE wds  Cardiovascular: regular  Respiratory: upper airway rhonchi   Gastrointestinal: soft, non-distended, non-tender  Extremities: warm, dry  Incision/Wound: dressing C/D/I    LABS:                        11.1   10.53 )-----------( 148      ( 10 May 2020 22:27 )             32.1    05-10    142  |  109<H>  |  26<H>  ----------------------------<  148<H>  4.1   |  20<L>  |  0.67    Ca    8.7      10 May 2020 22:27  Phos  3.2     05-10  Mg     2.0     05-10        IMAGIN/11 CT head- No change in moderate residual parenchymal hemorrhage in the left frontal parenchyma and basal ganglia compared with 5/10/2020. No change in mass effect.        MEDICATIONS:    acetaminophen   Tablet .. 650 milliGRAM(s) Oral every 6 hours PRN Temp greater or equal to 38C (100.4F), Mild Pain (1 - 3)  dexMEDEtomidine Infusion 0.2 MICROgram(s)/kG/Hr IV Continuous <Continuous>  levETIRAcetam 500 milliGRAM(s) Oral two times a day  ondansetron Injectable 4 milliGRAM(s) IV Push every 6 hours PRN Nausea and/or Vomiting  oxyCODONE    Solution 5 milliGRAM(s) Oral every 4 hours PRN Moderate Pain (4 - 6)  oxyCODONE    Solution 10 milliGRAM(s) Oral every 4 hours PRN Severe Pain (7 - 10)  losartan 100 milliGRAM(s) Oral daily  niCARdipine Infusion 5 mG/Hr IV Continuous <Continuous>  albuterol/ipratropium for Nebulization 3 milliLiter(s) Nebulizer every 6 hours  pantoprazole    Tablet 40 milliGRAM(s) Oral before breakfast  polyethylene glycol 3350 17 Gram(s) Oral every 12 hours  senna 2 Tablet(s) Oral at bedtime  chlorhexidine 4% Liquid 1 Application(s) Topical <User Schedule>  dexAMETHasone     Tablet 4 milliGRAM(s) Oral every 6 hours  enoxaparin Injectable 40 milliGRAM(s) SubCutaneous <User Schedule>  simvastatin 40 milliGRAM(s) Oral at bedtime  sodium chloride 0.9% with potassium chloride 20 mEq/L 1000 milliLiter(s) IV Continuous <Continuous>      DIET:    NPO . SUBJECTIVE:   Patient found to have severe headache with subsequent syncopal episode. Found to be minimally responsive intubated for airway protection. Was HTN 170s, started on Caredene and prop gtt. Cardene discontinue prior to transfer. Upon arrival, patient unable to open her eyes, not following commands, moving spontaneously on the L, RUE loc, RLE WDs. After extensive discussion with the family regarding goals of care, she was brought to the operating room for emergent clot evacuation. Patient is s/p Left Priyanka for Intracerebral hemorrhage evacuation on 20.   OVERNIGHT EVENTS: Extubated @ 1600 today     Vital Signs Last 24 Hrs  T(C): 37.5 (11 May 2020 19:00), Max: 37.5 (11 May 2020 19:00)  T(F): 99.5 (11 May 2020 19:00), Max: 99.5 (11 May 2020 19:00)  HR: 81 (11 May 2020 21:00) (54 - 116)  BP: 137/60 (11 May 2020 19:30) (112/70 - 137/60)  BP(mean): 87 (11 May 2020 19:30) (87 - 87)  RR: 28 (11 May 2020 21:00) (12 - 40)  SpO2: 100% (11 May 2020 21:00) (98% - 100%)    PHYSICAL EXAM:    Neurological: eyes open to verbal tactile stimuli ,regards,  aphasic, follows some simple  commands left  side , pupils 2mm reactive, LUE 4/5  , LLE 3/5 RUE ext, RLE wds  Cardiovascular: regular  Respiratory: upper airway rhonchi   Gastrointestinal: soft, non-distended, non-tender  Extremities: warm, dry  Incision/Wound: dressing C/D/I    LABS:                        11.1   10.53 )-----------( 148      ( 10 May 2020 22:27 )             32.1    05-10    142  |  109<H>  |  26<H>  ----------------------------<  148<H>  4.1   |  20<L>  |  0.67    Ca    8.7      10 May 2020 22:27  Phos  3.2     05-10  Mg     2.0     05-10        IMAGIN/11 CT head- No change in moderate residual parenchymal hemorrhage in the left frontal parenchyma and basal ganglia compared with 5/10/2020. No change in mass effect.        MEDICATIONS:    acetaminophen   Tablet .. 650 milliGRAM(s) Oral every 6 hours PRN Temp greater or equal to 38C (100.4F), Mild Pain (1 - 3)  dexMEDEtomidine Infusion 0.2 MICROgram(s)/kG/Hr IV Continuous <Continuous>  levETIRAcetam 500 milliGRAM(s) Oral two times a day  ondansetron Injectable 4 milliGRAM(s) IV Push every 6 hours PRN Nausea and/or Vomiting  oxyCODONE    Solution 5 milliGRAM(s) Oral every 4 hours PRN Moderate Pain (4 - 6)  oxyCODONE    Solution 10 milliGRAM(s) Oral every 4 hours PRN Severe Pain (7 - 10)  losartan 100 milliGRAM(s) Oral daily  niCARdipine Infusion 5 mG/Hr IV Continuous <Continuous>  albuterol/ipratropium for Nebulization 3 milliLiter(s) Nebulizer every 6 hours  pantoprazole    Tablet 40 milliGRAM(s) Oral before breakfast  polyethylene glycol 3350 17 Gram(s) Oral every 12 hours  senna 2 Tablet(s) Oral at bedtime  chlorhexidine 4% Liquid 1 Application(s) Topical <User Schedule>  dexAMETHasone     Tablet 4 milliGRAM(s) Oral every 6 hours  enoxaparin Injectable 40 milliGRAM(s) SubCutaneous <User Schedule>  simvastatin 40 milliGRAM(s) Oral at bedtime  sodium chloride 0.9% with potassium chloride 20 mEq/L 1000 milliLiter(s) IV Continuous <Continuous>      DIET:    NPO .

## 2020-05-11 NOTE — PROGRESS NOTE ADULT - ASSESSMENT
HPI:  87F w/ PMH of HTN, HLD, transferred from [facility] for NSG eval after found to have large L. BG IPH.     Patient found to have severe headache with subsequent syncopal episode. Found to be minimally responsive and so intubated for airway protection. Was HTN 170s, started on Caredene and prop gtt. Cardene discontinue prior to transfer. Upon arrival, with sedation held she was unable to open her eyes, not following commands, moving spontaneously on the L, RUE loc, RLE WDs. After extensive discussion with the family regarding goals of care, she was brought to the operating room for emergent clot evacuation. (09 May 2020 20:05)    PROCEDURE: Left Priyanka of evacuation of ICH     POD#1    PLAN:  Neuro:   Left Basal ganglion heme, ICH score 3, s/p Priyanka   Neuro checks q 1 hour  off sedation, Precedex PRN   CT head this am stable compared to postop CTH overnight  will repeat CTH tomorrow, and start DVT ppx if stable  continue Decadron 4 q6   Keppra 500 BID    Respiratory:   Vent settin/470/5/40%  will titrate vent settings based on ABG  maintain SpO2 > 92%   CPAP as tolerated    CV:  -140   Nicardipine   Losartan 100mg daily   continue Simvastatin  TTE-p     GI:    will start TF with jevity given stable CTH  senna, miralax  GI ppx: Protonix     Renal:   NS+K @ 75 until TF at goal  monitor I&O    Endocrine:   Euglycemia   A1C 5.5     Heme/Onc:               DVT ppx: [] SQH [] SQL [ X] venodynes bilaterally  Lower extremity dopplers pending to rule out DVT on admission    ID: Afebrile     PT/OT: pending       CODE STATUS:  [x] Full Code     DISPOSITION:  [x] ICU   [x] Patient is at high risk of neurologic deterioration/death due to: Large left basal ganglia hemorrhage      Total critical care time 45 minutes 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, status post Priyanka evacuation, post-operative day 2    Neuro:   neuro checks q 1 hour  off sedation, Precedex PRN   CT head for stability  steroids for cerebral edema   levetiracetam per NSGY    Respiratory:   maintain SpO2 > 92%   pressure support as tolerated; minimal secretions, good cough, pulling good Vt sp plan to extubate after pressure support trial     CV:  -140mmHg  hypertension: losartan  hyperlipemia: statin   TTE-p     GI:    NPO for possible extubation  GI prophylaxis while on vent  convert medications to enteral access    Renal:   d/c coats    Endocrine:   euglycemia   A1C 5.5     Heme/Onc:               DVT ppx: [] SQH [] SQL [X] venodynes bilaterally  start chemoppx if CT head stable today    ID: afebrile     CODE STATUS:  [x] Full Code     DISPOSITION:  [x] ICU     [x] Patient is at high risk of neurologic deterioration/death due to: Large left basal ganglia hemorrhage, brain compression    Total critical care time 45 minutes

## 2020-05-11 NOTE — PROGRESS NOTE ADULT - ASSESSMENT
Left Basal ganglion heme, ICH score 3, s/p Priyanka 5/9/20. Extubated 5/11/20     Plan     Neuro: Neuro checks q 1 hour. off sedation  Resp- Chest PT, and suctioning prn . Maintain HOB >30 degree.   GI- Maintain NPO until am. Speech and swallow eval  - Urinary retention, Intermittent cath. renal function adequate   Endo Blood glucose <150mg/ dl Left Basal ganglion heme, ICH score 3, s/p Priyanka 5/9/20. Extubated 5/11/20     Plan     Neuro: Neuro checks q 1 hour. off sedation. CT head stable   Resp- Chest PT, and suctioning prn . Maintain HOB >30 degree.   GI- Maintain NPO until am. Speech and swallow eval  - Urinary retention, Intermittent cath. renal function adequate   Endo Blood glucose <150mg/ dl   Heme/Onc:               DVT ppx: SQL   ID: afebrile     CODE STATUS:  [x] Full Code     DISPOSITION:  [x] ICU     [x] Patient is at high risk of neurologic deterioration/death due to: Large left basal ganglia hemorrhage, brain compression    Total critical care time 45 minute Left Basal ganglion heme, ICH score 3, s/p Priyanka 5/9/20. Extubated 5/11/20     Plan     Neuro: Neuro checks q 1 hour. off sedation. CT head stable   Resp- Chest PT, and suctioning prn . Maintain HOB >30 degree.   CV- On Cardene gtt SBP <140   GI- Maintain NPO until am. Speech and swallow eval  - Urinary retention, Intermittent cath. renal function adequate   Endo Blood glucose <150mg/ dl   Heme/Onc DVT ppx: SQL   ID: afebrile     CODE STATUS:  [x] Full Code     DISPOSITION:  [x] ICU     [x] Patient is at high risk of neurologic deterioration/death due to: Large left basal ganglia hemorrhage, brain compression    Total critical care time 45 minute

## 2020-05-11 NOTE — PROGRESS NOTE ADULT - ASSESSMENT
PROCEDURE: Left Priyanka of evacuation of ICH     POD#1    PLAN:  Neuro:   Left Basal ganglion heme, ICH score 3, s/p Priyanka   Neuro checks q 1 hour  off sedation  CT head this am stable  will repeat CTH tomorrow, and start DVT ppx if stable  continue Decadron 4 q6   Keppra 500 BID    Respiratory:   Vent settin/470/5/40%  will titrate vent settings based on ABG maintain SpO2 > 92%   CPAP as tolerated  Wean to extubate    CV:  -140   Losartan 100mg daily continue Simvastatin  TTE-p     GI:    TF with jevity given stable CTH  NPO at 4am for possible extubation  senna, miralax  GI ppx: Protonix     Renal:   NS+K @ 75 until TF at goal  monitor I&O    Endocrine:   Euglycemia   A1C 5.5     Heme/Onc:               DVT ppx: [] SQH [] SQL [ X] venodynes bilaterally  Lower extremity dopplers pending to rule out DVT on admission    ID: Afebrile     PT/OT: pending       CODE STATUS:  [x] Full Code     DISPOSITION:  [x] ICU

## 2020-05-11 NOTE — AIRWAY REMOVAL NOTE  ADULT & PEDS - ARTIFICAL AIRWAY REMOVAL COMMENTS
Written order for extubation verified. The patient was identified by full name and birth date compared to the identification band. Present during the procedure was Vinicio ABBOTT

## 2020-05-11 NOTE — DIETITIAN INITIAL EVALUATION ADULT. - ENERGY NEEDS
Ht: 66"  Wt: 181.4 lbs  BMI: 29.0 kg/m2   IBW: 130 lbs (+/-10%)    140 % IBW  Edema: none noted         Skin: surgical incision occipital

## 2020-05-11 NOTE — DIETITIAN INITIAL EVALUATION ADULT. - ADD RECOMMEND
1) If PO diet initiated, consider no therapeutic restrictions; if persistent elevated FSBG / continued use of decadron, consider consistent carbohydrate diet. Defer consistency to medical team, SLP. 2) Trend BG levels, renal indices, LFT's, electrolytes and triglycerides 3) Monitor GI tolerance. RD to remain available to adjust EN formulary, volume/rate PRN. 4) Obtain subjective wt/diet history from pt family PRN.

## 2020-05-11 NOTE — PROGRESS NOTE ADULT - ASSESSMENT
acute respiratory failure status post extubation  monitor for stridor, currently none  chest PT, hypertonic saline nebs PRN  aspiration precautions  monitor for need for re-intubation    additional 30 minutes critical care time

## 2020-05-11 NOTE — PROGRESS NOTE ADULT - SUBJECTIVE AND OBJECTIVE BOX
Patient seen and examined at bedside.    --Anticoagulation--    T(C): 37.1 (05-11-20 @ 00:00), Max: 37.1 (05-10-20 @ 07:30)  HR: 90 (05-11-20 @ 00:00) (56 - 103)  BP: 112/70 (05-11-20 @ 00:00) (100/53 - 136/65)  RR: 1 (05-11-20 @ 00:00) (1 - 16)  SpO2: 100% (05-11-20 @ 00:00) (79% - 100%)  Wt(kg): --    Exam:  intubated,   EO to noxious stimuli, pupils 2mm reactive, not FC,   LUE spont, LLE wds, RUE wds/spont, RLE wds

## 2020-05-11 NOTE — DIETITIAN INITIAL EVALUATION ADULT. - PERTINENT MEDS FT
Peridex, Hibiclens, NaCl 0.9% with KCl, Decadron, Keppra, Protonix, Zocor, Fentanyl, Cozaar, Precedex, Nicardipine, Zofran, Miralax, Zofran, Senna

## 2020-05-11 NOTE — DIETITIAN INITIAL EVALUATION ADULT. - PHYSICAL APPEARANCE
Unable to conduct Nutrition Focused Physical Assessment due to risk of COVID19+ precautions. BMI 29.3.

## 2020-05-11 NOTE — DIETITIAN INITIAL EVALUATION ADULT. - OTHER INFO
Pt is an 88 yo F with hx HTN, hyperlipidemia. Presented to OSH on 5/9/20 with headache, syncopal episode. Noted to be minimally responsive, plegic on the right and hypertensive. Was intubated and started on pressors. 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 Transferred to [facility] for further care. Pt now s/p left larry clot evacuation on 5/9. Patient remains intubated; continues on decadron as ordered. Plan to wean to extubate as able.     Pt intubated, no family at bedside. Unable to obtain subjective wt/diet history at this time. Unknown as to whether pt took vitamins/supplements PTA.     Per review of EMR, no known food allergies noted. Baseline tolerance to chewing/swallowing unknown. Baseline adequacy of provision of energy/protein intake unclear. Currently NPO with EN feeds; prescribed Jevity 1.2 at goal rate 55ml/hr x 18 hrs. Currently NPO with EN held for plan to wean to extubated. Thus far, pt has received a total of 400ml (between 5/10-5/11). Propofol now discontinued.      Dosing wt (5/9/20): 181.4 lbs. UBW unknown.    No documented BM's recorded in-house at this time. On bowel regimen as ordered.

## 2020-05-11 NOTE — DIETITIAN INITIAL EVALUATION ADULT. - PERTINENT LABORATORY DATA
(5/10): Hgb 11.1, Hct 32.1, Cl 109, BUN 26, Glu 148, A1c (estimated average) 5.5%; Blood Gas: Cl 112, Glu 142, Hgb 11.4, Hct 35

## 2020-05-11 NOTE — PROGRESS NOTE ADULT - SUBJECTIVE AND OBJECTIVE BOX
Patient tolerating pressure support, pulling good Vt. Extubated. No stridor post-extubation. Some upper airway secretions.

## 2020-05-12 LAB
ANION GAP SERPL CALC-SCNC: 12 MMOL/L — SIGNIFICANT CHANGE UP (ref 5–17)
BUN SERPL-MCNC: 24 MG/DL — HIGH (ref 7–23)
CALCIUM SERPL-MCNC: 8.7 MG/DL — SIGNIFICANT CHANGE UP (ref 8.4–10.5)
CHLORIDE SERPL-SCNC: 111 MMOL/L — HIGH (ref 96–108)
CO2 SERPL-SCNC: 20 MMOL/L — LOW (ref 22–31)
CREAT SERPL-MCNC: 0.55 MG/DL — SIGNIFICANT CHANGE UP (ref 0.5–1.3)
GLUCOSE BLDC GLUCOMTR-MCNC: 125 MG/DL — HIGH (ref 70–99)
GLUCOSE BLDC GLUCOMTR-MCNC: 135 MG/DL — HIGH (ref 70–99)
GLUCOSE SERPL-MCNC: 146 MG/DL — HIGH (ref 70–99)
HCT VFR BLD CALC: 31.7 % — LOW (ref 34.5–45)
HGB BLD-MCNC: 10.4 G/DL — LOW (ref 11.5–15.5)
MAGNESIUM SERPL-MCNC: 2.1 MG/DL — SIGNIFICANT CHANGE UP (ref 1.6–2.6)
MCHC RBC-ENTMCNC: 31.5 PG — SIGNIFICANT CHANGE UP (ref 27–34)
MCHC RBC-ENTMCNC: 32.8 GM/DL — SIGNIFICANT CHANGE UP (ref 32–36)
MCV RBC AUTO: 96.1 FL — SIGNIFICANT CHANGE UP (ref 80–100)
NRBC # BLD: 0 /100 WBCS — SIGNIFICANT CHANGE UP (ref 0–0)
PHOSPHATE SERPL-MCNC: 3 MG/DL — SIGNIFICANT CHANGE UP (ref 2.5–4.5)
PLATELET # BLD AUTO: 147 K/UL — LOW (ref 150–400)
POTASSIUM SERPL-MCNC: 3.9 MMOL/L — SIGNIFICANT CHANGE UP (ref 3.5–5.3)
POTASSIUM SERPL-SCNC: 3.9 MMOL/L — SIGNIFICANT CHANGE UP (ref 3.5–5.3)
RBC # BLD: 3.3 M/UL — LOW (ref 3.8–5.2)
RBC # FLD: 13.6 % — SIGNIFICANT CHANGE UP (ref 10.3–14.5)
SODIUM SERPL-SCNC: 143 MMOL/L — SIGNIFICANT CHANGE UP (ref 135–145)
WBC # BLD: 7.05 K/UL — SIGNIFICANT CHANGE UP (ref 3.8–10.5)
WBC # FLD AUTO: 7.05 K/UL — SIGNIFICANT CHANGE UP (ref 3.8–10.5)

## 2020-05-12 PROCEDURE — 99233 SBSQ HOSP IP/OBS HIGH 50: CPT

## 2020-05-12 PROCEDURE — 99291 CRITICAL CARE FIRST HOUR: CPT | Mod: 24

## 2020-05-12 PROCEDURE — 93306 TTE W/DOPPLER COMPLETE: CPT | Mod: 26

## 2020-05-12 PROCEDURE — 99292 CRITICAL CARE ADDL 30 MIN: CPT | Mod: 24

## 2020-05-12 RX ORDER — DEXAMETHASONE 0.5 MG/5ML
4 ELIXIR ORAL EVERY 8 HOURS
Refills: 0 | Status: COMPLETED | OUTPATIENT
Start: 2020-05-12 | End: 2020-05-13

## 2020-05-12 RX ORDER — DEXAMETHASONE 0.5 MG/5ML
ELIXIR ORAL
Refills: 0 | Status: COMPLETED | OUTPATIENT
Start: 2020-05-12 | End: 2020-05-17

## 2020-05-12 RX ORDER — SODIUM CHLORIDE 9 MG/ML
1000 INJECTION INTRAMUSCULAR; INTRAVENOUS; SUBCUTANEOUS
Refills: 0 | Status: DISCONTINUED | OUTPATIENT
Start: 2020-05-12 | End: 2020-05-13

## 2020-05-12 RX ORDER — LABETALOL HCL 100 MG
200 TABLET ORAL EVERY 8 HOURS
Refills: 0 | Status: DISCONTINUED | OUTPATIENT
Start: 2020-05-12 | End: 2020-05-12

## 2020-05-12 RX ORDER — POTASSIUM PHOSPHATE, MONOBASIC POTASSIUM PHOSPHATE, DIBASIC 236; 224 MG/ML; MG/ML
15 INJECTION, SOLUTION INTRAVENOUS ONCE
Refills: 0 | Status: COMPLETED | OUTPATIENT
Start: 2020-05-12 | End: 2020-05-12

## 2020-05-12 RX ORDER — DOXAZOSIN MESYLATE 4 MG
2 TABLET ORAL AT BEDTIME
Refills: 0 | Status: DISCONTINUED | OUTPATIENT
Start: 2020-05-12 | End: 2020-05-15

## 2020-05-12 RX ADMIN — Medication 4 MILLIGRAM(S): at 11:20

## 2020-05-12 RX ADMIN — PANTOPRAZOLE SODIUM 40 MILLIGRAM(S): 20 TABLET, DELAYED RELEASE ORAL at 04:49

## 2020-05-12 RX ADMIN — Medication 4 MILLIGRAM(S): at 04:49

## 2020-05-12 RX ADMIN — Medication 3 MILLILITER(S): at 23:13

## 2020-05-12 RX ADMIN — LEVETIRACETAM 500 MILLIGRAM(S): 250 TABLET, FILM COATED ORAL at 04:49

## 2020-05-12 RX ADMIN — CHLORHEXIDINE GLUCONATE 1 APPLICATION(S): 213 SOLUTION TOPICAL at 21:33

## 2020-05-12 RX ADMIN — Medication 3 MILLILITER(S): at 12:30

## 2020-05-12 RX ADMIN — SENNA PLUS 2 TABLET(S): 8.6 TABLET ORAL at 21:34

## 2020-05-12 RX ADMIN — Medication 2 MILLIGRAM(S): at 12:25

## 2020-05-12 RX ADMIN — Medication 3 MILLILITER(S): at 05:55

## 2020-05-12 RX ADMIN — LOSARTAN POTASSIUM 100 MILLIGRAM(S): 100 TABLET, FILM COATED ORAL at 04:48

## 2020-05-12 RX ADMIN — Medication 4 MILLIGRAM(S): at 17:45

## 2020-05-12 RX ADMIN — POLYETHYLENE GLYCOL 3350 17 GRAM(S): 17 POWDER, FOR SOLUTION ORAL at 04:48

## 2020-05-12 RX ADMIN — POTASSIUM PHOSPHATE, MONOBASIC POTASSIUM PHOSPHATE, DIBASIC 62.5 MILLIMOLE(S): 236; 224 INJECTION, SOLUTION INTRAVENOUS at 01:15

## 2020-05-12 RX ADMIN — LEVETIRACETAM 500 MILLIGRAM(S): 250 TABLET, FILM COATED ORAL at 17:43

## 2020-05-12 RX ADMIN — ENOXAPARIN SODIUM 40 MILLIGRAM(S): 100 INJECTION SUBCUTANEOUS at 17:43

## 2020-05-12 RX ADMIN — SIMVASTATIN 40 MILLIGRAM(S): 20 TABLET, FILM COATED ORAL at 21:34

## 2020-05-12 RX ADMIN — POLYETHYLENE GLYCOL 3350 17 GRAM(S): 17 POWDER, FOR SOLUTION ORAL at 17:43

## 2020-05-12 RX ADMIN — Medication 3 MILLILITER(S): at 17:54

## 2020-05-12 NOTE — PROGRESS NOTE ADULT - SUBJECTIVE AND OBJECTIVE BOX
Patient seen and examined at bedside.    --Anticoagulation--  enoxaparin Injectable 40 milliGRAM(s) SubCutaneous <User Schedule>    T(C): 36.9 (05-12-20 @ 03:00), Max: 37.5 (05-11-20 @ 19:00)  HR: 67 (05-12-20 @ 03:30) (54 - 116)  BP: 123/58 (05-11-20 @ 22:00) (123/58 - 137/60)  RR: 17 (05-12-20 @ 03:30) (12 - 40)  SpO2: 98% (05-12-20 @ 03:30) (93% - 100%)  Wt(kg): --    Exam:  EO to pain Following simple commands, PERRL, EOMI,    LUE 4/5  , LLE 3/5 RUE ext, RLE wds

## 2020-05-12 NOTE — PROGRESS NOTE ADULT - ASSESSMENT
s/p larry evacuation  - extubated yesterday  - s/s eval in AM  - Liberalize SBP  - MRI at some point  - started DVT ppx.

## 2020-05-12 NOTE — PROGRESS NOTE ADULT - ASSESSMENT
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, status post Priyanka evacuation, post-operative day 2    Neuro:   steroids for cerebral edema, taper per NSGY  levetiracetam per NSGY  delirium precautions  mobilize, PT/OT    Respiratory:   maintain SpO2 > 92%, on 3LNC  secretions: hypertonic nebs, chest PT    CV:  -160mmHg  hypertension: losartan, start labetalol if cannot wean nicardipine gtt  hyperlipemia: statin   TTE-p     GI:    speech and swallow, but likely will need a PEG  GI prophylaxis while on vent  start feeds this afternoon    Renal:   urinary retention, start doaxazosin, will also aid in BP management    Endocrine:   euglycemia   A1C 5.5     Heme/Onc:               DVT ppx: [] SQH [x] SQL [X] venodynes bilaterally  start chemoppx if CT head stable today    ID: afebrile     CODE STATUS:  [x] Full Code     DISPOSITION:  [x] ICU     [x] Patient is at high risk of neurologic deterioration/death due to: Large left basal ganglia hemorrhage, brain compression    Total critical care time 45 minutes 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, status post Priyanka evacuation, post-operative day 3    Neuro:   steroids for cerebral edema, taper per NSGY  levetiracetam per NSGY  delirium precautions  mobilize, PT/OT    Respiratory:   maintain SpO2 > 92%, on 3LNC  secretions: hypertonic nebs, chest PT    CV:  -160mmHg  hypertension: losartan, start labetalol if cannot wean nicardipine gtt  hyperlipemia: statin   TTE-p     GI:    speech and swallow, but likely will need a PEG  start feeds this afternoon if respiratory status stable    Renal:   urinary retention: start doaxazosin, will also aid in BP management    Endocrine:   euglycemia   A1C 5.5     Heme/Onc:               DVT ppx: [] SQH [x] SQL [X] venodynes bilaterally    ID: afebrile     CODE STATUS:  [x] Full Code     DISPOSITION:  [x] ICU     [x] Patient is at high risk of neurologic deterioration/death due to: Large left basal ganglia hemorrhage, brain compression    Total critical care time 45 minutes

## 2020-05-12 NOTE — PROGRESS NOTE ADULT - SUBJECTIVE AND OBJECTIVE BOX
SUMMARY:   87 year-old woman with history of hypertension and hyperlipidemia presented to [facility] on 5/9/20 with headache and syncopal episode. She was noted to be minimally responsive, plegic on the right and hypertensive to SBP 170s. She was intubated and stared on both propofol and nicardipine drips. 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 She was transferred to [facility] for further care.   Upon arrival to [facility], her exam was notable for eye opening, no command following, right arm localization, right leg withdrawal and purposeful left sided movements. After extensive discussion with the family regarding goals of care, she was brought to the operating room for emergent clot evacuation.    HOSPITAL COURSE:  5/9: left Priyanka clot evacuation  5/11: extubated    24 HOUR EVENTS:  +secretions    ADMISSION SCORES:  ICH Score: 3    VITALS/DATA/ORDERS: [x] Reviewed  nicardipine, NS+20mEq KCl @75cc/hr    DEVICES: [x] right radial a-line    PHYSICAL EXAM:  Neurological: eyes open, regards, does not follow commands, non-verbal, aphasic, pupils 2mm reactive, left gaze preference, right facial, +cough, left-side spontaneous, RUE localizes with transient antigravity effort, RLE weak wds  Cardiovascular: regular  Respiratory: coarse, no stridor, good air entry  Gastrointestinal: soft, non-distended, non-tender  Extremities: warm, dry  Incision/Wound: dressing C/D/I SUMMARY:   87 year-old woman with history of hypertension and hyperlipidemia presented to [facility] on 5/9/20 with headache and syncopal episode. She was noted to be minimally responsive, plegic on the right and hypertensive to SBP 170s. She was intubated and stared on both propofol and nicardipine drips. 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 She was transferred to [facility] for further care.   Upon arrival to [facility], her exam was notable for eye opening, no command following, right arm localization, right leg withdrawal and purposeful left sided movements. After extensive discussion with the family regarding goals of care, she was brought to the operating room for emergent clot evacuation.    HOSPITAL COURSE:  5/9: left Priyanka clot evacuation  5/11: extubated    24 HOUR EVENTS:  +secretions but saturating well     ADMISSION SCORES:  ICH Score: 3    VITALS/DATA/ORDERS: [x] Reviewed  nicardipine, NS+20mEq KCl @75cc/hr    DEVICES: [x] right radial a-line    PHYSICAL EXAM:  Neurological: eyes open, regards, does not follow commands, non-verbal, aphasic, pupils 2mm reactive, left gaze preference, right facial, +cough, left-side spontaneous, RUE localizes with transient antigravity effort, RLE weak wds  Cardiovascular: regular  Respiratory: coarse, no stridor, good air entry  Gastrointestinal: soft, non-distended, non-tender  Extremities: warm, dry  Incision/Wound: dressing c/d/i

## 2020-05-12 NOTE — PROGRESS NOTE ADULT - SUBJECTIVE AND OBJECTIVE BOX
SUBJECTIVE:   Patient found to have severe headache with subsequent syncopal episode. Found to be minimally responsive intubated for airway protection. Was HTN 170s, started on Caredene and prop gtt. Cardene discontinue prior to transfer. Upon arrival, patient unable to open her eyes, not following commands, moving spontaneously on the L, RUE loc, RLE WDs. After extensive discussion with the family regarding goals of care, she was brought to the operating room for emergent clot evacuation. Patient is s/p Left Priyanka for Intracerebral hemorrhage evacuation on 5/9/20. Extubated 5/11   OVERNIGHT EVENTS: none    Vital Signs Last 24 Hrs  T(C): 36.9 (12 May 2020 19:00), Max: 37 (12 May 2020 08:00)  T(F): 98.5 (12 May 2020 19:00), Max: 98.6 (12 May 2020 08:00)  HR: 82 (12 May 2020 21:00) (57 - 104)  BP: 132/76 (12 May 2020 06:30) (122/58 - 132/76)  BP(mean): 98 (12 May 2020 06:30) (83 - 98)  RR: 10 (12 May 2020 21:00) (10 - 47)  SpO2: 97% (12 May 2020 21:00) (93% - 100%)    PHYSICAL EXAM:    Neurological: eyes open, regards, does not follow commands, non-verbal, aphasic, pupils 2mm reactive, left gaze preference, right facial, +cough, LUE 5/5 spontaneous/ purposeful LLE 3/5 RUE withdraws RLE weak wds  Cardiovascular: regular  Respiratory: coarse, no stridor, good air entry  Gastrointestinal: soft, non-distended, non-tender  Extremities: warm, dry  Incision/Wound: dressing c/d/i    LABS:                        11.1   10.63 )-----------( 166      ( 11 May 2020 22:25 )             34.2    05-11    143  |  109<H>  |  22  ----------------------------<  147<H>  3.8   |  20<L>  |  0.57    Ca    9.0      11 May 2020 22:25  Phos  2.4     05-11  Mg     2.1     05-11 05-11 @ 07:01 - 05-12 @ 07:00  --------------------------------------------------------  IN: 2622.5 mL / OUT: 1525 mL / NET: 1097.5 mL          IMAGING:    No imaging today       MEDICATIONS:  Antibiotics:    Neuro:  acetaminophen   Tablet .. 650 milliGRAM(s) Oral every 6 hours PRN Temp greater or equal to 38C (100.4F), Mild Pain (1 - 3)  levETIRAcetam 500 milliGRAM(s) Oral two times a day  ondansetron Injectable 4 milliGRAM(s) IV Push every 6 hours PRN Nausea and/or Vomiting  oxyCODONE    Solution 5 milliGRAM(s) Oral every 4 hours PRN Moderate Pain (4 - 6)  oxyCODONE    Solution 10 milliGRAM(s) Oral every 4 hours PRN Severe Pain (7 - 10)  doxazosin 2 milliGRAM(s) Oral at bedtime  losartan 100 milliGRAM(s) Oral daily  albuterol/ipratropium for Nebulization 3 milliLiter(s) Nebulizer every 6 hours  polyethylene glycol 3350 17 Gram(s) Oral every 12 hours  senna 2 Tablet(s) Oral at bedtime  chlorhexidine 4% Liquid 1 Application(s) Topical <User Schedule>  dexAMETHasone     Tablet 4 milliGRAM(s) Oral every 6 hours  enoxaparin Injectable 40 milliGRAM(s) SubCutaneous <User Schedule>  simvastatin 40 milliGRAM(s) Oral at bedtime  sodium chloride 0.9%. 1000 milliLiter(s) IV Continuous <Continuous>      DIET:   Tube feeds via NGT

## 2020-05-12 NOTE — PROGRESS NOTE ADULT - ASSESSMENT
Left Basal ganglion heme, ICH score 3, s/p Priyanka 5/9/20. Extubated 5/11/20     Plan     Neuro: Neuro checks. Taper steroids   Resp- Chest PT, and suctioning prn . Maintain HOB >30 degree.   CV- HTN On Losartan   GI- Tube feeds Speech and swallow eval  - Urinary retention, Intermittent cath. renal function adequate   Endo Blood glucose <150mg/ dl   Heme/Onc DVT ppx: SQL   ID: afebrile     CODE STATUS:  [x] Full Code     DISPOSITION:  [x] ICU     [x] Patient is at high risk of neurologic deterioration/death due to: Large left basal ganglia hemorrhage, brain compression    Total critical care time 45 minute

## 2020-05-13 LAB
ALBUMIN SERPL ELPH-MCNC: 3.3 G/DL — SIGNIFICANT CHANGE UP (ref 3.3–5)
ALP SERPL-CCNC: 43 U/L — SIGNIFICANT CHANGE UP (ref 40–120)
ALT FLD-CCNC: 63 U/L — HIGH (ref 10–45)
ANION GAP SERPL CALC-SCNC: 12 MMOL/L — SIGNIFICANT CHANGE UP (ref 5–17)
AST SERPL-CCNC: 82 U/L — HIGH (ref 10–40)
BILIRUB SERPL-MCNC: 0.4 MG/DL — SIGNIFICANT CHANGE UP (ref 0.2–1.2)
BUN SERPL-MCNC: 29 MG/DL — HIGH (ref 7–23)
CALCIUM SERPL-MCNC: 8.9 MG/DL — SIGNIFICANT CHANGE UP (ref 8.4–10.5)
CHLORIDE SERPL-SCNC: 106 MMOL/L — SIGNIFICANT CHANGE UP (ref 96–108)
CO2 SERPL-SCNC: 20 MMOL/L — LOW (ref 22–31)
CREAT SERPL-MCNC: 0.55 MG/DL — SIGNIFICANT CHANGE UP (ref 0.5–1.3)
GLUCOSE SERPL-MCNC: 163 MG/DL — HIGH (ref 70–99)
HCT VFR BLD CALC: 33.5 % — LOW (ref 34.5–45)
HGB BLD-MCNC: 11.1 G/DL — LOW (ref 11.5–15.5)
MAGNESIUM SERPL-MCNC: 2 MG/DL — SIGNIFICANT CHANGE UP (ref 1.6–2.6)
MCHC RBC-ENTMCNC: 31.8 PG — SIGNIFICANT CHANGE UP (ref 27–34)
MCHC RBC-ENTMCNC: 33.1 GM/DL — SIGNIFICANT CHANGE UP (ref 32–36)
MCV RBC AUTO: 96 FL — SIGNIFICANT CHANGE UP (ref 80–100)
NRBC # BLD: 0 /100 WBCS — SIGNIFICANT CHANGE UP (ref 0–0)
PHOSPHATE SERPL-MCNC: 2.8 MG/DL — SIGNIFICANT CHANGE UP (ref 2.5–4.5)
PLATELET # BLD AUTO: 155 K/UL — SIGNIFICANT CHANGE UP (ref 150–400)
POTASSIUM SERPL-MCNC: 3.5 MMOL/L — SIGNIFICANT CHANGE UP (ref 3.5–5.3)
POTASSIUM SERPL-SCNC: 3.5 MMOL/L — SIGNIFICANT CHANGE UP (ref 3.5–5.3)
PROT SERPL-MCNC: 5.7 G/DL — LOW (ref 6–8.3)
RBC # BLD: 3.49 M/UL — LOW (ref 3.8–5.2)
RBC # FLD: 13.5 % — SIGNIFICANT CHANGE UP (ref 10.3–14.5)
SODIUM SERPL-SCNC: 138 MMOL/L — SIGNIFICANT CHANGE UP (ref 135–145)
WBC # BLD: 6.93 K/UL — SIGNIFICANT CHANGE UP (ref 3.8–10.5)
WBC # FLD AUTO: 6.93 K/UL — SIGNIFICANT CHANGE UP (ref 3.8–10.5)

## 2020-05-13 PROCEDURE — 99222 1ST HOSP IP/OBS MODERATE 55: CPT | Mod: GC

## 2020-05-13 PROCEDURE — 93010 ELECTROCARDIOGRAM REPORT: CPT

## 2020-05-13 PROCEDURE — 99233 SBSQ HOSP IP/OBS HIGH 50: CPT

## 2020-05-13 RX ORDER — LEVETIRACETAM 250 MG/1
500 TABLET, FILM COATED ORAL EVERY 12 HOURS
Refills: 0 | Status: DISCONTINUED | OUTPATIENT
Start: 2020-05-13 | End: 2020-05-19

## 2020-05-13 RX ORDER — DEXAMETHASONE 0.5 MG/5ML
4 ELIXIR ORAL EVERY 12 HOURS
Refills: 0 | Status: COMPLETED | OUTPATIENT
Start: 2020-05-14 | End: 2020-05-15

## 2020-05-13 RX ORDER — DILTIAZEM HCL 120 MG
10 CAPSULE, EXT RELEASE 24 HR ORAL ONCE
Refills: 0 | Status: COMPLETED | OUTPATIENT
Start: 2020-05-13 | End: 2020-05-13

## 2020-05-13 RX ORDER — DILTIAZEM HCL 120 MG
30 CAPSULE, EXT RELEASE 24 HR ORAL EVERY 6 HOURS
Refills: 0 | Status: DISCONTINUED | OUTPATIENT
Start: 2020-05-13 | End: 2020-05-14

## 2020-05-13 RX ORDER — POTASSIUM CHLORIDE 20 MEQ
40 PACKET (EA) ORAL ONCE
Refills: 0 | Status: COMPLETED | OUTPATIENT
Start: 2020-05-13 | End: 2020-05-13

## 2020-05-13 RX ORDER — METOPROLOL TARTRATE 50 MG
2.5 TABLET ORAL ONCE
Refills: 0 | Status: COMPLETED | OUTPATIENT
Start: 2020-05-13 | End: 2020-05-13

## 2020-05-13 RX ORDER — DEXAMETHASONE 0.5 MG/5ML
2 ELIXIR ORAL DAILY
Refills: 0 | Status: COMPLETED | OUTPATIENT
Start: 2020-05-16 | End: 2020-05-17

## 2020-05-13 RX ORDER — DEXAMETHASONE 0.5 MG/5ML
2 ELIXIR ORAL EVERY 12 HOURS
Refills: 0 | Status: COMPLETED | OUTPATIENT
Start: 2020-05-15 | End: 2020-05-16

## 2020-05-13 RX ORDER — DILTIAZEM HCL 120 MG
5 CAPSULE, EXT RELEASE 24 HR ORAL ONCE
Refills: 0 | Status: COMPLETED | OUTPATIENT
Start: 2020-05-13 | End: 2020-05-13

## 2020-05-13 RX ADMIN — Medication 30 MILLIGRAM(S): at 23:24

## 2020-05-13 RX ADMIN — Medication 2.5 MILLIGRAM(S): at 20:08

## 2020-05-13 RX ADMIN — ENOXAPARIN SODIUM 40 MILLIGRAM(S): 100 INJECTION SUBCUTANEOUS at 17:26

## 2020-05-13 RX ADMIN — Medication 10 MILLIGRAM(S): at 21:40

## 2020-05-13 RX ADMIN — Medication 3 MILLILITER(S): at 05:19

## 2020-05-13 RX ADMIN — Medication 4 MILLIGRAM(S): at 14:21

## 2020-05-13 RX ADMIN — Medication 40 MILLIEQUIVALENT(S): at 21:41

## 2020-05-13 RX ADMIN — POLYETHYLENE GLYCOL 3350 17 GRAM(S): 17 POWDER, FOR SOLUTION ORAL at 05:16

## 2020-05-13 RX ADMIN — Medication 4 MILLIGRAM(S): at 05:15

## 2020-05-13 RX ADMIN — SIMVASTATIN 40 MILLIGRAM(S): 20 TABLET, FILM COATED ORAL at 21:41

## 2020-05-13 RX ADMIN — Medication 3 MILLILITER(S): at 17:27

## 2020-05-13 RX ADMIN — Medication 3 MILLILITER(S): at 23:03

## 2020-05-13 RX ADMIN — LOSARTAN POTASSIUM 100 MILLIGRAM(S): 100 TABLET, FILM COATED ORAL at 05:15

## 2020-05-13 RX ADMIN — Medication 3 MILLILITER(S): at 11:28

## 2020-05-13 RX ADMIN — Medication 4 MILLIGRAM(S): at 21:40

## 2020-05-13 RX ADMIN — LEVETIRACETAM 400 MILLIGRAM(S): 250 TABLET, FILM COATED ORAL at 17:26

## 2020-05-13 RX ADMIN — Medication 4 MILLIGRAM(S): at 00:42

## 2020-05-13 RX ADMIN — Medication 2 MILLIGRAM(S): at 21:41

## 2020-05-13 RX ADMIN — LEVETIRACETAM 500 MILLIGRAM(S): 250 TABLET, FILM COATED ORAL at 05:15

## 2020-05-13 RX ADMIN — Medication 5 MILLIGRAM(S): at 20:28

## 2020-05-13 RX ADMIN — CHLORHEXIDINE GLUCONATE 1 APPLICATION(S): 213 SOLUTION TOPICAL at 20:29

## 2020-05-13 NOTE — PROGRESS NOTE ADULT - ASSESSMENT
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, status post Priyanka evacuation, post-operative day 3    Neuro:   steroids for cerebral edema, taper per NSGY  levetiracetam per NSGY  delirium precautions  mobilize, PT/OT    Respiratory:   maintain SpO2 > 92%, on 3LNC  secretions: hypertonic nebs, chest PT    CV:  -160mmHg  hypertension: losartan, start labetalol if cannot wean nicardipine gtt  hyperlipemia: statin   TTE-p     GI:    speech and swallow, but likely will need a PEG  start feeds this afternoon if respiratory status stable    Renal:   urinary retention: start doaxazosin, will also aid in BP management    Endocrine:   euglycemia   A1C 5.5     Heme/Onc:               DVT ppx: [] SQH [x] SQL [X] venodynes bilaterally    ID: afebrile     CODE STATUS:  [x] Full Code     DISPOSITION:  [x] ICU     [x] Patient is at high risk of neurologic deterioration/death due to: Large left basal ganglia hemorrhage, brain compression    Total critical care time 45 minutes 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, status post Priyanka evacuation, post-operative day 4    Neuro:   steroids for cerebral edema, taper per NSGY  levetiracetam per NSGY  delirium precautions  mobilize, PT/OT    Respiratory:   maintain SpO2 > 92%  secretions: hypertonic nebs, chest PT    CV:  -160mmHg  hypertension: losartan  hyperlipemia: statin   TTE-p     GI:    speech and swallow, but likely will need a PEG  tube feeds    Renal:   urinary retention: doxazosin    Endocrine:   euglycemia   A1C 5.5     Heme/Onc:               DVT ppx: [] SQH [x] SQL [X] venodynes bilaterally    ID: afebrile     CODE STATUS:  [x] Full Code

## 2020-05-13 NOTE — PROGRESS NOTE ADULT - SUBJECTIVE AND OBJECTIVE BOX
87F w/ PMH of HTN, HLD, transferred from [facility] for NSG eval after found to have large L. BG IPH.     S: S/P left larry clot evac    O:  Vital Signs Last 24 Hrs  T(C): 37.1 (13 May 2020 03:00), Max: 37.1 (12 May 2020 23:00)  T(F): 98.8 (13 May 2020 03:00), Max: 98.8 (13 May 2020 03:00)  HR: 84 (13 May 2020 08:00) (64 - 102)  BP: --  BP(mean): --  RR: 14 (13 May 2020 08:00) (10 - 47)  SpO2: 99% (13 May 2020 08:00) (95% - 100%)    MEDICATIONS  (STANDING):  albuterol/ipratropium for Nebulization 3 milliLiter(s) Nebulizer every 6 hours  chlorhexidine 4% Liquid 1 Application(s) Topical <User Schedule>  dexAMETHasone     Tablet 4 milliGRAM(s) Oral every 8 hours  dexAMETHasone     Tablet   Oral   doxazosin 2 milliGRAM(s) Oral at bedtime  enoxaparin Injectable 40 milliGRAM(s) SubCutaneous <User Schedule>  levETIRAcetam 500 milliGRAM(s) Oral two times a day  losartan 100 milliGRAM(s) Oral daily  polyethylene glycol 3350 17 Gram(s) Oral every 12 hours  senna 2 Tablet(s) Oral at bedtime  simvastatin 40 milliGRAM(s) Oral at bedtime  sodium chloride 0.9%. 1000 milliLiter(s) (75 mL/Hr) IV Continuous <Continuous>    MEDICATIONS  (PRN):  acetaminophen   Tablet .. 650 milliGRAM(s) Oral every 6 hours PRN Temp greater or equal to 38C (100.4F), Mild Pain (1 - 3)  ondansetron Injectable 4 milliGRAM(s) IV Push every 6 hours PRN Nausea and/or Vomiting  oxyCODONE    Solution 5 milliGRAM(s) Oral every 4 hours PRN Moderate Pain (4 - 6)  oxyCODONE    Solution 10 milliGRAM(s) Oral every 4 hours PRN Severe Pain (7 - 10)    Exam:  eyes open, not following commands, no verbal output  right hemiplegia with right facial droop, localizing pain on left    < from: CT Head No Cont (05.11.20 @ 10:27) >  IMPRESSION: No change in moderate residual parenchymal hemorrhage in the left frontal parenchyma and basal ganglia compared with 5/10/2020. No change in mass effect.    < end of copied text >

## 2020-05-13 NOTE — PROGRESS NOTE ADULT - ASSESSMENT
s/p larry evacuation  - Liberalize SBP  - MRI at some point  - started DVT ppx.   - TT stroke service in AM

## 2020-05-13 NOTE — SWALLOW BEDSIDE ASSESSMENT ADULT - SLP PERTINENT HISTORY OF CURRENT PROBLEM
PMH of HTN, HLD, transferred from [facility] for NSx eval for large L. BG IPH. Patient presented with severe headache with subsequent syncopal episode. Minimally responsive and intubated for airway protection. Was HTN 170s, started on Caredene and prop gtt. Cardene discontinued prior to transfer. Upon arrival, with sedation held she was unable to open her eyes, not following commands, moving spontaneously on the L, RUE loc, RLE WDs. After extensive discussion with the family regarding goals of care, Pt brought to OR for emergent clot evacuation.

## 2020-05-13 NOTE — PROGRESS NOTE ADULT - SUBJECTIVE AND OBJECTIVE BOX
SUBJECTIVE: HPI:  87F w/ PMH of HTN, HLD, transferred from [facility] for NSG eval after found to have large L. BG IPH.     Patient found to have severe headache with subsequent syncopal episode. Found to be minimally responsive and so intubated for airway protection. Was HTN 170s, started on Caredene and prop gtt. Cardene discontinue prior to transfer. Upon arrival, with sedation held she was unable to open her eyes, not following commands, moving spontaneously on the L, RUE loc, RLE WDs. After extensive discussion with the family regarding goals of care, she was brought to the operating room for emergent clot evacuation. (09 May 2020 20:05)      OVERNIGHT EVENTS:     Vital Signs Last 24 Hrs  T(C): 36.7 (13 May 2020 15:00), Max: 37.1 (12 May 2020 23:00)  T(F): 98 (13 May 2020 15:00), Max: 98.8 (13 May 2020 03:00)  HR: 86 (13 May 2020 18:00) (63 - 93)  BP: --  BP(mean): --  RR: 14 (13 May 2020 18:00) (10 - 24)  SpO2: 96% (13 May 2020 18:00) (95% - 100%)    PHYSICAL EXAM:    General: No Acute Distress     Neurological: Awake, alert oriented to person, place and time, Following Commands, PERRL, EOMI, Face Symmetrical, Speech Fluent, Moving all extremities, Muscle Strength normal in all four extremities, No Drift, Sensation to Light Touch Intact    Pulmonary: Clear to Auscultation, No Rales, No Rhonchi, No Wheezes     Cardiovascular: S1, S2, Regular Rate and Rhythm     Gastrointestinal: Soft, Nontender, Nondistended     Incision:     LABS:                        10.4   7.05  )-----------( 147      ( 12 May 2020 22:37 )             31.7    05-12    143  |  111<H>  |  24<H>  ----------------------------<  146<H>  3.9   |  20<L>  |  0.55    Ca    8.7      12 May 2020 22:37  Phos  3.0     05-12  Mg     2.1     05-12 05-12 @ 07:01 - 05-13 @ 07:00  --------------------------------------------------------  IN: 1445 mL / OUT: 850 mL / NET: 595 mL    05-13 @ 07:01 - 05-13 @ 19:36  --------------------------------------------------------  IN: 1595 mL / OUT: 900 mL / NET: 695 mL      DRAINS:     MEDICATIONS:  Antibiotics:    Neuro:  acetaminophen   Tablet .. 650 milliGRAM(s) Oral every 6 hours PRN Temp greater or equal to 38C (100.4F), Mild Pain (1 - 3)  levETIRAcetam  IVPB 500 milliGRAM(s) IV Intermittent every 12 hours  ondansetron Injectable 4 milliGRAM(s) IV Push every 6 hours PRN Nausea and/or Vomiting  oxyCODONE    Solution 5 milliGRAM(s) Oral every 4 hours PRN Moderate Pain (4 - 6)  oxyCODONE    Solution 10 milliGRAM(s) Oral every 4 hours PRN Severe Pain (7 - 10)    Cardiac:  doxazosin 2 milliGRAM(s) Oral at bedtime  losartan 100 milliGRAM(s) Oral daily    Pulm:  albuterol/ipratropium for Nebulization 3 milliLiter(s) Nebulizer every 6 hours    GI/:  polyethylene glycol 3350 17 Gram(s) Oral every 12 hours  senna 2 Tablet(s) Oral at bedtime    Other:   chlorhexidine 4% Liquid 1 Application(s) Topical <User Schedule>  dexAMETHasone     Tablet 4 milliGRAM(s) Oral every 8 hours  dexAMETHasone     Tablet   Oral   enoxaparin Injectable 40 milliGRAM(s) SubCutaneous <User Schedule>  simvastatin 40 milliGRAM(s) Oral at bedtime  sodium chloride 0.9%. 1000 milliLiter(s) IV Continuous <Continuous>    DIET: [] Regular [] CCD [] Renal [] Puree [] Dysphagia [] Tube Feeds:     IMAGING: SUBJECTIVE: HPI:  87F w/ PMH of HTN, HLD, transferred from [facility] for NSG eval after found to have large L. BG IPH.     Patient found to have severe headache with subsequent syncopal episode. Found to be minimally responsive and so intubated for airway protection. Was HTN 170s, started on Caredene and prop gtt. Cardene discontinue prior to transfer. Upon arrival, with sedation held she was unable to open her eyes, not following commands, moving spontaneously on the L, RUE loc, RLE WDs. After extensive discussion with the family regarding goals of care, she was brought to the operating room for emergent clot evacuation. (09 May 2020 20:05)    INTERVAL EVENTS: During change of shift, HR noted to be in the 120s-150s, EKG shows afib with RVR, given Lopressor 5 IVPx1, Cardizem 10IVP x 1, started on Cardizem 30 q 6.     Vital Signs Last 24 Hrs  T(C): 36.7 (13 May 2020 15:00), Max: 37.1 (12 May 2020 23:00)  T(F): 98 (13 May 2020 15:00), Max: 98.8 (13 May 2020 03:00)  HR: 86 (13 May 2020 18:00) (63 - 93)  BP: --  BP(mean): --  RR: 14 (13 May 2020 18:00) (10 - 24)  SpO2: 96% (13 May 2020 18:00) (95% - 100%)    PHYSICAL EXAM:    General: No Acute Distress     Neurological: Awake, EO spontaneously, pupils 2mm react b/l with left gaze preference, aphasic, does not follow commands but able to mimic on the left side, LUE/LLE 3/5, RUE ext to nox, RLE WDs    Pulmonary: Clear to Auscultation, No Rales, No Rhonchi, No Wheezes     Cardiovascular: S1, S2, Regular Rate and Rhythm     Gastrointestinal: Soft, Nontender, Nondistended     Incision: Crani incision dressing C/D/I    LABS:                        10.4   7.05  )-----------( 147      ( 12 May 2020 22:37 )             31.7    05-12    143  |  111<H>  |  24<H>  ----------------------------<  146<H>  3.9   |  20<L>  |  0.55    Ca    8.7      12 May 2020 22:37  Phos  3.0     05-12  Mg     2.1     05-12 05-12 @ 07:01 - 05-13 @ 07:00  --------------------------------------------------------  IN: 1445 mL / OUT: 850 mL / NET: 595 mL    05-13 @ 07:01 - 05-13 @ 19:36  --------------------------------------------------------  IN: 1595 mL / OUT: 900 mL / NET: 695 mL      DRAINS: None    MEDICATIONS:  Antibiotics:    Neuro:  acetaminophen   Tablet .. 650 milliGRAM(s) Oral every 6 hours PRN Temp greater or equal to 38C (100.4F), Mild Pain (1 - 3)  levETIRAcetam  IVPB 500 milliGRAM(s) IV Intermittent every 12 hours  ondansetron Injectable 4 milliGRAM(s) IV Push every 6 hours PRN Nausea and/or Vomiting  oxyCODONE    Solution 5 milliGRAM(s) Oral every 4 hours PRN Moderate Pain (4 - 6)  oxyCODONE    Solution 10 milliGRAM(s) Oral every 4 hours PRN Severe Pain (7 - 10)    Cardiac:  doxazosin 2 milliGRAM(s) Oral at bedtime  losartan 100 milliGRAM(s) Oral daily    Pulm:  albuterol/ipratropium for Nebulization 3 milliLiter(s) Nebulizer every 6 hours    GI/:  polyethylene glycol 3350 17 Gram(s) Oral every 12 hours  senna 2 Tablet(s) Oral at bedtime    Other:   chlorhexidine 4% Liquid 1 Application(s) Topical <User Schedule>  dexAMETHasone     Tablet 4 milliGRAM(s) Oral every 8 hours  dexAMETHasone     Tablet   Oral   enoxaparin Injectable 40 milliGRAM(s) SubCutaneous <User Schedule>  simvastatin 40 milliGRAM(s) Oral at bedtime  sodium chloride 0.9%. 1000 milliLiter(s) IV Continuous <Continuous>    DIET: [] Regular [] CCD [] Renal [] Puree [] Dysphagia [x] Tube Feeds: Jevity goal 55    IMAGING:   < from: CT Head No Cont (05.11.20 @ 10:27) >  IMPRESSION: No change in moderate residual parenchymal hemorrhage in the left frontal parenchyma and basal ganglia compared with 5/10/2020. No change in mass effect.    KENTRELL ANAYA MD, ATTENDING RADIOLOGIST  This document has been electronically signed. May 11 2020 10:32AM    < end of copied text >

## 2020-05-13 NOTE — PROGRESS NOTE ADULT - ASSESSMENT
87F w/ PMH of HTN, HLD, with large BG IPH, etiology unknown. S/P larry clot removal     Plan:   [] OK with starting DVT proph  [] MRI brain w/w/o contrast to rule out underlying vascular or mass lesion   [] MRA head w/o contrast; MRA neck w/ contrast  [] Continue statin for secondary stroke prevention (has previous chronic lacunes)

## 2020-05-13 NOTE — CONSULT NOTE ADULT - ATTENDING COMMENTS
Seen and examined with resident. Agree with note.   Currently low functioning and cant tolerate 3h/d of therapies- recommend subacute rehabilitation when stable.
VASCULAR NEUROLOGY ATTENDING  The patients imaging is reviewed. I agree with the resident note unless otherwise noted. Continued care per NSCU.

## 2020-05-13 NOTE — PROGRESS NOTE ADULT - ASSESSMENT
ASSESSMENT AND PLAN:     NEURO:     PULM:     CV:    ENDO:     HEME/ONC:                      DVT ppx:     RENAL:     ID:     GI:      DISCHARGE PLANNING: ASSESSMENT AND PLAN: 84F, with medical history of HTN, HLD, p/w HA & syncopal episode, hypertensive and found to be plegic right side, found to have a large left basal ganglia heme ICH 3, s/p lt larry evacuation of ICH POD 4    NEURO:   - Continue Stroke Neuro checks q 2  - Continue Keppra for seizure ppx  - Continue Decadron taper for cerebral edema per Neurosurgery  - Continue pain control w/ tylenol prn and oxycodone prn  - Stroke Neurology following   - PM&R: GINA    PULM:   - Continue duonebs prn   - Currently 2L NC, O2Sat>96  - Encourage incentive spirometer if able to    CV:  - New onset Afib with RVR tonight, received Lopressor 5mg IVP x 1, Cardizem 10mg IVP x 1, started on Cardizem 30mg q 6, consult Cardiology in am  - Continue Losartan for HTN  - Continue Simvastatin for HLD  - TTE 5/12: EF 65-70%, moderate aortic stenosis, mild aortic regurg, concentric left vent remodeling, hyperdynamic lt vent systolic fxn, stage II modr diastolic fxn, normal rt vent fxn, borderline pulmonary hypertension     ENDO:   - HgbA1c 5.5, goal euglycemia, monitor glucose while on steroids    HEME/ONC:             CBC reviewed stable         DVT ppx: SCDs, SQL, 5/10 [place] Dopps negative DVT    RENAL:   - IVL  - BMP reviewed, K3.5, supplemented KCl 40x1    ID:   - Afebrile  - COVID negative    GI:    - Continue NGT / tube feeds, may need PEG at some point, S&S following  - Continue senna and miralax for bowel regimen    :  - Continue Cardura for urinary retention, straight cath as needed    MISC:    Code Status: Full Code    DISPO: ICU    Time Spent: 35 mins    Patient is at high risk of neurologic deterioration/death due to: Large left basal ganglia hemorrhage, brain compression    Case to be discussed w/ Dr. Hyatt ASSESSMENT AND PLAN: 84F, with medical history of HTN, HLD, p/w HA & syncopal episode, hypertensive and found to be plegic right side, found to have a large left basal ganglia heme ICH 3, s/p lt larry evacuation of ICH POD 4    NEURO:   - Continue Stroke Neuro checks q 2  - Continue Keppra for seizure ppx  - Continue Decadron taper for cerebral edema per Neurosurgery  - Continue pain control w/ tylenol prn and oxycodone prn  - Stroke Neurology following   - PM&R: GINA    PULM:   - Continue duonebs prn   - Currently 2L NC, O2Sat>96  - Encourage incentive spirometer if able to    CV:  - New onset Afib with RVR tonight, received Lopressor 5mg IVP x 1, Cardizem 10mg IVP x 1, started on Cardizem 30mg q 6, consult Cardiology in am  - Continue Losartan for HTN  - Continue Simvastatin for HLD  - TTE 5/12: EF 65-70%, moderate aortic stenosis, mild aortic regurg, concentric left vent remodeling, hyperdynamic lt vent systolic fxn, stage II modr diastolic fxn, normal rt vent fxn, borderline pulmonary hypertension     ENDO:   - HgbA1c 5.5, goal euglycemia, monitor glucose while on steroids    HEME/ONC:             CBC reviewed stable         DVT ppx: SCDs, SQL, 5/10 Le Dopps negative DVT    RENAL:   - IVL  - BMP reviewed, K3.5, supplemented KCl 40x1    ID:   - Afebrile  - COVID negative    GI:    - Continue NGT / tube feeds, may need PEG at some point, S&S following  - Continue senna and miralax for bowel regimen  - LFTs mildly elevated - trend with next set of labs    :  - Continue Cardura for urinary retention, straight cath as needed    MISC:    Code Status: Full Code    DISPO: ICU    Time Spent: 35 mins    Patient is at high risk of neurologic deterioration/death due to: Large left basal ganglia hemorrhage, brain compression    Case to be discussed w/ Dr. Hyatt

## 2020-05-13 NOTE — CONSULT NOTE ADULT - SUBJECTIVE AND OBJECTIVE BOX
HPI:  87 year-old woman with history of hypertension and hyperlipidemia presented to [facility] on 5/9/20 with headache and syncopal episode. She was noted to be minimally responsive, plegic on the right and hypertensive to SBP 170s. She was intubated and stared on both propofol and nicardipine drips. 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 She was transferred to [facility] on 5/9 for further care.     Upon arrival to [facility], her exam was notable for eye opening, no command following, right arm localization, right leg withdrawal and purposeful left sided movements. After extensive discussion with the family regarding goals of care, she was brought to the operating room for emergent clot evacuation. CTA head with no aneurysm or AVMs found.     Patient is s/p L larry evacuation of IPH by neurosurgery on 5/9. Extubated on 5/11. On decadron and being tapered for cerebral edema. On keppra for seizure ppx. Started on doxazosin for urinary retention. CTH done 5/11 with stability and started on lovenox for DVT ppx. Patient currently on tube feeds with possible need for PEG? Currently MRI brain pending.      REVIEW OF SYSTEMS  Constitutional - No fever, No fatigue  HEENT - No visual disturbances, No difficulty hearing,  No neck pain  Respiratory - No cough, No wheezing, No shortness of breath  Cardiovascular - No chest pain, No palpitations  Gastrointestinal - No abdominal pain, No nausea, No vomiting, No diarrhea, No constipation  Genitourinary - No dysuria, No frequency, No hematuria, No incontinence  Neurological - No headaches, No loss of strength, No numbness  Skin - No itching, No rashes  Endocrine - No temperature intolerance  Musculoskeletal - No joint pain, No joint swelling, No muscle pain  Psychiatric - No depression, No anxiety  All other review of systems negative    PAST MEDICAL & SURGICAL HISTORY  Nontraumatic intracerebral hemorrhage  Handoff  HTN (hypertension)  Intracranial hematoma  Intracranial hematoma  Intraparenchymal hemorrhage of brain  Evacuation, hematoma, intracranial, endoscopic  TRANSFER HEAD BLEED  RAD CDS      SOCIAL HISTORY  Smoking - Denied  EtOH - Denied   Drugs - Denied    FUNCTIONAL HISTORY  As per CM note: Tajik and English speaking, , , Hinduism, female residing with spouse in a private home in [facility]. Son reports there are two entrances to the main floor of the home- one with 6 steps and one with 2 steps. He also reports a full flight of stairs to the bedroom. He reports if needed, they can convert a sewing room into a bedroom for patient on the main floor and then patient would only have 2 steps outside of the home. Patient independent prior to admission, no DME, and no skilled services. Patient drive herself. Son reports spouse is 86yo and lost without patient around him, but independent and family is checking in on him.   Independent in ambulation, ADL's, transfers prior to hospitalization    CURRENT FUNCTIONAL STATUS  Bed mobility - Max A   Transfers - TBA  Gait - TBA  ADL's - Grooming and UBD Max A     FAMILY HISTORY   Reviewed and non-contributory    ALLERGIES  No Known Allergies    VITALS  T(C): 37 (05-13-20 @ 07:00)  T(F): 98.6 (05-13-20 @ 07:00), Max: 98.8 (05-13-20 @ 03:00)  HR: 78 (05-13-20 @ 11:29) (64 - 102)  BP: --  RR:  (10 - 47)  SpO2:  (95% - 100%)  Wt(kg): --    PHYSICAL EXAM  Constitutional - NAD, Comfortable  HEENT - NCAT, EOMI  Neck - Supple  Chest - CTA bilaterally  Cardiovascular - RRR, S1S2  Abdomen - BS+, Soft, NTND  Extremities - No C/C/E, No calf tenderness   Neurologic Exam -                    Cognitive - Awake, Alert, AAO to self, place, date, year, situation     Communication - Fluent, No dysarthria     Cranial Nerves - CN 2-12 intact     Motor -                     LEFT    UE - ShAB 5/5, EF 5/5, EE 5/5, WE 5/5,  5/5                    RIGHT UE - ShAB 5/5, EF 5/5, EE 5/5, WE 5/5,  5/5                    LEFT    LE - HF 5/5, KE 5/5, DF 5/5, PF 5/5                    RIGHT LE - HF 5/5, KE 5/5, DF 5/5, PF 5/5        Sensory - Intact to light touch     Reflexes - DTR Intact, No primitive reflexive     Coordination - FTN intact     OculoVestibular - No saccades, No nystagmus, VOR         Balance - WNL Static  Psychiatric - Affect WNL    RECENT LABS/IMAGING                        10.4   7.05  )-----------( 147      ( 12 May 2020 22:37 )             31.7     05-12    143  |  111<H>  |  24<H>  ----------------------------<  146<H>  3.9   |  20<L>  |  0.55    Ca    8.7      12 May 2020 22:37  Phos  3.0     05-12  Mg     2.1     05-12    < from: CT Head No Cont (05.11.20 @ 10:27) >  IMPRESSION: No change in moderate residual parenchymal hemorrhage in the left frontal parenchyma and basal ganglia compared with 5/10/2020. No change in mass effect.    < end of copied text >      < from: [facility] Duplex Lower Ext Vein Scan, Bilat (05.10.20 @ 17:06) >  IMPRESSION:     No evidence of deep venous thrombosis in either lower extremity.    < end of copied text >    < from: Transthoracic Echocardiogram (05.12.20 @ 17:19) >  Conclusions:  1. Calcified aortic valve with decreased opening. Peak  transaortic valve gradient equals 34 mm Hg, mean  transaortic valve gradient equals 18 mm Hg, estimated  aortic valve area equals 1.3 sqcm (by continuity equation),  aortic valve velocity time integral equals 62 cm,  consistent with moderate aortic stenosis. Dimensionless  index = 0.42, consistent with moderate aortic stenosis.  Mild aortic regurgitation.  2. Increased relative wall thickness with normal left  ventricular mass index, consistent with concentric left  ventricular remodeling.  3. Hyperdynamic left ventricular systolic function.  4. Moderate diastolic dysfunction (Stage II).  5. The right ventricle is not well visualized; grossly  normal right ventricular systolic function.  6. Estimated right ventricular systolic pressure equals 37  mm Hg, assuming right atrial pressure equals 8 mm Hg,  consistent with borderline pulmonary hypertension.  *** No previous Echo exam.    < end of copied text >  < from: Transthoracic Echocardiogram (05.12.20 @ 17:19) >  Conclusions:  1. Calcified aortic valve with decreased opening. Peak  transaortic valve gradient equals 34 mm Hg, mean  transaortic valve gradient equals 18 mm Hg, estimated  aortic valve area equals 1.3 sqcm (by continuity equation),  aortic valve velocity time integral equals 62 cm,  consistent with moderate aortic stenosis. Dimensionless  index = 0.42, consistent with moderate aortic stenosis.  Mild aortic regurgitation.  2. Increased relative wall thickness with normal left  ventricular mass index, consistent with concentric left  ventricular remodeling.  3. Hyperdynamic left ventricular systolic function.  4. Moderate diastolic dysfunction (Stage II).  5. The right ventricle is not well visualized; grossly  normal right ventricular systolic function.  6. Estimated right ventricular systolic pressure equals 37  mm Hg, assuming right atrial pressure equals 8 mm Hg,  consistent with borderline pulmonary hypertension.  *** No previous Echo exam.    < end of copied text >      COVID-19 PCR . (05.09.20 @ 14:12)    COVID-19 PCR: NotDetec: This test has been validated by [employer] to be accurate;  though it has not been FDA cleared/approved by the usual pathway.  As with all laboratory tests, results should be correlated with clinical  findings.  https://www.fda.gov/media/989859/download  https://www.fda.gov/media/446384/download      MEDICATIONS   MEDICATIONS  (STANDING):  albuterol/ipratropium for Nebulization 3 milliLiter(s) Nebulizer every 6 hours  chlorhexidine 4% Liquid 1 Application(s) Topical <User Schedule>  dexAMETHasone     Tablet 4 milliGRAM(s) Oral every 8 hours  dexAMETHasone     Tablet   Oral   doxazosin 2 milliGRAM(s) Oral at bedtime  enoxaparin Injectable 40 milliGRAM(s) SubCutaneous <User Schedule>  levETIRAcetam 500 milliGRAM(s) Oral two times a day  losartan 100 milliGRAM(s) Oral daily  polyethylene glycol 3350 17 Gram(s) Oral every 12 hours  senna 2 Tablet(s) Oral at bedtime  simvastatin 40 milliGRAM(s) Oral at bedtime  sodium chloride 0.9%. 1000 milliLiter(s) (75 mL/Hr) IV Continuous <Continuous>    MEDICATIONS  (PRN):  acetaminophen   Tablet .. 650 milliGRAM(s) Oral every 6 hours PRN Temp greater or equal to 38C (100.4F), Mild Pain (1 - 3)  ondansetron Injectable 4 milliGRAM(s) IV Push every 6 hours PRN Nausea and/or Vomiting  oxyCODONE    Solution 5 milliGRAM(s) Oral every 4 hours PRN Moderate Pain (4 - 6)  oxyCODONE    Solution 10 milliGRAM(s) Oral every 4 hours PRN Severe Pain (7 - 10)      ASSESSMENT/PLAN  87 year-old woman with history of hypertension and hyperlipidemia presented with large left BG hemorrhage s/p left larry evacuation Now with functional, gait, ADL, impairments.    ***DISPO RECS NOT FINALIZED YET UNTIL ATTENDING ROUNDS COMPLETED****      PT - ROM, Bed Mob, Transfers, Amb with AD   OT - ADLs, ROM  SLP - Dysphagia eval and treat  Precautions - Falls, Cardiac    DVT Prophylaxis - Lovenox   Weight bearing status - WBAT  Skin - Turn Q2hrs  Diet - NPO tube feeds    Disposition recommendation-  Recommend ACUTE inpatient rehabilitation for the functional deficits consisting of 3 hours of therapy/day & 24 hour RN/daily PMR physician for comorbid medical management. Will continue to follow for ongoing rehab needs and recommendations. HPI:  87 year-old woman with history of hypertension and hyperlipidemia presented to [facility] on 5/9/20 with headache and syncopal episode. She was noted to be minimally responsive, plegic on the right and hypertensive to SBP 170s. She was intubated and stared on both propofol and nicardipine drips. 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 She was transferred to [facility] on 5/9 for further care.     Upon arrival to [facility], her exam was notable for eye opening, no command following, right arm localization, right leg withdrawal and purposeful left sided movements. After extensive discussion with the family regarding goals of care, she was brought to the operating room for emergent clot evacuation. CTA head with no aneurysm or AVMs found.     Patient is s/p L larry evacuation of IPH by neurosurgery on 5/9. Extubated on 5/11. On decadron and being tapered for cerebral edema. On keppra for seizure ppx. Started on doxazosin for urinary retention. CTH done 5/11 with stability and started on lovenox for DVT ppx. Patient currently on tube feeds with possible need for PEG? Currently MRI brain pending.      REVIEW OF SYSTEMS  Non verbal at this time, unable to elicit ROS.     PAST MEDICAL & SURGICAL HISTORY  Nontraumatic intracerebral hemorrhage  Handoff  HTN (hypertension)  Intracranial hematoma  Intracranial hematoma  Intraparenchymal hemorrhage of brain  Evacuation, hematoma, intracranial, endoscopic  TRANSFER HEAD BLEED  RAD CDS      SOCIAL HISTORY  Smoking - No known hx   EtOH - No known hx   Drugs - No known hx     FUNCTIONAL HISTORY  As per CM note: Venezuelan and English speaking, , , Yars[place], female residing with spouse in a private home in [facility]. Son reports there are two entrances to the main floor of the home- one with 6 steps and one with 2 steps. He also reports a full flight of stairs to the bedroom. He reports if needed, they can convert a sewing room into a bedroom for patient on the main floor and then patient would only have 2 steps outside of the home. Patient independent prior to admission, no DME, and no skilled services. Patient drive herself. Son reports spouse is 88yo and lost without patient around him, but independent and family is checking in on him.   Independent in ambulation, ADL's, transfers prior to hospitalization    CURRENT FUNCTIONAL STATUS  Bed mobility - Max A   Transfers - TBA  Gait - TBA  ADL's - Grooming and UBD Max A     FAMILY HISTORY   Reviewed and non-contributory    ALLERGIES  No Known Allergies    VITALS  T(C): 37 (05-13-20 @ 07:00)  T(F): 98.6 (05-13-20 @ 07:00), Max: 98.8 (05-13-20 @ 03:00)  HR: 78 (05-13-20 @ 11:29) (64 - 102)  BP: --  RR:  (10 - 47)  SpO2:  (95% - 100%)  Wt(kg): --    PHYSICAL EXAM  Constitutional - NAD, Comfortable   HEENT - +s/p L evacuation, incision dressing c/d/i, +NGT   Neck - Supple  Chest - CTA bilaterally  Cardiovascular - RRR, S1S2  Abdomen - BS+, Soft, NTND  Extremities - No C/C/E, No calf tenderness   Neurologic Exam -                    Cognitive - Nonverbal, not following commands, will open eyes when spoken to      Communication - Non fluent, not following commands      Cranial Nerves - Left ptosis noted, not tracking finger movements      Motor -  Non cooperative, currently restrained on bothe UE, able to spotanesouly move bilateral forearm, with weak  noted on the left, no  on the right, Able to wiggle toes on the LLE but unable to move RLE.   Psychiatric - Flat affect     RECENT LABS/IMAGING                        10.4   7.05  )-----------( 147      ( 12 May 2020 22:37 )             31.7     05-12    143  |  111<H>  |  24<H>  ----------------------------<  146<H>  3.9   |  20<L>  |  0.55    Ca    8.7      12 May 2020 22:37  Phos  3.0     05-12  Mg     2.1     05-12    < from: CT Head No Cont (05.11.20 @ 10:27) >  IMPRESSION: No change in moderate residual parenchymal hemorrhage in the left frontal parenchyma and basal ganglia compared with 5/10/2020. No change in mass effect.    < end of copied text >      < from: VA Duplex Lower Ext Vein Scan, Bilat (05.10.20 @ 17:06) >  IMPRESSION:     No evidence of deep venous thrombosis in either lower extremity.    < end of copied text >    < from: Transthoracic Echocardiogram (05.12.20 @ 17:19) >  Conclusions:  1. Calcified aortic valve with decreased opening. Peak  transaortic valve gradient equals 34 mm Hg, mean  transaortic valve gradient equals 18 mm Hg, estimated  aortic valve area equals 1.3 sqcm (by continuity equation),  aortic valve velocity time integral equals 62 cm,  consistent with moderate aortic stenosis. Dimensionless  index = 0.42, consistent with moderate aortic stenosis.  Mild aortic regurgitation.  2. Increased relative wall thickness with normal left  ventricular mass index, consistent with concentric left  ventricular remodeling.  3. Hyperdynamic left ventricular systolic function.  4. Moderate diastolic dysfunction (Stage II).  5. The right ventricle is not well visualized; grossly  normal right ventricular systolic function.  6. Estimated right ventricular systolic pressure equals 37  mm Hg, assuming right atrial pressure equals 8 mm Hg,  consistent with borderline pulmonary hypertension.  *** No previous Echo exam.    < end of copied text >  < from: Transthoracic Echocardiogram (05.12.20 @ 17:19) >  Conclusions:  1. Calcified aortic valve with decreased opening. Peak  transaortic valve gradient equals 34 mm Hg, mean  transaortic valve gradient equals 18 mm Hg, estimated  aortic valve area equals 1.3 sqcm (by continuity equation),  aortic valve velocity time integral equals 62 cm,  consistent with moderate aortic stenosis. Dimensionless  index = 0.42, consistent with moderate aortic stenosis.  Mild aortic regurgitation.  2. Increased relative wall thickness with normal left  ventricular mass index, consistent with concentric left  ventricular remodeling.  3. Hyperdynamic left ventricular systolic function.  4. Moderate diastolic dysfunction (Stage II).  5. The right ventricle is not well visualized; grossly  normal right ventricular systolic function.  6. Estimated right ventricular systolic pressure equals 37  mm Hg, assuming right atrial pressure equals 8 mm Hg,  consistent with borderline pulmonary hypertension.  *** No previous Echo exam.    < end of copied text >      COVID-19 PCR . (05.09.20 @ 14:12)    COVID-19 PCR: NotDetec: This test has been validated by [employer] to be accurate;  though it has not been FDA cleared/approved by the usual pathway.  As with all laboratory tests, results should be correlated with clinical  findings.  https://www.fda.gov/media/084041/download  https://www.fda.gov/media/695056/download      MEDICATIONS   MEDICATIONS  (STANDING):  albuterol/ipratropium for Nebulization 3 milliLiter(s) Nebulizer every 6 hours  chlorhexidine 4% Liquid 1 Application(s) Topical <User Schedule>  dexAMETHasone     Tablet 4 milliGRAM(s) Oral every 8 hours  dexAMETHasone     Tablet   Oral   doxazosin 2 milliGRAM(s) Oral at bedtime  enoxaparin Injectable 40 milliGRAM(s) SubCutaneous <User Schedule>  levETIRAcetam 500 milliGRAM(s) Oral two times a day  losartan 100 milliGRAM(s) Oral daily  polyethylene glycol 3350 17 Gram(s) Oral every 12 hours  senna 2 Tablet(s) Oral at bedtime  simvastatin 40 milliGRAM(s) Oral at bedtime  sodium chloride 0.9%. 1000 milliLiter(s) (75 mL/Hr) IV Continuous <Continuous>    MEDICATIONS  (PRN):  acetaminophen   Tablet .. 650 milliGRAM(s) Oral every 6 hours PRN Temp greater or equal to 38C (100.4F), Mild Pain (1 - 3)  ondansetron Injectable 4 milliGRAM(s) IV Push every 6 hours PRN Nausea and/or Vomiting  oxyCODONE    Solution 5 milliGRAM(s) Oral every 4 hours PRN Moderate Pain (4 - 6)  oxyCODONE    Solution 10 milliGRAM(s) Oral every 4 hours PRN Severe Pain (7 - 10)      ASSESSMENT/PLAN  87 year-old woman with history of hypertension and hyperlipidemia presented with large left BG hemorrhage s/p left larry evacuation Now with functional, gait, ADL, impairments.    PT - ROM, Bed Mob, Transfers, Amb with AD   OT - ADLs, ROM  SLP - Dysphagia eval and treat  Precautions - Falls, Cardiac    DVT Prophylaxis - Lovenox   Weight bearing status - WBAT  Skin - Turn Q2hrs  Diet - NPO tube feeds    Disposition recommendation-  Recommend [facility], patient DOES NOT meet acute inpatient rehabilitation criteria. Will continue to follow up as rehab needs and recs can change depending on progression while in therapy.

## 2020-05-13 NOTE — PROGRESS NOTE ADULT - SUBJECTIVE AND OBJECTIVE BOX
SUMMARY:   87 year-old woman with history of hypertension and hyperlipidemia presented to [facility] on 5/9/20 with headache and syncopal episode. She was noted to be minimally responsive, plegic on the right and hypertensive to SBP 170s. She was intubated and stared on both propofol and nicardipine drips. 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 She was transferred to [facility] for further care.   Upon arrival to [facility], her exam was notable for eye opening, no command following, right arm localization, right leg withdrawal and purposeful left sided movements. After extensive discussion with the family regarding goals of care, she was brought to the operating room for emergent clot evacuation.    HOSPITAL COURSE:  5/9: left Priyanka clot evacuation  5/11: extubated    24 HOUR EVENTS:  +secretions but saturating well     ADMISSION SCORES:  ICH Score: 3    VITALS/DATA/ORDERS: [x] Reviewed  nicardipine, NS+20mEq KCl @75cc/hr    DEVICES: [x] right radial a-line    PHYSICAL EXAM:  Neurological: eyes open, regards, does not follow commands, non-verbal, aphasic, pupils 2mm reactive, left gaze preference, right facial, +cough, left-side spontaneous, RUE localizes with transient antigravity effort, RLE weak wds  Cardiovascular: regular  Respiratory: coarse, no stridor, good air entry  Gastrointestinal: soft, non-distended, non-tender  Extremities: warm, dry  Incision/Wound: dressing c/d/i SUMMARY:   87 year-old woman with history of hypertension and hyperlipidemia presented to [facility] on 5/9/20 with headache and syncopal episode. She was noted to be minimally responsive, plegic on the right and hypertensive to SBP 170s. She was intubated and stared on both propofol and nicardipine drips. 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 She was transferred to [facility] for further care.   Upon arrival to [facility], her exam was notable for eye opening, no command following, right arm localization, right leg withdrawal and purposeful left sided movements. After extensive discussion with the family regarding goals of care, she was brought to the operating room for emergent clot evacuation.    HOSPITAL COURSE:  5/9: left Priyanka clot evacuation  5/11: extubated    24 HOUR EVENTS:  afebrile    ADMISSION SCORES:  ICH Score: 3    VITALS/DATA/ORDERS: [x] Reviewed  nicardipine, NS+20mEq KCl @75cc/hr    DEVICES: [x] right radial a-line    PHYSICAL EXAM:  Neurological: eyes open, regards, does not follow commands, non-verbal, aphasic, pupils 2mm reactive, left gaze preference, right facial, +cough, left-side spontaneous, RUE trace movement for me (but have seen her spontaneously move), RLE weak wds  Cardiovascular: regular  Respiratory: coarse, no stridor, good air entry  Gastrointestinal: soft, non-distended, non-tender  Extremities: warm, dry  Incision/Wound: dressing c/d/i

## 2020-05-13 NOTE — PROGRESS NOTE ADULT - SUBJECTIVE AND OBJECTIVE BOX
Patient seen and examined at bedside.    --Anticoagulation--  enoxaparin Injectable 40 milliGRAM(s) SubCutaneous <User Schedule>    T(C): 37.1 (05-12-20 @ 23:00), Max: 37.1 (05-12-20 @ 23:00)  HR: 91 (05-13-20 @ 05:19) (64 - 102)  BP: 132/76 (05-12-20 @ 06:30) (132/76 - 132/76)  RR: 12 (05-13-20 @ 04:00) (10 - 47)  SpO2: 97% (05-13-20 @ 05:19) (95% - 100%)  Wt(kg): --    Exam:  EO to pain Following simple commands, PERRL, EOMI,    LUE 4/5  , LLE 3/5 RUE ext, RLE wds

## 2020-05-13 NOTE — SWALLOW BEDSIDE ASSESSMENT ADULT - COMMENTS
Hospital Course: 5/11: Extubated. No stridor post-extubation. Some upper airway secretions. 5/11 CT head- No change in moderate residual parenchymal hemorrhage in the left frontal parenchyma and basal ganglia compared with 5/10/2020. No change in mass effect. +Speech and swallow, but likely will need a PEG.   CTH 5.9.2020: Larage IPH involving the L. BG, frontal lobe, medial temporal lobe w/ near complete effacement of the L. lateral ventricle w/ 0.4cm of righward midline shift.   MR brain 3.18.2013: L. BG lacunar infarcts. Microvascular disease. No microhemorrhages noted on SWI.   MRA h/n 3.18.2013: Negative

## 2020-05-14 PROCEDURE — 99292 CRITICAL CARE ADDL 30 MIN: CPT | Mod: 24

## 2020-05-14 PROCEDURE — 99233 SBSQ HOSP IP/OBS HIGH 50: CPT

## 2020-05-14 PROCEDURE — 99291 CRITICAL CARE FIRST HOUR: CPT | Mod: 24

## 2020-05-14 RX ORDER — DILTIAZEM HCL 120 MG
5 CAPSULE, EXT RELEASE 24 HR ORAL ONCE
Refills: 0 | Status: COMPLETED | OUTPATIENT
Start: 2020-05-14 | End: 2020-05-14

## 2020-05-14 RX ORDER — METOPROLOL TARTRATE 50 MG
2.5 TABLET ORAL ONCE
Refills: 0 | Status: COMPLETED | OUTPATIENT
Start: 2020-05-14 | End: 2020-05-14

## 2020-05-14 RX ORDER — AMIODARONE HYDROCHLORIDE 400 MG/1
150 TABLET ORAL ONCE
Refills: 0 | Status: COMPLETED | OUTPATIENT
Start: 2020-05-14 | End: 2020-05-14

## 2020-05-14 RX ORDER — SODIUM CHLORIDE 9 MG/ML
250 INJECTION INTRAMUSCULAR; INTRAVENOUS; SUBCUTANEOUS ONCE
Refills: 0 | Status: COMPLETED | OUTPATIENT
Start: 2020-05-14 | End: 2020-05-15

## 2020-05-14 RX ORDER — DILTIAZEM HCL 120 MG
10 CAPSULE, EXT RELEASE 24 HR ORAL
Qty: 125 | Refills: 0 | Status: DISCONTINUED | OUTPATIENT
Start: 2020-05-14 | End: 2020-05-14

## 2020-05-14 RX ORDER — AMIODARONE HYDROCHLORIDE 400 MG/1
0.5 TABLET ORAL
Qty: 900 | Refills: 0 | Status: DISCONTINUED | OUTPATIENT
Start: 2020-05-14 | End: 2020-05-15

## 2020-05-14 RX ORDER — DILTIAZEM HCL 120 MG
5 CAPSULE, EXT RELEASE 24 HR ORAL
Qty: 125 | Refills: 0 | Status: DISCONTINUED | OUTPATIENT
Start: 2020-05-14 | End: 2020-05-14

## 2020-05-14 RX ORDER — AMIODARONE HYDROCHLORIDE 400 MG/1
1 TABLET ORAL
Qty: 900 | Refills: 0 | Status: COMPLETED | OUTPATIENT
Start: 2020-05-14 | End: 2020-05-14

## 2020-05-14 RX ADMIN — LEVETIRACETAM 400 MILLIGRAM(S): 250 TABLET, FILM COATED ORAL at 17:04

## 2020-05-14 RX ADMIN — Medication 4 MILLIGRAM(S): at 17:04

## 2020-05-14 RX ADMIN — ENOXAPARIN SODIUM 40 MILLIGRAM(S): 100 INJECTION SUBCUTANEOUS at 17:04

## 2020-05-14 RX ADMIN — LEVETIRACETAM 400 MILLIGRAM(S): 250 TABLET, FILM COATED ORAL at 04:12

## 2020-05-14 RX ADMIN — Medication 5 MILLIGRAM(S): at 04:33

## 2020-05-14 RX ADMIN — Medication 3 MILLILITER(S): at 11:07

## 2020-05-14 RX ADMIN — AMIODARONE HYDROCHLORIDE 33.3 MG/MIN: 400 TABLET ORAL at 18:00

## 2020-05-14 RX ADMIN — Medication 3 MILLILITER(S): at 05:08

## 2020-05-14 RX ADMIN — CHLORHEXIDINE GLUCONATE 1 APPLICATION(S): 213 SOLUTION TOPICAL at 21:53

## 2020-05-14 RX ADMIN — SENNA PLUS 2 TABLET(S): 8.6 TABLET ORAL at 21:53

## 2020-05-14 RX ADMIN — Medication 5 MG/HR: at 05:55

## 2020-05-14 RX ADMIN — AMIODARONE HYDROCHLORIDE 600 MILLIGRAM(S): 400 TABLET ORAL at 11:41

## 2020-05-14 RX ADMIN — Medication 3 MILLILITER(S): at 17:22

## 2020-05-14 RX ADMIN — Medication 2 MILLIGRAM(S): at 21:53

## 2020-05-14 RX ADMIN — Medication 2.5 MILLIGRAM(S): at 02:25

## 2020-05-14 RX ADMIN — AMIODARONE HYDROCHLORIDE 16.7 MG/MIN: 400 TABLET ORAL at 19:21

## 2020-05-14 RX ADMIN — LOSARTAN POTASSIUM 100 MILLIGRAM(S): 100 TABLET, FILM COATED ORAL at 11:41

## 2020-05-14 RX ADMIN — Medication 5 MILLIGRAM(S): at 05:55

## 2020-05-14 RX ADMIN — OXYCODONE HYDROCHLORIDE 10 MILLIGRAM(S): 5 TABLET ORAL at 22:51

## 2020-05-14 NOTE — CONSULT NOTE ADULT - ASSESSMENT
afib with RVR  new onset  off a/c due to IPH  recommend change CCB to Amio infusion load     Mod AS  monitor clinically for fluid overload   HR control     IPH   fu with nsx     HTN  stable

## 2020-05-14 NOTE — CHART NOTE - NSCHARTNOTEFT_GEN_A_CORE
Nutrition Follow Up Note  Patient seen for: Nutrition follow up    Source: Nutrition follow up    Diet: Jevity 1.2 at 55ml/hr x 18 hrs. Provides: 9    Patient reports:     PO intake :     Source for PO intake:     Enteral /Parenteral Nutrition:       Daily Weight in k.2 (05-11)  % Weight Change    Pertinent Medications: MEDICATIONS  (STANDING):  albuterol/ipratropium for Nebulization 3 milliLiter(s) Nebulizer every 6 hours  chlorhexidine 4% Liquid 1 Application(s) Topical <User Schedule>  dexAMETHasone     Tablet   Oral   dexAMETHasone     Tablet 4 milliGRAM(s) Oral every 12 hours  diltiazem Infusion 5 mG/Hr (5 mL/Hr) IV Continuous <Continuous>  doxazosin 2 milliGRAM(s) Oral at bedtime  enoxaparin Injectable 40 milliGRAM(s) SubCutaneous <User Schedule>  levETIRAcetam  IVPB 500 milliGRAM(s) IV Intermittent every 12 hours  losartan 100 milliGRAM(s) Oral daily  polyethylene glycol 3350 17 Gram(s) Oral every 12 hours  senna 2 Tablet(s) Oral at bedtime  sodium chloride 0.9% Bolus 250 milliLiter(s) IV Bolus once    MEDICATIONS  (PRN):  acetaminophen   Tablet .. 650 milliGRAM(s) Oral every 6 hours PRN Temp greater or equal to 38C (100.4F), Mild Pain (1 - 3)  ondansetron Injectable 4 milliGRAM(s) IV Push every 6 hours PRN Nausea and/or Vomiting  oxyCODONE    Solution 5 milliGRAM(s) Oral every 4 hours PRN Moderate Pain (4 - 6)  oxyCODONE    Solution 10 milliGRAM(s) Oral every 4 hours PRN Severe Pain (7 - 10)    Pertinent Labs:  @ 20:22: Na 138, BUN 29<H>, Cr 0.55, <H>, K+ 3.5, Phos 2.8, Mg 2.0, Alk Phos 43, ALT/SGPT 63<H>, AST/SGOT 82<H>, HbA1c --    Finger Sticks:      Skin per nursing documentation:   Edema:    Estimated Needs:   [ ] no change since previous assessment  [ ] recalculated:     Previous Nutrition Diagnosis:   Nutrition Diagnosis is:    New Nutrition Diagnosis:  Related to:    As evidenced by:      Interventions:     Recommend  1)    Monitoring and Evaluation:     Continue to monitor Nutritional intake, Tolerance to diet prescription, weights, labs, skin integrity    RD remains available upon request and will follow up per protocol Nutrition Follow Up Note  Patient seen for: Nutrition follow up    Source: Nutrition follow up    Pt is a 68 yo F with PMH: HTN, hyperlipidemia. Presented to OSH 20 with headache and syncopal episode. Imaging revealed a large, left basilar ganglia hemorrhage and transferred to [facility] for further care. Pt s/p left larry clot evacuation on ; extubated . Pt non-verbal, aphasic. Per MD note , pt will likely need a PEG.     Diet: Jevity 1.2 at 55ml/hr x 18 hrs. Provides: 990ml, 1188kcal and 55g protein.     EN provision (per nursing flow sheet):   (): 440ml (thus far; infusing at goal rate 55ml/hr)  (): 800ml (81% of goal; EN titrating up towards goal rate)  (): 140ml (14% of goal; EN titrating up towards goal rate)  (): 200ml (20% of goal)    Stool count (per nursing flow sheet): (on bowel regimen as ordered, senna/miralax)  (): x 3   (): x 2    Daily Weight in k.2 (05-11)  % Weight Change    Pertinent Medications: MEDICATIONS  (STANDING):  albuterol/ipratropium for Nebulization 3 milliLiter(s) Nebulizer every 6 hours  chlorhexidine 4% Liquid 1 Application(s) Topical <User Schedule>  dexAMETHasone     Tablet   Oral   dexAMETHasone     Tablet 4 milliGRAM(s) Oral every 12 hours  diltiazem Infusion 5 mG/Hr (5 mL/Hr) IV Continuous <Continuous>  doxazosin 2 milliGRAM(s) Oral at bedtime  enoxaparin Injectable 40 milliGRAM(s) SubCutaneous <User Schedule>  levETIRAcetam  IVPB 500 milliGRAM(s) IV Intermittent every 12 hours  losartan 100 milliGRAM(s) Oral daily  polyethylene glycol 3350 17 Gram(s) Oral every 12 hours  senna 2 Tablet(s) Oral at bedtime  sodium chloride 0.9% Bolus 250 milliLiter(s) IV Bolus once    MEDICATIONS  (PRN):  acetaminophen   Tablet .. 650 milliGRAM(s) Oral every 6 hours PRN Temp greater or equal to 38C (100.4F), Mild Pain (1 - 3)  ondansetron Injectable 4 milliGRAM(s) IV Push every 6 hours PRN Nausea and/or Vomiting  oxyCODONE    Solution 5 milliGRAM(s) Oral every 4 hours PRN Moderate Pain (4 - 6)  oxyCODONE    Solution 10 milliGRAM(s) Oral every 4 hours PRN Severe Pain (7 - 10)    Pertinent Labs: 05-13 @ 20:22: Na 138, BUN 29<H>, Cr 0.55, <H>, K+ 3.5, Phos 2.8, Mg 2.0, Alk Phos 43, ALT/SGPT 63<H>, AST/SGOT 82<H>, HbA1c --    Finger Sticks:    Skin per nursing documentation:   Edema: 1+ generalized;  left hand    Estimated Needs:   [x] no change since previous assessment  (20-25kcal/kg dosing wt 82.2kg): 1644-2055kcal  (1.4-1.6g protein/kg IBW 58.9kg): 82-94g protein     Previous Nutrition Diagnosis: Increased nutrient needs  Nutrition Diagnosis is: Remains appropriate with provision of EN     Interventions:     Recommend  1) Recommend increase EN feeds: Jevity 1.2 at 60ml/hr x 24 hrs (1560ml). To provide (based on dosing wt 82.2kg): 1872kcal (23kcal/kg) and (based on IBW 58.9kg): 87g protein/kg (1.5g protein/kg).   2) Monitor GI tolerance. RD to remain available to adjust EN formulary, volume/rate PRN.   3) Trend BG levels, renal indices, LFT's, electrolytes and triglycerides     Monitoring and Evaluation:     Continue to monitor Nutritional intake, Tolerance to diet prescription, weights, labs, skin integrity    RD remains available upon request and will follow up per protocol

## 2020-05-14 NOTE — PROGRESS NOTE ADULT - ASSESSMENT
87F w/ PMH of HTN, HLD, with large BG IPH, etiology unknown. S/P larry clot removal. Exam stable.    Plan:   [] MRI brain w/w/o contrast to rule out underlying vascular or mass lesion   [] MRA head w/o contrast; MRA neck w/ contrast  [] Continue statin for secondary stroke prevention (has previous chronic lacunes)

## 2020-05-14 NOTE — PROGRESS NOTE ADULT - SUBJECTIVE AND OBJECTIVE BOX
SUMMARY:   87 year-old woman with history of hypertension and hyperlipidemia presented to [facility] on 5/9/20 with headache and syncopal episode. She was noted to be minimally responsive, plegic on the right and hypertensive to SBP 170s. She was intubated and stared on both propofol and nicardipine drips. 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 She was transferred to [facility] for further care.   Upon arrival to [facility], her exam was notable for eye opening, no command following, right arm localization, right leg withdrawal and purposeful left sided movements. After extensive discussion with the family regarding goals of care, she was brought to the operating room for emergent clot evacuation.    HOSPITAL COURSE:  5/9: left Priyanka clot evacuation  5/11: extubated    24 HOUR EVENTS:  afebrile    ADMISSION SCORES:  ICH Score: 3    VITALS/DATA/ORDERS: [x] Reviewed  nicardipine, NS+20mEq KCl @75cc/hr    DEVICES: [x] right radial a-line    PHYSICAL EXAM:  Neurological: eyes open, regards, does not follow commands, non-verbal, aphasic, pupils 2mm reactive, left gaze preference, right facial, +cough, left-side spontaneous, RUE trace movement for me (but have seen her spontaneously move), RLE weak wds  Cardiovascular: regular  Respiratory: coarse, no stridor, good air entry  Gastrointestinal: soft, non-distended, non-tender  Extremities: warm, dry  Incision/Wound: dressing c/d/i SUMMARY:   87 year-old woman with history of hypertension and hyperlipidemia presented to [facility] on 5/9/20 with headache and syncopal episode. She was noted to be minimally responsive, plegic on the right and hypertensive to SBP 170s. She was intubated and stared on both propofol and nicardipine drips. 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 She was transferred to [facility] for further care.   Upon arrival to [facility], her exam was notable for eye opening, no command following, right arm localization, right leg withdrawal and purposeful left sided movements. After extensive discussion with the family regarding goals of care, she was brought to the operating room for emergent clot evacuation.    HOSPITAL COURSE:  5/9: left Priaynka clot evacuation  5/11: extubated  5/13: afib with RVR status post diltiazem gtt with moderate response    24 HOUR EVENTS:  afebrile    ADMISSION SCORES:  ICH Score: 3    VITALS/DATA/ORDERS: [x] Reviewed    DEVICES: [x] right radial a-line [x] OGT    PHYSICAL EXAM:  Neurological: eyes open, regards, does not follow commands, non-verbal, aphasic, pupils 2mm reactive, left gaze preference, right facial, +cough, left-side spontaneous, RUE at times spontaneously antigravity but mostly extension when stimulated, RLE brisk triple flexion (though at times some more complex movements)  Cardiovascular: irregular  Respiratory: coarse, no stridor, good air entry  Gastrointestinal: soft, non-distended, non-tender  Extremities: warm, dry  Incision/Wound: dressing c/d/i

## 2020-05-14 NOTE — PROGRESS NOTE ADULT - SUBJECTIVE AND OBJECTIVE BOX
SUBJECTIVE:     Vital Signs Last 24 Hrs  T(C): 36.6 (05-14-20 @ 15:00), Max: 36.8 (05-13-20 @ 20:00)  T(F): 97.9 (05-14-20 @ 15:00), Max: 98.2 (05-13-20 @ 20:00)  HR: 125 (05-14-20 @ 19:00) (95 - 132)  BP: --  BP(mean): --  RR: 26 (05-14-20 @ 19:00) (8 - 75)  SpO2: 96% (05-14-20 @ 19:00) (95% - 100%)    PHYSICAL EXAM:    Constitutional: No Acute Distress     Neurological: Awake, alert, oriented to person, place and time, speech clear and fluent, face equal, tongue midline, briskly following commands, no drift, moves all extremities with 5/5 strength, sensation intact to light touch throughout, pupils 4mm and reactive bilaterally, extraocular movements intact, no nystagmus    Incision:     Drains:     Pulmonary: Clear to Auscultation, No rales, No rhonchi, No wheezes     Cardiovascular: S1, S2, Regular rate and rhythm     Gastrointestinal: Soft, Non-tender, Non-distended, +bowel sounds x 4    Extremities: No calf tenderness bilaterally, no cyanosis, clubbing or edema          LABS:                          11.1   6.93  )-----------( 155      ( 13 May 2020 20:22 )             33.5    05-13    138  |  106  |  29<H>  ----------------------------<  163<H>  3.5   |  20<L>  |  0.55    Ca    8.9      13 May 2020 20:22  Phos  2.8     05-13  Mg     2.0     05-13    TPro  5.7<L>  /  Alb  3.3  /  TBili  0.4  /  DBili  x   /  AST  82<H>  /  ALT  63<H>  /  AlkPhos  43  05-13 05-13 @ 07:01  -  05-14 @ 07:00  --------------------------------------------------------  IN: 2930 mL / OUT: 1200 mL / NET: 1730 mL    05-14 @ 07:01 - 05-14 @ 19:34  --------------------------------------------------------  IN: 1173.2 mL / OUT: 400 mL / NET: 773.2 mL        IMAGING:     MEDICATIONS:  Antibiotics:    Neuro:  acetaminophen   Tablet .. 650 milliGRAM(s) Oral every 6 hours PRN Temp greater or equal to 38C (100.4F), Mild Pain (1 - 3)  levETIRAcetam  IVPB 500 milliGRAM(s) IV Intermittent every 12 hours  ondansetron Injectable 4 milliGRAM(s) IV Push every 6 hours PRN Nausea and/or Vomiting  oxyCODONE    Solution 5 milliGRAM(s) Oral every 4 hours PRN Moderate Pain (4 - 6)  oxyCODONE    Solution 10 milliGRAM(s) Oral every 4 hours PRN Severe Pain (7 - 10)    Cardiac:  aMIOdarone Infusion 0.5 mG/Min IV Continuous <Continuous>  doxazosin 2 milliGRAM(s) Oral at bedtime  losartan 100 milliGRAM(s) Oral daily    Pulm:  albuterol/ipratropium for Nebulization 3 milliLiter(s) Nebulizer every 6 hours    GI/:  polyethylene glycol 3350 17 Gram(s) Oral every 12 hours  senna 2 Tablet(s) Oral at bedtime    Other:   chlorhexidine 4% Liquid 1 Application(s) Topical <User Schedule>  dexAMETHasone     Tablet   Oral   dexAMETHasone     Tablet 4 milliGRAM(s) Oral every 12 hours  enoxaparin Injectable 40 milliGRAM(s) SubCutaneous <User Schedule>  sodium chloride 0.9% Bolus 250 milliLiter(s) IV Bolus once        DIET: SUBJECTIVE:     Vital Signs Last 24 Hrs  T(C): 36.6 (05-14-20 @ 15:00), Max: 36.8 (05-13-20 @ 20:00)  T(F): 97.9 (05-14-20 @ 15:00), Max: 98.2 (05-13-20 @ 20:00)  HR: 125 (05-14-20 @ 19:00) (95 - 132)  BP: --  BP(mean): --  RR: 26 (05-14-20 @ 19:00) (8 - 75)  SpO2: 96% (05-14-20 @ 19:00) (95% - 100%)    PHYSICAL EXAM:            LABS:                          11.1   6.93  )-----------( 155      ( 13 May 2020 20:22 )             33.5    05-13    138  |  106  |  29<H>  ----------------------------<  163<H>  3.5   |  20<L>  |  0.55    Ca    8.9      13 May 2020 20:22  Phos  2.8     05-13  Mg     2.0     05-13    TPro  5.7<L>  /  Alb  3.3  /  TBili  0.4  /  DBili  x   /  AST  82<H>  /  ALT  63<H>  /  AlkPhos  43  05-13 05-13 @ 07:01  -  05-14 @ 07:00  --------------------------------------------------------  IN: 2930 mL / OUT: 1200 mL / NET: 1730 mL    05-14 @ 07:01  -  05-14 @ 19:34  --------------------------------------------------------  IN: 1173.2 mL / OUT: 400 mL / NET: 773.2 mL        IMAGING:     MEDICATIONS:  Antibiotics:    Neuro:  acetaminophen   Tablet .. 650 milliGRAM(s) Oral every 6 hours PRN Temp greater or equal to 38C (100.4F), Mild Pain (1 - 3)  levETIRAcetam  IVPB 500 milliGRAM(s) IV Intermittent every 12 hours  ondansetron Injectable 4 milliGRAM(s) IV Push every 6 hours PRN Nausea and/or Vomiting  oxyCODONE    Solution 5 milliGRAM(s) Oral every 4 hours PRN Moderate Pain (4 - 6)  oxyCODONE    Solution 10 milliGRAM(s) Oral every 4 hours PRN Severe Pain (7 - 10)    Cardiac:  aMIOdarone Infusion 0.5 mG/Min IV Continuous <Continuous>  doxazosin 2 milliGRAM(s) Oral at bedtime  losartan 100 milliGRAM(s) Oral daily    Pulm:  albuterol/ipratropium for Nebulization 3 milliLiter(s) Nebulizer every 6 hours    GI/:  polyethylene glycol 3350 17 Gram(s) Oral every 12 hours  senna 2 Tablet(s) Oral at bedtime    Other:   chlorhexidine 4% Liquid 1 Application(s) Topical <User Schedule>  dexAMETHasone     Tablet   Oral   dexAMETHasone     Tablet 4 milliGRAM(s) Oral every 12 hours  enoxaparin Injectable 40 milliGRAM(s) SubCutaneous <User Schedule>  sodium chloride 0.9% Bolus 250 milliLiter(s) IV Bolus once        DIET: SUBJECTIVE:   SUMMARY:   87 year-old woman with history of hypertension and hyperlipidemia presented to [facility] on 5/9/20 with headache and syncopal episode. She was noted to be minimally responsive, plegic on the right and hypertensive to SBP 170s. She was intubated and stared on both propofol and nicardipine drips. 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 She was transferred to [facility] for further care.   Upon arrival to [facility], her exam was notable for eye opening, no command following, right arm localization, right leg withdrawal and purposeful left sided movements. After extensive discussion with the family regarding goals of care, she was brought to the operating room for emergent clot evacuation.      Vital Signs Last 24 Hrs  T(C): 36.6 (05-14-20 @ 15:00), Max: 36.8 (05-13-20 @ 20:00)  T(F): 97.9 (05-14-20 @ 15:00), Max: 98.2 (05-13-20 @ 20:00)  HR: 125 (05-14-20 @ 19:00) (95 - 132)  BP: --  BP(mean): --  RR: 26 (05-14-20 @ 19:00) (8 - 75)  SpO2: 96% (05-14-20 @ 19:00) (95% - 100%)    PHYSICAL EXAM:    Neuro exam: Awake, EO spont, Lt gaze pref, pupils 2 mm R b/l, not FC, RUE nothing, RLE WD, LUE AG, LLE AG        LABS:                          11.1   6.93  )-----------( 155      ( 13 May 2020 20:22 )             33.5    05-13    138  |  106  |  29<H>  ----------------------------<  163<H>  3.5   |  20<L>  |  0.55    Ca    8.9      13 May 2020 20:22  Phos  2.8     05-13  Mg     2.0     05-13    TPro  5.7<L>  /  Alb  3.3  /  TBili  0.4  /  DBili  x   /  AST  82<H>  /  ALT  63<H>  /  AlkPhos  43  05-13 05-13 @ 07:01  -  05-14 @ 07:00  --------------------------------------------------------  IN: 2930 mL / OUT: 1200 mL / NET: 1730 mL    05-14 @ 07:01  - 05-14 @ 19:34  --------------------------------------------------------  IN: 1173.2 mL / OUT: 400 mL / NET: 773.2 mL        IMAGING: reviewed    MEDICATIONS:  Antibiotics:    Neuro:  acetaminophen   Tablet .. 650 milliGRAM(s) Oral every 6 hours PRN Temp greater or equal to 38C (100.4F), Mild Pain (1 - 3)  levETIRAcetam  IVPB 500 milliGRAM(s) IV Intermittent every 12 hours  ondansetron Injectable 4 milliGRAM(s) IV Push every 6 hours PRN Nausea and/or Vomiting  oxyCODONE    Solution 5 milliGRAM(s) Oral every 4 hours PRN Moderate Pain (4 - 6)  oxyCODONE    Solution 10 milliGRAM(s) Oral every 4 hours PRN Severe Pain (7 - 10)    Cardiac:  aMIOdarone Infusion 0.5 mG/Min IV Continuous <Continuous>  doxazosin 2 milliGRAM(s) Oral at bedtime  losartan 100 milliGRAM(s) Oral daily    Pulm:  albuterol/ipratropium for Nebulization 3 milliLiter(s) Nebulizer every 6 hours    GI/:  polyethylene glycol 3350 17 Gram(s) Oral every 12 hours  senna 2 Tablet(s) Oral at bedtime    Other:   chlorhexidine 4% Liquid 1 Application(s) Topical <User Schedule>  dexAMETHasone     Tablet   Oral   dexAMETHasone     Tablet 4 milliGRAM(s) Oral every 12 hours  enoxaparin Injectable 40 milliGRAM(s) SubCutaneous <User Schedule>  sodium chloride 0.9% Bolus 250 milliLiter(s) IV Bolus once        DIET: NPO with tube feeds (Jevity 1.2)

## 2020-05-14 NOTE — SWALLOW BEDSIDE ASSESSMENT ADULT - SLP PERTINENT HISTORY OF CURRENT PROBLEM
PMH of HTN, HLD, transferred from [facility] for NSx eval for large L. BG IPH. Patient presented with severe headache with subsequent syncopal episode. Minimally responsive and intubated for airway protection. Was HTN 170s, started on Caredene and prop gtt. Cardene discontinued prior to transfer. Upon arrival, with sedation held she was unable to open her eyes, not following commands, moving spontaneously on the L, RUE loc, RLE WDs. After extensive discussion with the family regarding goals of care, Pt brought to OR for emergent clot evacuation.

## 2020-05-14 NOTE — SWALLOW BEDSIDE ASSESSMENT ADULT - SLP GENERAL OBSERVATIONS
Pt encountered awake and alert, reclined in bed on 2l/min NC. VSS. Pt non-verbal and unable to follow commands. SLP provided oral hygiene with some resistance characterized by biting down on suction toothette.

## 2020-05-14 NOTE — PROGRESS NOTE ADULT - SUBJECTIVE AND OBJECTIVE BOX
S: S/P left larry clot evac    O:  Vital Signs Last 24 Hrs  T(C): 36.7 (14 May 2020 11:00), Max: 36.8 (13 May 2020 20:00)  T(F): 98 (14 May 2020 11:00), Max: 98.2 (13 May 2020 20:00)  HR: 122 (14 May 2020 14:00) (63 - 132)  BP: --  BP(mean): --  RR: 17 (14 May 2020 14:00) (12 - 75)  SpO2: 97% (14 May 2020 14:00) (95% - 100%)    MEDICATIONS  (STANDING):  albuterol/ipratropium for Nebulization 3 milliLiter(s) Nebulizer every 6 hours  aMIOdarone Infusion 1 mG/Min (33.3 mL/Hr) IV Continuous <Continuous>  aMIOdarone Infusion 0.5 mG/Min (16.7 mL/Hr) IV Continuous <Continuous>  chlorhexidine 4% Liquid 1 Application(s) Topical <User Schedule>  dexAMETHasone     Tablet   Oral   dexAMETHasone     Tablet 4 milliGRAM(s) Oral every 12 hours  doxazosin 2 milliGRAM(s) Oral at bedtime  enoxaparin Injectable 40 milliGRAM(s) SubCutaneous <User Schedule>  levETIRAcetam  IVPB 500 milliGRAM(s) IV Intermittent every 12 hours  losartan 100 milliGRAM(s) Oral daily  polyethylene glycol 3350 17 Gram(s) Oral every 12 hours  senna 2 Tablet(s) Oral at bedtime  sodium chloride 0.9% Bolus 250 milliLiter(s) IV Bolus once    MEDICATIONS  (PRN):  acetaminophen   Tablet .. 650 milliGRAM(s) Oral every 6 hours PRN Temp greater or equal to 38C (100.4F), Mild Pain (1 - 3)  ondansetron Injectable 4 milliGRAM(s) IV Push every 6 hours PRN Nausea and/or Vomiting  oxyCODONE    Solution 5 milliGRAM(s) Oral every 4 hours PRN Moderate Pain (4 - 6)  oxyCODONE    Solution 10 milliGRAM(s) Oral every 4 hours PRN Severe Pain (7 - 10)  87F w/ PMH of HTN, HLD, transferred from [facility] for NSG eval after found to have large L. BG IPH.     Exam:  eyes open, not following commands, no verbal output  right hemiplegia with right facial droop, localizing pain on left    < from: CT Head No Cont (05.11.20 @ 10:27) >  IMPRESSION: No change in moderate residual parenchymal hemorrhage in the left frontal parenchyma and basal ganglia compared with 5/10/2020. No change in mass effect.    < end of copied text >

## 2020-05-14 NOTE — PROGRESS NOTE ADULT - ASSESSMENT
HPI:  87F w/ PMH of HTN, HLD, transferred from [facility] for NSG eval after found to have large L. BG IPH.     Patient found to have severe headache with subsequent syncopal episode. Found to be minimally responsive and so intubated for airway protection. Was HTN 170s, started on Caredene and prop gtt. Cardene discontinue prior to transfer. Upon arrival, with sedation held she was unable to open her eyes, not following commands, moving spontaneously on the L, RUE loc, RLE WDs. After extensive discussion with the family regarding goals of care, she was brought to the operating room for emergent clot evacuation. (09 May 2020 20:05)    PROCEDURE: Evacuation, hematoma, intracranial, endoscopic     POD#        Assessment:  Please Check When Present   []  GCS  E   V  M     Heart Failure: []Acute, [] acute on chronic , []chronic  Heart Failure:  [] Diastolic (HFpEF), [] Systolic (HFrEF), []Combined (HFpEF and HFrEF), [] RHF, [] Pulm HTN, [] Other    [] SCAR, [] ATN, [] AIN, [] other  [] CKD1, [] CKD2, [] CKD 3, [] CKD 4, [] CKD 5, []ESRD    Encephalopathy: [] Metabolic, [] Hepatic, [] toxic, [] Neurological, [] Other    Abnormal Nurtitional Status: [] malnurtition (see nutrition note), [ ]underweight: BMI < 19, [] morbid obesity: BMI >40, [] Cachexia    [] Sepsis  [] hypovolemic shock,[] cardiogenic shock, [] hemorrhagic shock, [] neuogenic shock  [] Acute Respiratory Failure  []Cerebral edema, [] Brain compression/ herniation,   [] Functional quadriplegia  [] Acute blood loss anemia        PLAN:       Neuro:      CV:    Pulm:    GI:      Renal:      ID:      Heme:      Endo:    CODE STATUS:  [x] Full Code     DISPOSITION:  [x] ICU     [x] Patient is at high risk of neurologic deterioration/death due to: Large left basal ganglia hemorrhage, brain compression    Total critical care time 45 minutes HPI:  87F w/ PMH of HTN, HLD, transferred from [facility] for NSG eval after found to have large L. BG IPH.     Patient found to have severe headache with subsequent syncopal episode. Found to be minimally responsive and so intubated for airway protection. Was HTN 170s, started on Caredene and prop gtt. Cardene discontinue prior to transfer. Upon arrival, with sedation held she was unable to open her eyes, not following commands, moving spontaneously on the L, RUE loc, RLE WDs. After extensive discussion with the family regarding goals of care, she was brought to the operating room for emergent clot evacuation. (09 May 2020 20:05)    PROCEDURE: Evacuation, hematoma, intracranial, endoscopic     POD#5        Assessment:  Please Check When Present   []  GCS  E   V  M     Heart Failure: []Acute, [] acute on chronic , []chronic  Heart Failure:  [] Diastolic (HFpEF), [] Systolic (HFrEF), []Combined (HFpEF and HFrEF), [] RHF, [] Pulm HTN, [] Other    [] SCAR, [] ATN, [] AIN, [] other  [] CKD1, [] CKD2, [] CKD 3, [] CKD 4, [] CKD 5, []ESRD    Encephalopathy: [] Metabolic, [] Hepatic, [] toxic, [] Neurological, [] Other    Abnormal Nurtitional Status: [] malnurtition (see nutrition note), [ ]underweight: BMI < 19, [] morbid obesity: BMI >40, [] Cachexia    [] Sepsis  [] hypovolemic shock,[] cardiogenic shock, [] hemorrhagic shock, [] neuogenic shock  [] Acute Respiratory Failure  []Cerebral edema, [] Brain compression/ herniation,   [] Functional quadriplegia  [] Acute blood loss anemia        PLAN:   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, status post Priyanka evacuation, post-operative day 5    Neuro:   Neuro checks q4h   steroids for cerebral edema, taper per NSGY  levetiracetam per NSGY  delirium precautions  mobilize, PT/OT    Respiratory:   maintain SpO2 > 92%  secretions: hypertonic nebs, chest PT    CV:  -160mmHg  hypertension: losartan  hyperlipemia: statin   atrial fibrillation: switch to amiodarone gtt but monitor LFTs closely (may need to discontinue if LFTs increase); will follow up LFTS with tonight labs     GI:    speech and swallow, but likely will need a PEG  tube feeds    Renal:   IVL  urinary retention: improved with doxazosin    Endocrine:   euglycemia   A1C 5.5     Heme/Onc:               DVT ppx: [] SQH [x] SQL [X] venodynes bilaterally    ID: afebrile       CODE STATUS:  [x] Full Code     DISPOSITION:  [x] ICU     [x] Patient is at high risk of neurologic deterioration/death due to: Large left basal ganglia hemorrhage, brain compression    Total critical care time 45 minutes

## 2020-05-14 NOTE — CONSULT NOTE ADULT - SUBJECTIVE AND OBJECTIVE BOX
CHIEF COMPLAINT:Patient is a 84y old  Female who presents with a chief complaint of IPH (14 May 2020 07:46)      HISTORY OF PRESENT ILLNESS:    84 female with HTN, IPH now with afib since 7 pm last night   Due to altered mental status, subjective information were not able to be obtained from the patient. History was obtained, to the extent possible, from review of the chart and collateral sources of information.     PAST MEDICAL & SURGICAL HISTORY:  HTN (hypertension)          MEDICATIONS:  diltiazem Infusion 5 mG/Hr IV Continuous <Continuous>  doxazosin 2 milliGRAM(s) Oral at bedtime  enoxaparin Injectable 40 milliGRAM(s) SubCutaneous <User Schedule>  losartan 100 milliGRAM(s) Oral daily      albuterol/ipratropium for Nebulization 3 milliLiter(s) Nebulizer every 6 hours    acetaminophen   Tablet .. 650 milliGRAM(s) Oral every 6 hours PRN  levETIRAcetam  IVPB 500 milliGRAM(s) IV Intermittent every 12 hours  ondansetron Injectable 4 milliGRAM(s) IV Push every 6 hours PRN  oxyCODONE    Solution 5 milliGRAM(s) Oral every 4 hours PRN  oxyCODONE    Solution 10 milliGRAM(s) Oral every 4 hours PRN    polyethylene glycol 3350 17 Gram(s) Oral every 12 hours  senna 2 Tablet(s) Oral at bedtime    dexAMETHasone     Tablet   Oral   dexAMETHasone     Tablet 4 milliGRAM(s) Oral every 12 hours    chlorhexidine 4% Liquid 1 Application(s) Topical <User Schedule>  sodium chloride 0.9% Bolus 250 milliLiter(s) IV Bolus once      FAMILY HISTORY:      Non-contributory    SOCIAL HISTORY:    No tobacco, drugs or etoh    Allergies    No Known Allergies    Intolerances    	    REVIEW OF SYSTEMS:  as above  The rest of the 14 points ROS reviewed and except above they are unremarkable.        PHYSICAL EXAM:  T(C): 36.7 (05-14-20 @ 04:00), Max: 37.1 (05-13-20 @ 11:00)  HR: 97 (05-14-20 @ 07:30) (63 - 132)  BP: -- 126/80  RR: 13 (05-14-20 @ 07:30) (12 - 75)  SpO2: 97% (05-14-20 @ 07:30) (95% - 100%)  Wt(kg): --  I&O's Summary    13 May 2020 07:01  -  14 May 2020 07:00  --------------------------------------------------------  IN: 2930 mL / OUT: 1200 mL / NET: 1730 mL        JVP: Normal  Neck: supple  Lung: clear   CV: S1 S2 , Murmur:  Abd: soft  Ext: No edema  neuro: obtunded   Psych: flat affect  Skin: normal      LABS/DATA:    TELEMETRY: 	    ECG:  	   	  CARDIAC MARKERS:        < from: Transthoracic Echocardiogram (05.12.20 @ 17:19) >  1. Calcified aortic valve with decreased opening. Peak  transaortic valve gradient equals 34 mm Hg, mean  transaortic valve gradient equals 18 mm Hg, estimated  aortic valve area equals 1.3 sqcm (by continuity equation),  aortic valve velocity time integral equals 62 cm,  consistent with moderate aortic stenosis. Dimensionless  index = 0.42, consistent with moderate aortic stenosis.  Mild aortic regurgitation.  2. Increased relative wall thickness with normal left  ventricular mass index, consistent with concentric left  ventricular remodeling.  3. Hyperdynamic left ventricular systolic function.  4. Moderate diastolic dysfunction (Stage II).  5. The right ventricle is not well visualized; grossly  normal right ventricular systolic function.  6. Estimated right ventricular systolic pressure equals 37  mm Hg, assuming right atrial pressure equals 8 mm Hg,  consistent with borderline pulmonary hypertension.  *** No previous Echo exam.    < end of copied text >                                11.1   6.93  )-----------( 155      ( 13 May 2020 20:22 )             33.5     05-13    138  |  106  |  29<H>  ----------------------------<  163<H>  3.5   |  20<L>  |  0.55    Ca    8.9      13 May 2020 20:22  Phos  2.8     05-13  Mg     2.0     05-13    TPro  5.7<L>  /  Alb  3.3  /  TBili  0.4  /  DBili  x   /  AST  82<H>  /  ALT  63<H>  /  AlkPhos  43  05-13    proBNP:   Lipid Profile:   HgA1c:   TSH:

## 2020-05-14 NOTE — PROGRESS NOTE ADULT - ASSESSMENT
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, status post Priyanka evacuation, post-operative day 5    Neuro:   steroids for cerebral edema, taper per NSGY  levetiracetam per NSGY  delirium precautions  mobilize, PT/OT    Respiratory:   maintain SpO2 > 92%  secretions: hypertonic nebs, chest PT    CV:  -160mmHg  hypertension: losartan  hyperlipemia: statin   TTE-p     GI:    speech and swallow, but likely will need a PEG  tube feeds    Renal:   urinary retention: doxazosin    Endocrine:   euglycemia   A1C 5.5     Heme/Onc:               DVT ppx: [] SQH [x] SQL [X] venodynes bilaterally    ID: afebrile     CODE STATUS:  [x] Full Code 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, status post Priyanka evacuation, post-operative day 5    Neuro:   steroids for cerebral edema, taper per NSGY  levetiracetam per NSGY  delirium precautions  mobilize, PT/OT    Respiratory:   maintain SpO2 > 92%  secretions: hypertonic nebs, chest PT    CV:  -160mmHg  hypertension: losartan  hyperlipemia: statin   atrial fibrillation: switch to amiodarone gtt but monitor LFTs closely (may need to discontinue if LFTs increase)    GI:    speech and swallow, but likely will need a PEG  tube feeds    Renal:   urinary retention: improved with doxazosin    Endocrine:   euglycemia   A1C 5.5     Heme/Onc:               DVT ppx: [] SQH [x] SQL [X] venodynes bilaterally    ID: afebrile     CODE STATUS:  [x] Full Code

## 2020-05-14 NOTE — SWALLOW BEDSIDE ASSESSMENT ADULT - SWALLOW EVAL: DIAGNOSIS
Pt being followed by this service for speech-language evaluation; however, was intubated. Now, Pt extubated and bedside swallow consult received and appreciated. Per documentation and discussion with NSX MD Moran, pt being suctioned thick secretions. Given same, it appears Pt is not appropriate for dysphagia evaluation or PO trials given poor secretion management. This service will continue to follow Pt with potential evaluation pending improvement in secretion management. At this time, it is recommended continue NPO, with non-oral nutrition/hydration/medications.
Pt seen this date for clinical bedside swallow evaluation 2/2 R large BG infarct s/p evacuation. Today, Pt presented with 1) oral and suspected pharyngeal dysphagia superimposed upon reduced orientation to feeding task. SLP provided minimal amount of crushed ice to Pt lateral sulcus due to poor oral grading to utensil. Pt presented with delayed oral manipulation, increased oral transit time, suspected delayed pharyngeal swallow with delayed congested cough. 2) Expressive and receptive aphasia. Pt non-verbal and not following directives at this time. Recommend continue to anticipate Pt wants/needs. This service will continue to work with and follow patient to determine candidacy for PO diet vs need for instrumental assessment.

## 2020-05-14 NOTE — SWALLOW BEDSIDE ASSESSMENT ADULT - ADDITIONAL RECOMMENDATIONS
Continue to anticipate Pt wants/needs.   Maintain adequate oral hygiene and suctioning as needed.   This service will continue to follow Pt.

## 2020-05-15 LAB
ALBUMIN SERPL ELPH-MCNC: 3 G/DL — LOW (ref 3.3–5)
ALP SERPL-CCNC: 51 U/L — SIGNIFICANT CHANGE UP (ref 40–120)
ALT FLD-CCNC: 60 U/L — HIGH (ref 10–45)
ANION GAP SERPL CALC-SCNC: 10 MMOL/L — SIGNIFICANT CHANGE UP (ref 5–17)
AST SERPL-CCNC: 40 U/L — SIGNIFICANT CHANGE UP (ref 10–40)
BILIRUB DIRECT SERPL-MCNC: 0.1 MG/DL — SIGNIFICANT CHANGE UP (ref 0–0.2)
BILIRUB INDIRECT FLD-MCNC: 0.2 MG/DL — SIGNIFICANT CHANGE UP (ref 0.2–1)
BILIRUB SERPL-MCNC: 0.3 MG/DL — SIGNIFICANT CHANGE UP (ref 0.2–1.2)
BUN SERPL-MCNC: 34 MG/DL — HIGH (ref 7–23)
CALCIUM SERPL-MCNC: 8.3 MG/DL — LOW (ref 8.4–10.5)
CHLORIDE SERPL-SCNC: 106 MMOL/L — SIGNIFICANT CHANGE UP (ref 96–108)
CO2 SERPL-SCNC: 24 MMOL/L — SIGNIFICANT CHANGE UP (ref 22–31)
CREAT SERPL-MCNC: 0.6 MG/DL — SIGNIFICANT CHANGE UP (ref 0.5–1.3)
GLUCOSE SERPL-MCNC: 170 MG/DL — HIGH (ref 70–99)
HCT VFR BLD CALC: 35.1 % — SIGNIFICANT CHANGE UP (ref 34.5–45)
HGB BLD-MCNC: 11.3 G/DL — LOW (ref 11.5–15.5)
MAGNESIUM SERPL-MCNC: 1.9 MG/DL — SIGNIFICANT CHANGE UP (ref 1.6–2.6)
MCHC RBC-ENTMCNC: 31.2 PG — SIGNIFICANT CHANGE UP (ref 27–34)
MCHC RBC-ENTMCNC: 32.2 GM/DL — SIGNIFICANT CHANGE UP (ref 32–36)
MCV RBC AUTO: 97 FL — SIGNIFICANT CHANGE UP (ref 80–100)
NRBC # BLD: 0 /100 WBCS — SIGNIFICANT CHANGE UP (ref 0–0)
PHOSPHATE SERPL-MCNC: 3.2 MG/DL — SIGNIFICANT CHANGE UP (ref 2.5–4.5)
PLATELET # BLD AUTO: 160 K/UL — SIGNIFICANT CHANGE UP (ref 150–400)
POTASSIUM SERPL-MCNC: 4.1 MMOL/L — SIGNIFICANT CHANGE UP (ref 3.5–5.3)
POTASSIUM SERPL-SCNC: 4.1 MMOL/L — SIGNIFICANT CHANGE UP (ref 3.5–5.3)
PROT SERPL-MCNC: 5.2 G/DL — LOW (ref 6–8.3)
RBC # BLD: 3.62 M/UL — LOW (ref 3.8–5.2)
RBC # FLD: 13.2 % — SIGNIFICANT CHANGE UP (ref 10.3–14.5)
SODIUM SERPL-SCNC: 140 MMOL/L — SIGNIFICANT CHANGE UP (ref 135–145)
WBC # BLD: 6.59 K/UL — SIGNIFICANT CHANGE UP (ref 3.8–10.5)
WBC # FLD AUTO: 6.59 K/UL — SIGNIFICANT CHANGE UP (ref 3.8–10.5)

## 2020-05-15 PROCEDURE — 99292 CRITICAL CARE ADDL 30 MIN: CPT | Mod: 24

## 2020-05-15 PROCEDURE — 99291 CRITICAL CARE FIRST HOUR: CPT

## 2020-05-15 RX ORDER — DILTIAZEM HCL 120 MG
5 CAPSULE, EXT RELEASE 24 HR ORAL
Qty: 125 | Refills: 0 | Status: DISCONTINUED | OUTPATIENT
Start: 2020-05-15 | End: 2020-05-16

## 2020-05-15 RX ORDER — DIGOXIN 250 MCG
500 TABLET ORAL ONCE
Refills: 0 | Status: COMPLETED | OUTPATIENT
Start: 2020-05-15 | End: 2020-05-15

## 2020-05-15 RX ORDER — DILTIAZEM HCL 120 MG
30 CAPSULE, EXT RELEASE 24 HR ORAL EVERY 6 HOURS
Refills: 0 | Status: DISCONTINUED | OUTPATIENT
Start: 2020-05-15 | End: 2020-05-15

## 2020-05-15 RX ORDER — DOXAZOSIN MESYLATE 4 MG
2 TABLET ORAL AT BEDTIME
Refills: 0 | Status: DISCONTINUED | OUTPATIENT
Start: 2020-05-15 | End: 2020-05-15

## 2020-05-15 RX ORDER — METOPROLOL TARTRATE 50 MG
12.5 TABLET ORAL EVERY 8 HOURS
Refills: 0 | Status: DISCONTINUED | OUTPATIENT
Start: 2020-05-15 | End: 2020-05-17

## 2020-05-15 RX ORDER — PHENYLEPHRINE HYDROCHLORIDE 10 MG/ML
1 INJECTION INTRAVENOUS
Qty: 160 | Refills: 0 | Status: DISCONTINUED | OUTPATIENT
Start: 2020-05-15 | End: 2020-05-15

## 2020-05-15 RX ORDER — DIGOXIN 250 MCG
250 TABLET ORAL ONCE
Refills: 0 | Status: COMPLETED | OUTPATIENT
Start: 2020-05-15 | End: 2020-05-15

## 2020-05-15 RX ORDER — SODIUM CHLORIDE 9 MG/ML
3 INJECTION INTRAMUSCULAR; INTRAVENOUS; SUBCUTANEOUS EVERY 6 HOURS
Refills: 0 | Status: DISCONTINUED | OUTPATIENT
Start: 2020-05-15 | End: 2020-05-28

## 2020-05-15 RX ORDER — AMIODARONE HYDROCHLORIDE 400 MG/1
200 TABLET ORAL EVERY 12 HOURS
Refills: 0 | Status: DISCONTINUED | OUTPATIENT
Start: 2020-05-15 | End: 2020-05-15

## 2020-05-15 RX ORDER — DILTIAZEM HCL 120 MG
10 CAPSULE, EXT RELEASE 24 HR ORAL ONCE
Refills: 0 | Status: COMPLETED | OUTPATIENT
Start: 2020-05-15 | End: 2020-05-15

## 2020-05-15 RX ORDER — MAGNESIUM SULFATE 500 MG/ML
1 VIAL (ML) INJECTION ONCE
Refills: 0 | Status: COMPLETED | OUTPATIENT
Start: 2020-05-15 | End: 2020-05-15

## 2020-05-15 RX ORDER — DOXAZOSIN MESYLATE 4 MG
2 TABLET ORAL AT BEDTIME
Refills: 0 | Status: DISCONTINUED | OUTPATIENT
Start: 2020-05-15 | End: 2020-05-22

## 2020-05-15 RX ORDER — DILTIAZEM HCL 120 MG
1 CAPSULE, EXT RELEASE 24 HR ORAL
Qty: 125 | Refills: 0 | Status: DISCONTINUED | OUTPATIENT
Start: 2020-05-15 | End: 2020-05-15

## 2020-05-15 RX ORDER — DIGOXIN 250 MCG
250 TABLET ORAL ONCE
Refills: 0 | Status: COMPLETED | OUTPATIENT
Start: 2020-05-16 | End: 2020-05-16

## 2020-05-15 RX ORDER — SODIUM CHLORIDE 9 MG/ML
500 INJECTION INTRAMUSCULAR; INTRAVENOUS; SUBCUTANEOUS ONCE
Refills: 0 | Status: COMPLETED | OUTPATIENT
Start: 2020-05-15 | End: 2020-05-15

## 2020-05-15 RX ORDER — PHENYLEPHRINE HYDROCHLORIDE 10 MG/ML
1 INJECTION INTRAVENOUS
Qty: 40 | Refills: 0 | Status: DISCONTINUED | OUTPATIENT
Start: 2020-05-15 | End: 2020-05-16

## 2020-05-15 RX ADMIN — Medication 4 MILLIGRAM(S): at 05:01

## 2020-05-15 RX ADMIN — PHENYLEPHRINE HYDROCHLORIDE 30.8 MICROGRAM(S)/KG/MIN: 10 INJECTION INTRAVENOUS at 10:33

## 2020-05-15 RX ADMIN — Medication 5 MG/HR: at 11:37

## 2020-05-15 RX ADMIN — POLYETHYLENE GLYCOL 3350 17 GRAM(S): 17 POWDER, FOR SOLUTION ORAL at 17:11

## 2020-05-15 RX ADMIN — ENOXAPARIN SODIUM 40 MILLIGRAM(S): 100 INJECTION SUBCUTANEOUS at 17:10

## 2020-05-15 RX ADMIN — LEVETIRACETAM 400 MILLIGRAM(S): 250 TABLET, FILM COATED ORAL at 05:01

## 2020-05-15 RX ADMIN — LOSARTAN POTASSIUM 100 MILLIGRAM(S): 100 TABLET, FILM COATED ORAL at 05:01

## 2020-05-15 RX ADMIN — CHLORHEXIDINE GLUCONATE 1 APPLICATION(S): 213 SOLUTION TOPICAL at 22:03

## 2020-05-15 RX ADMIN — SENNA PLUS 2 TABLET(S): 8.6 TABLET ORAL at 21:42

## 2020-05-15 RX ADMIN — Medication 100 GRAM(S): at 03:29

## 2020-05-15 RX ADMIN — AMIODARONE HYDROCHLORIDE 200 MILLIGRAM(S): 400 TABLET ORAL at 17:10

## 2020-05-15 RX ADMIN — Medication 2 MILLIGRAM(S): at 17:10

## 2020-05-15 RX ADMIN — Medication 500 MICROGRAM(S): at 19:58

## 2020-05-15 RX ADMIN — SODIUM CHLORIDE 1000 MILLILITER(S): 9 INJECTION INTRAMUSCULAR; INTRAVENOUS; SUBCUTANEOUS at 02:17

## 2020-05-15 RX ADMIN — SODIUM CHLORIDE 1000 MILLILITER(S): 9 INJECTION INTRAMUSCULAR; INTRAVENOUS; SUBCUTANEOUS at 09:15

## 2020-05-15 RX ADMIN — LEVETIRACETAM 400 MILLIGRAM(S): 250 TABLET, FILM COATED ORAL at 17:11

## 2020-05-15 RX ADMIN — Medication 3 MILLILITER(S): at 23:01

## 2020-05-15 RX ADMIN — POLYETHYLENE GLYCOL 3350 17 GRAM(S): 17 POWDER, FOR SOLUTION ORAL at 05:01

## 2020-05-15 RX ADMIN — AMIODARONE HYDROCHLORIDE 200 MILLIGRAM(S): 400 TABLET ORAL at 07:47

## 2020-05-15 RX ADMIN — Medication 10 MILLIGRAM(S): at 08:54

## 2020-05-15 NOTE — PROGRESS NOTE ADULT - ASSESSMENT
PROCEDURE: Left Priyanka of evacuation of ICH     POD#6      Assessment:  Please Check When Present   []  GCS  E   V  M     Heart Failure: []Acute, [] acute on chronic , []chronic  Heart Failure:  [] Diastolic (HFpEF), [] Systolic (HFrEF), []Combined (HFpEF and HFrEF), [] RHF, [] Pulm HTN, [] Other    [] SCAR, [] ATN, [] AIN, [] other  [] CKD1, [] CKD2, [] CKD 3, [] CKD 4, [] CKD 5, []ESRD    Encephalopathy: [] Metabolic, [] Hepatic, [] toxic, [] Neurological, [] Other    Abnormal Nurtitional Status: [] malnurtition (see nutrition note), [ ]underweight: BMI < 19, [] morbid obesity: BMI >40, [] Cachexia    [] Sepsis  [] hypovolemic shock,[] cardiogenic shock, [] hemorrhagic shock, [] neuogenic shock  [] Acute Respiratory Failure  [X]Cerebral edema, [] Brain compression/ herniation,   [] Functional quadriplegia  [] Acute blood loss anemia        PLAN:   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, status post Priyanka evacuation, post-operative day 6    Neuro:   Neuro checks q4h   steroids for cerebral edema, taper per NSGY  levetiracetam per NSGY  delirium precautions  mobilize, PT/OT    Respiratory:   room air  secretions: chest PT and sodium chloride 3% inhalation prn    CV:  -160mmHg  hypertension: losartan  hyperlipemia: statin   atrial fibrillation: cardiology / EP following  per EP recs; d/c amio drip, digoxin load .5 mg, then load again with .25 mg in 6 hrs, then in another 6 hrs, .25 mg.   After dig load, can start dig .25 mg QD. c/w cardizem drip and start lopressor 12.5 mg q8h; titrate up as needed for rate control    GI:    speech and swallow, but likely will need a PEG  tube feeds    Renal:   IVL  doxazosin held due to hypotension    Endocrine:   euglycemia   A1C 5.5     Heme/Onc:               DVT ppx: [] SQH [x] SQL [X] venodynes bilaterally    ID: afebrile       CODE STATUS:  [x] Full Code     DISPOSITION:  [x] ICU     [x] Patient is at high risk of neurologic deterioration/death due to: Large left basal ganglia hemorrhage, brain compression    Total critical care time 45 minutes

## 2020-05-15 NOTE — CONSULT NOTE ADULT - SUBJECTIVE AND OBJECTIVE BOX
CHIEF COMPLAINT:    HISTORY OF PRESENT ILLNESS:      Allergies    No Known Allergies    Intolerances    	    MEDICATIONS:  aMIOdarone    Tablet 200 milliGRAM(s) Oral every 12 hours  diltiazem Infusion 5 mG/Hr IV Continuous <Continuous>  doxazosin 2 milliGRAM(s) Oral at bedtime  enoxaparin Injectable 40 milliGRAM(s) SubCutaneous <User Schedule>  phenylephrine    Infusion 1 MICROgram(s)/kG/Min IV Continuous <Continuous>      albuterol/ipratropium for Nebulization 3 milliLiter(s) Nebulizer every 6 hours    acetaminophen   Tablet .. 650 milliGRAM(s) Oral every 6 hours PRN  levETIRAcetam  IVPB 500 milliGRAM(s) IV Intermittent every 12 hours  ondansetron Injectable 4 milliGRAM(s) IV Push every 6 hours PRN  oxyCODONE    Solution 5 milliGRAM(s) Oral every 4 hours PRN  oxyCODONE    Solution 10 milliGRAM(s) Oral every 4 hours PRN    polyethylene glycol 3350 17 Gram(s) Oral every 12 hours  senna 2 Tablet(s) Oral at bedtime    dexAMETHasone     Tablet   Oral   dexAMETHasone     Tablet 2 milliGRAM(s) Oral every 12 hours    chlorhexidine 4% Liquid 1 Application(s) Topical <User Schedule>      PAST MEDICAL & SURGICAL HISTORY:  HTN (hypertension)      FAMILY HISTORY:      SOCIAL HISTORY:    [ ]Non-smoker  [ ] Smoker  [ ] Alcohol      REVIEW OF SYSTEMS:  See HPI. Otherwise, 10 point ROS done and otherwise negative.    PHYSICAL EXAM:  T(C): 36.7 (05-15-20 @ 16:00), Max: 37 (05-14-20 @ 23:00)  HR: 103 (05-15-20 @ 16:45) (87 - 142)  BP: 121/62 (05-15-20 @ 16:00) (88/65 - 121/62)  RR: 14 (05-15-20 @ 16:45) (8 - 41)  SpO2: 96% (05-15-20 @ 16:45) (94% - 100%)  Wt(kg): --  I&O's Summary    14 May 2020 07:01  -  15 May 2020 07:00  --------------------------------------------------------  IN: 2236.9 mL / OUT: 1005 mL / NET: 1231.9 mL    15 May 2020 07:01  -  15 May 2020 16:59  --------------------------------------------------------  IN: 1455.7 mL / OUT: 150 mL / NET: 1305.7 mL        Appearance: Alert. NAD	  HEENT:   NC/AT	  Cardiovascular: +S1S2 RRR no m/g/r  Respiratory: CTA B/L	  Psychiatry: A & O x 3, Mood & affect appropriate  Gastrointestinal:  Soft, NT.ND., + BS	  Skin: No rashes	  Neurologic: Non-focal  Extremities: No edema BLE  Vascular: Peripheral pulses palpable 2+ bilaterally      LABS:	 	    CBC Full  -  ( 14 May 2020 23:54 )  WBC Count : 6.59 K/uL  Hemoglobin : 11.3 g/dL  Hematocrit : 35.1 %  Platelet Count - Automated : 160 K/uL  Mean Cell Volume : 97.0 fl  Mean Cell Hemoglobin : 31.2 pg  Mean Cell Hemoglobin Concentration : 32.2 gm/dL  Auto Neutrophil # : x  Auto Lymphocyte # : x  Auto Monocyte # : x  Auto Eosinophil # : x  Auto Basophil # : x  Auto Neutrophil % : x  Auto Lymphocyte % : x  Auto Monocyte % : x  Auto Eosinophil % : x  Auto Basophil % : x    05-14    140  |  106  |  34<H>  ----------------------------<  170<H>  4.1   |  24  |  0.60  05-13    138  |  106  |  29<H>  ----------------------------<  163<H>  3.5   |  20<L>  |  0.55    Ca    8.3<L>      14 May 2020 23:54  Ca    8.9      13 May 2020 20:22  Phos  3.2     05-14  Phos  2.8     05-13  Mg     1.9     05-14  Mg     2.0     05-13    TPro  5.2<L>  /  Alb  3.0<L>  /  TBili  0.3  /  DBili  0.1  /  AST  40  /  ALT  60<H>  /  AlkPhos  51  05-14  TPro  5.7<L>  /  Alb  3.3  /  TBili  0.4  /  DBili  x   /  AST  82<H>  /  ALT  63<H>  /  AlkPhos  43  05-13    TELEMETRY: 	    ECG:  	  RADIOLOGY:  OTHER: 	    PREVIOUS DIAGNOSTIC TESTING:    Echocardiogram:    Catheterization:    Stress Test:  	  	  ASSESSMENT/PLAN: CHIEF COMPLAINT: AFL    HISTORY OF PRESENT ILLNESS:  83y/o female h/o HTN, HLD, CVA (2013) p/w left basal ganglia hemorrhage with mass effect & midline shift s/p left sided craniectomy w/ hematoma evacuation 5/9/2020 c/b developed AF/AFL w/ RVR on 5/13 ~7pm.    Patient presented with severe HA and became unresponsive and taken to hospital where patient noted to have intracranial hemorrhage. Patient aphasic and currently not following commands.   When patient went into AF, patient had received IV doses of metoprolol as well as diltiazem. Patient started on IV amio gtt and then transitioned to amio 200mg po BID this am. Dilt gtt also started. Patient currently in AFL w/ VR ~140bpm  Patient not candidate for AC    Allergies  No Known Allergies    MEDICATIONS:  aMIOdarone    Tablet 200 milliGRAM(s) Oral every 12 hours  diltiazem Infusion 5 mG/Hr IV Continuous <Continuous>  doxazosin 2 milliGRAM(s) Oral at bedtime  enoxaparin Injectable 40 milliGRAM(s) SubCutaneous <User Schedule>  phenylephrine    Infusion 1 MICROgram(s)/kG/Min IV Continuous <Continuous>  albuterol/ipratropium for Nebulization 3 milliLiter(s) Nebulizer every 6 hours  acetaminophen   Tablet .. 650 milliGRAM(s) Oral every 6 hours PRN  levETIRAcetam  IVPB 500 milliGRAM(s) IV Intermittent every 12 hours  ondansetron Injectable 4 milliGRAM(s) IV Push every 6 hours PRN  oxyCODONE    Solution 5 milliGRAM(s) Oral every 4 hours PRN  oxyCODONE    Solution 10 milliGRAM(s) Oral every 4 hours PRN  polyethylene glycol 3350 17 Gram(s) Oral every 12 hours  senna 2 Tablet(s) Oral at bedtime  dexAMETHasone     Tablet   Oral   dexAMETHasone     Tablet 2 milliGRAM(s) Oral every 12 hours  chlorhexidine 4% Liquid 1 Application(s) Topical <User Schedule>      PAST MEDICAL & SURGICAL HISTORY:  HTN (hypertension)      FAMILY HISTORY:  N/C    SOCIAL HISTORY:    No toxic habits      REVIEW OF SYSTEMS:  See HPI. Otherwise, 10 point ROS done and otherwise negative.    PHYSICAL EXAM:  T(C): 36.7 (05-15-20 @ 16:00), Max: 37 (05-14-20 @ 23:00)  HR: 103 (05-15-20 @ 16:45) (87 - 142)  BP: 121/62 (05-15-20 @ 16:00) (88/65 - 121/62)  RR: 14 (05-15-20 @ 16:45) (8 - 41)  SpO2: 96% (05-15-20 @ 16:45) (94% - 100%)  Wt(kg): --  I&O's Summary    14 May 2020 07:01  -  15 May 2020 07:00  --------------------------------------------------------  IN: 2236.9 mL / OUT: 1005 mL / NET: 1231.9 mL    15 May 2020 07:01  -  15 May 2020 16:59  --------------------------------------------------------  IN: 1455.7 mL / OUT: 150 mL / NET: 1305.7 mL        Appearance: Awake  HEENT:   NC/AT	  Cardiovascular: +S1S2 RRR no m/g/r  Respiratory: CTA B/L	  Psych: Unable to assess. Cooperative  Gastrointestinal:  Soft, NT.ND., + BS	  Skin: No rashes	  Neurologic: Aphasic. Moves extremities spontaneously. Not following commands. Opens eyes and looks in direction fo verbal stimuli  Extremities: No edema BLE  Vascular: Peripheral pulses palpable 2+ bilaterally      LABS:	 	    CBC Full  -  ( 14 May 2020 23:54 )  WBC Count : 6.59 K/uL  Hemoglobin : 11.3 g/dL  Hematocrit : 35.1 %  Platelet Count - Automated : 160 K/uL  Mean Cell Volume : 97.0 fl  Mean Cell Hemoglobin : 31.2 pg  Mean Cell Hemoglobin Concentration : 32.2 gm/dL    05-14    140  |  106  |  34<H>  ----------------------------<  170<H>  4.1   |  24  |  0.60  05-13    138  |  106  |  29<H>  ----------------------------<  163<H>  3.5   |  20<L>  |  0.55    Ca    8.3<L>      14 May 2020 23:54  Ca    8.9      13 May 2020 20:22  Phos  3.2     05-14  Phos  2.8     05-13  Mg     1.9     05-14  Mg     2.0     05-13    TPro  5.2<L>  /  Alb  3.0<L>  /  TBili  0.3  /  DBili  0.1  /  AST  40  /  ALT  60<H>  /  AlkPhos  51  05-14  TPro  5.7<L>  /  Alb  3.3  /  TBili  0.4  /  DBili  x   /  AST  82<H>  /  ALT  63<H>  /  AlkPhos  43  05-13    TELEMETRY: AFL w/ VR ~140bpm      ECH(5/13): AF    PREVIOUS DIAGNOSTIC TESTING:    Echocardiogram(5/12/2020):  Conclusions:  1. Calcified aortic valve with decreased opening. Peak  transaortic valve gradient equals 34 mm Hg, mean  transaortic valve gradient equals 18 mm Hg, estimated  aortic valve area equals 1.3 sqcm (by continuity equation),  aortic valve velocity time integral equals 62 cm,  consistent with moderate aortic stenosis. Dimensionless  index = 0.42, consistent with moderate aortic stenosis.  Mild aortic regurgitation.  2. Increased relative wall thickness with normal left  ventricular mass index, consistent with concentric left  ventricular remodeling.  3. Hyperdynamic left ventricular systolic function.  4. Moderate diastolic dysfunction (Stage II).  5. The right ventricle is not well visualized; grossly  normal right ventricular systolic function.  6. Estimated right ventricular systolic pressure equals 37  mm Hg, assuming right atrial pressure equals 8 mm Hg,  consistent with borderline pulmonary hypertension.  *** No previous Echo exam.    	  ASSESSMENT/PLAN: 	  83y/o female h/o HTN, HLD, CVA (2013) p/w left basal ganglia hemorrhage with mass effect & midline shift s/p left sided craniectomy w/ hematoma evacuation 5/9/2020 c/b developed AF/AFL w/ RVR on 5/13 ~7pm.    1. AF/AFL w/ RVR since 5/13  2. Left basal ganglia hemorrhage s/p left sided craniectomy w/ hematoma evacuation 5/9  3. HTN  4. Mod AS  5. Prior CVA (2013)    --Patient is not a candidate for DCCV given inability to AC with left basal ganglia hemorrhage. Recommend a rate control strategy  --D/C amiodarone  --Start digoxin--w/ load of 0.5mg now then in 6hrs give 0.25mg then in another 6hrs 0.25mg. After loading would start dig 0.25mg daily thereafter   --Continue dilt gtt. Start metoprolol 12.5mg Q8h. Titrate up as tolerated to achieve rate control  --Continue to monitor on telemetry    Alisa Morin PA-C  [phone]

## 2020-05-15 NOTE — PROGRESS NOTE ADULT - SUBJECTIVE AND OBJECTIVE BOX
Subjective: Patient seen and examined. No new events except as noted.     SUBJECTIVE/ROS:  Due to altered mental status, subjective information were not able to be obtained from the patient. History was obtained, to the extent possible, from review of the chart and collateral sources of information.       MEDICATIONS:  MEDICATIONS  (STANDING):  albuterol/ipratropium for Nebulization 3 milliLiter(s) Nebulizer every 6 hours  aMIOdarone    Tablet 200 milliGRAM(s) Oral every 12 hours  chlorhexidine 4% Liquid 1 Application(s) Topical <User Schedule>  dexAMETHasone     Tablet   Oral   dexAMETHasone     Tablet 2 milliGRAM(s) Oral every 12 hours  diltiazem Infusion 5 mG/Hr (5 mL/Hr) IV Continuous <Continuous>  doxazosin 2 milliGRAM(s) Oral at bedtime  enoxaparin Injectable 40 milliGRAM(s) SubCutaneous <User Schedule>  levETIRAcetam  IVPB 500 milliGRAM(s) IV Intermittent every 12 hours  phenylephrine    Infusion 1 MICROgram(s)/kG/Min (30.8 mL/Hr) IV Continuous <Continuous>  polyethylene glycol 3350 17 Gram(s) Oral every 12 hours  senna 2 Tablet(s) Oral at bedtime      PHYSICAL EXAM:  T(C): 36.4 (05-15-20 @ 08:00), Max: 37 (05-14-20 @ 23:00)  HR: 139 (05-15-20 @ 11:30) (98 - 141)  BP: -- 90/60  RR: 12 (05-15-20 @ 11:30) (8 - 41)  SpO2: 97% (05-15-20 @ 11:30) (94% - 100%)  Wt(kg): --  I&O's Summary    14 May 2020 07:01  -  15 May 2020 07:00  --------------------------------------------------------  IN: 2236.9 mL / OUT: 1005 mL / NET: 1231.9 mL    15 May 2020 07:01  -  15 May 2020 11:40  --------------------------------------------------------  IN: 715 mL / OUT: 50 mL / NET: 665 mL            JVP: Normal  Neck: supple  Lung: clear   CV: S1 S2 , Murmur:  Abd: soft  Ext: No edema  Psych: flat affect  Skin: normal``    LABS/DATA:    CARDIAC MARKERS:                                11.3   6.59  )-----------( 160      ( 14 May 2020 23:54 )             35.1     05-14    140  |  106  |  34<H>  ----------------------------<  170<H>  4.1   |  24  |  0.60    Ca    8.3<L>      14 May 2020 23:54  Phos  3.2     05-14  Mg     1.9     05-14    TPro  5.2<L>  /  Alb  3.0<L>  /  TBili  0.3  /  DBili  0.1  /  AST  40  /  ALT  60<H>  /  AlkPhos  51  05-14    proBNP:   Lipid Profile:   HgA1c:   TSH:     TELE:  EKG:

## 2020-05-15 NOTE — PROGRESS NOTE ADULT - SUBJECTIVE AND OBJECTIVE BOX
SUBJECTIVE:     Patient examined at the bedside on the morning of 5/15/20    PAST MEDICAL & SURGICAL HISTORY:  HTN (hypertension)    MEDICATIONS  (STANDING):  albuterol/ipratropium for Nebulization 3 milliLiter(s) Nebulizer every 6 hours  aMIOdarone    Tablet 200 milliGRAM(s) Oral every 12 hours  chlorhexidine 4% Liquid 1 Application(s) Topical <User Schedule>  dexAMETHasone     Tablet   Oral   dexAMETHasone     Tablet 2 milliGRAM(s) Oral every 12 hours  diltiazem    Tablet 30 milliGRAM(s) Oral every 6 hours  doxazosin 2 milliGRAM(s) Oral at bedtime  enoxaparin Injectable 40 milliGRAM(s) SubCutaneous <User Schedule>  levETIRAcetam  IVPB 500 milliGRAM(s) IV Intermittent every 12 hours  polyethylene glycol 3350 17 Gram(s) Oral every 12 hours  senna 2 Tablet(s) Oral at bedtime    MEDICATIONS  (PRN):  acetaminophen   Tablet .. 650 milliGRAM(s) Oral every 6 hours PRN Temp greater or equal to 38C (100.4F), Mild Pain (1 - 3)  ondansetron Injectable 4 milliGRAM(s) IV Push every 6 hours PRN Nausea and/or Vomiting  oxyCODONE    Solution 5 milliGRAM(s) Oral every 4 hours PRN Moderate Pain (4 - 6)  oxyCODONE    Solution 10 milliGRAM(s) Oral every 4 hours PRN Severe Pain (7 - 10)    ALLERGIES/INTOLERANCES:  Allergies  No Known Allergies    Intolerances    VITALS & EXAMINATION:  Vital Signs Last 24 Hrs  T(C): 36.4 (15 May 2020 08:00), Max: 37 (14 May 2020 23:00)  T(F): 97.6 (15 May 2020 08:00), Max: 98.6 (14 May 2020 23:00)  HR: 138 (15 May 2020 09:00) (98 - 138)  BP: --  BP(mean): --  RR: 13 (15 May 2020 09:00) (8 - 41)  SpO2: 99% (15 May 2020 09:00) (94% - 100%)    General:  Constitutional: Obese Female, appears stated age, in no apparent distress including pain  Head: Normocephalic & atraumatic.    Neurological   MS: Eyes closed, Awakens to tactile stimuli    LABORATORY:  CBC                       11.3   6.59  )-----------( 160      ( 14 May 2020 23:54 )             35.1     Chem 05-14    140  |  106  |  34<H>  ----------------------------<  170<H>  4.1   |  24  |  0.60    Ca    8.3<L>      14 May 2020 23:54  Phos  3.2     05-14  Mg     1.9     05-14    TPro  5.2<L>  /  Alb  3.0<L>  /  TBili  0.3  /  DBili  0.1  /  AST  40  /  ALT  60<H>  /  AlkPhos  51  05-14    LFTs LIVER FUNCTIONS - ( 14 May 2020 23:54 )  Alb: 3.0 g/dL / Pro: 5.2 g/dL / ALK PHOS: 51 U/L / ALT: 60 U/L / AST: 40 U/L / GGT: x           Coagulopathy   Lipid Panel 05-10 Chol 165  HDL 50 Trig 76  A1c   Cardiac enzymes     U/A   CSF  Immunological  Other    STUDIES & IMAGING:  Studies (EKG, EEG, EMG, etc):     Radiology (XR, CT, MR, U/S, TTE/ALYSSA):    < from: CT Head No Cont (05.11.20 @ 10:27) >    IMPRESSION: No change in moderate residual parenchymal hemorrhage in the left frontal parenchyma and basal ganglia compared with 5/10/2020. No change in mass effect.    < end of copied text >

## 2020-05-15 NOTE — PROGRESS NOTE ADULT - SUBJECTIVE AND OBJECTIVE BOX
Patient seen and examined at bedside.    --Anticoagulation--  enoxaparin Injectable 40 milliGRAM(s) SubCutaneous <User Schedule>    T(C): 36.7 (05-15-20 @ 03:00), Max: 37 (05-14-20 @ 23:00)  HR: 136 (05-15-20 @ 04:45) (95 - 136)  BP: --  RR: 13 (05-15-20 @ 04:45) (8 - 75)  SpO2: 95% (05-15-20 @ 04:45) (94% - 100%)  Wt(kg): --    Exam:  EO spont, pupils 2 mm R b/l, not FC,   RUE weakly wds, RLE WD, LUE AG, LLE AG

## 2020-05-15 NOTE — PROGRESS NOTE ADULT - ASSESSMENT
s/p larry evacuation    - MRI pending   - TTF to stroke neurology when stable   - Dex 2q12 taper to off in 2 days   - Incision checks.  Staples out on POD 14

## 2020-05-15 NOTE — PROGRESS NOTE ADULT - ASSESSMENT
afib with RVR  new onset  off a/c due to IPH  now in aflutter with 2:1 conduction   cont amio, change to PO as BP is low   DC losartan   carizem in fusion   agree with pressers for shock     Mod AS  monitor clinically for fluid overload   HR control     IPH   fu with nsx

## 2020-05-15 NOTE — PROGRESS NOTE ADULT - SUBJECTIVE AND OBJECTIVE BOX
SUBJECTIVE:     SUMMARY:   87 year-old woman with history of hypertension and hyperlipidemia presented to [facility] on 5/9/20 with headache and syncopal episode. She was noted to be minimally responsive, plegic on the right and hypertensive to SBP 170s. She was intubated and stared on both propofol and nicardipine drips. 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 She was transferred to [facility] for further care.   Upon arrival to [facility], her exam was notable for eye opening, no command following, right arm localization, right leg withdrawal and purposeful left sided movements. After extensive discussion with the family regarding goals of care, she was brought to the operating room for emergent clot evacuation.      Vital Signs Last 24 Hrs  Vital Signs Last 24 Hrs  T(C): 36.7 (05-15-20 @ 16:00), Max: 37 (05-14-20 @ 23:00)  T(F): 98.1 (05-15-20 @ 16:00), Max: 98.6 (05-14-20 @ 23:00)  HR: 103 (05-15-20 @ 19:00) (87 - 142)  BP: 121/59 (05-15-20 @ 19:00) (88/65 - 147/66)  BP(mean): 83 (05-15-20 @ 19:00) (73 - 89)  RR: 14 (05-15-20 @ 19:00) (10 - 41)  SpO2: 96% (05-15-20 @ 19:00) (89% - 99%)    PHYSICAL EXAM:    Neuro exam: Awake, EO spont, Lt gaze pref, pupils 2 mm R b/l, not FC, aphasic, mimics, RUE nothing, RLE TF, LUE AG, LLE AG        LABS:  05-14    140  |  106  |  34<H>  ----------------------------<  170<H>  4.1   |  24  |  0.60    Ca    8.3<L>      14 May 2020 23:54  Phos  3.2     05-14  Mg     1.9     05-14    TPro  5.2<L>  /  Alb  3.0<L>  /  TBili  0.3  /  DBili  0.1  /  AST  40  /  ALT  60<H>  /  AlkPhos  51  05-14                          11.3   6.59  )-----------( 160      ( 14 May 2020 23:54 )             35.1     IMAGING: reviewed    MEDICATIONS  (STANDING):  albuterol/ipratropium for Nebulization 3 milliLiter(s) Nebulizer every 6 hours  chlorhexidine 4% Liquid 1 Application(s) Topical <User Schedule>  dexAMETHasone     Tablet   Oral   dexAMETHasone     Tablet 2 milliGRAM(s) Oral every 12 hours  digoxin    Elixir 500 MICROGram(s) Oral once  digoxin    Elixir 250 MICROGram(s) Oral once  diltiazem Infusion 5 mG/Hr (5 mL/Hr) IV Continuous <Continuous>  doxazosin 2 milliGRAM(s) Oral at bedtime  enoxaparin Injectable 40 milliGRAM(s) SubCutaneous <User Schedule>  levETIRAcetam  IVPB 500 milliGRAM(s) IV Intermittent every 12 hours  metoprolol tartrate 12.5 milliGRAM(s) Oral every 8 hours  phenylephrine    Infusion 1 MICROgram(s)/kG/Min (30.8 mL/Hr) IV Continuous <Continuous>  polyethylene glycol 3350 17 Gram(s) Oral every 12 hours  senna 2 Tablet(s) Oral at bedtime    MEDICATIONS  (PRN):  acetaminophen   Tablet .. 650 milliGRAM(s) Oral every 6 hours PRN Temp greater or equal to 38C (100.4F), Mild Pain (1 - 3)  ondansetron Injectable 4 milliGRAM(s) IV Push every 6 hours PRN Nausea and/or Vomiting  oxyCODONE    Solution 5 milliGRAM(s) Oral every 4 hours PRN Moderate Pain (4 - 6)  oxyCODONE    Solution 10 milliGRAM(s) Oral every 4 hours PRN Severe Pain (7 - 10)  sodium chloride 3%  Inhalation 3 milliLiter(s) Inhalation every 6 hours PRN secretions    DIET: NPO with tube feeds (Jevity 1.2) SUBJECTIVE:     SUMMARY:   87 year-old woman with history of hypertension and hyperlipidemia presented to [facility] on 5/9/20 with headache and syncopal episode. She was noted to be minimally responsive, plegic on the right and hypertensive to SBP 170s. She was intubated and stared on both propofol and nicardipine drips. 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 She was transferred to [facility] for further care.   Upon arrival to [facility], her exam was notable for eye opening, no command following, right arm localization, right leg withdrawal and purposeful left sided movements. After extensive discussion with the family regarding goals of care, she was brought to the operating room for emergent clot evacuation.      Vital Signs Last 24 Hrs  Vital Signs Last 24 Hrs  T(C): 36.7 (05-15-20 @ 16:00), Max: 37 (05-14-20 @ 23:00)  T(F): 98.1 (05-15-20 @ 16:00), Max: 98.6 (05-14-20 @ 23:00)  HR: 103 (05-15-20 @ 19:00) (87 - 142)  BP: 121/59 (05-15-20 @ 19:00) (88/65 - 147/66)  BP(mean): 83 (05-15-20 @ 19:00) (73 - 89)  RR: 14 (05-15-20 @ 19:00) (10 - 41)  SpO2: 96% (05-15-20 @ 19:00) (89% - 99%)    PHYSICAL EXAM:    Neuro exam: Awake, EO spont, Lt gaze pref, pupils 2 mm R b/l, not FC, aphasic, RUE nothing, RLE TF, LUE AG, LLE AG        LABS:  05-14    140  |  106  |  34<H>  ----------------------------<  170<H>  4.1   |  24  |  0.60    Ca    8.3<L>      14 May 2020 23:54  Phos  3.2     05-14  Mg     1.9     05-14    TPro  5.2<L>  /  Alb  3.0<L>  /  TBili  0.3  /  DBili  0.1  /  AST  40  /  ALT  60<H>  /  AlkPhos  51  05-14                          11.3   6.59  )-----------( 160      ( 14 May 2020 23:54 )             35.1     IMAGING: reviewed    MEDICATIONS  (STANDING):  albuterol/ipratropium for Nebulization 3 milliLiter(s) Nebulizer every 6 hours  chlorhexidine 4% Liquid 1 Application(s) Topical <User Schedule>  dexAMETHasone     Tablet   Oral   dexAMETHasone     Tablet 2 milliGRAM(s) Oral every 12 hours  digoxin    Elixir 500 MICROGram(s) Oral once  digoxin    Elixir 250 MICROGram(s) Oral once  diltiazem Infusion 5 mG/Hr (5 mL/Hr) IV Continuous <Continuous>  doxazosin 2 milliGRAM(s) Oral at bedtime  enoxaparin Injectable 40 milliGRAM(s) SubCutaneous <User Schedule>  levETIRAcetam  IVPB 500 milliGRAM(s) IV Intermittent every 12 hours  metoprolol tartrate 12.5 milliGRAM(s) Oral every 8 hours  phenylephrine    Infusion 1 MICROgram(s)/kG/Min (30.8 mL/Hr) IV Continuous <Continuous>  polyethylene glycol 3350 17 Gram(s) Oral every 12 hours  senna 2 Tablet(s) Oral at bedtime    MEDICATIONS  (PRN):  acetaminophen   Tablet .. 650 milliGRAM(s) Oral every 6 hours PRN Temp greater or equal to 38C (100.4F), Mild Pain (1 - 3)  ondansetron Injectable 4 milliGRAM(s) IV Push every 6 hours PRN Nausea and/or Vomiting  oxyCODONE    Solution 5 milliGRAM(s) Oral every 4 hours PRN Moderate Pain (4 - 6)  oxyCODONE    Solution 10 milliGRAM(s) Oral every 4 hours PRN Severe Pain (7 - 10)  sodium chloride 3%  Inhalation 3 milliLiter(s) Inhalation every 6 hours PRN secretions    DIET: NPO with tube feeds (Jevity 1.2)

## 2020-05-15 NOTE — PROGRESS NOTE ADULT - ASSESSMENT
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, status post Priyanka evacuation, post-operative day 6    Neuro:   steroids for cerebral edema, taper per NSGY  levetiracetam per NSGY  delirium precautions  mobilize, PT/OT    Respiratory:   maintain SpO2 > 92%  secretions: hypertonic nebs, chest PT    CV:  -160mmHg  hypertension: losartan  hyperlipemia: statin   atrial fibrillation: switch to amiodarone gtt but monitor LFTs closely (may need to discontinue if LFTs increase)    GI:    speech and swallow, but likely will need a PEG  tube feeds    Renal:   urinary retention: improved with doxazosin    Endocrine:   euglycemia   A1C 5.5     Heme/Onc:               DVT ppx: [] SQH [x] SQL [X] venodynes bilaterally    ID: afebrile     CODE STATUS:  [x] Full Code 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, status post Priyanka evacuation, post-operative day 6    Neuro:   steroids for cerebral edema, taper per NSGY  levetiracetam per NSGY  delirium precautions  mobilize, PT/OT    Respiratory:   maintain SpO2 > 92%  secretions: hypertonic nebs, chest PT    CV:  -160mmHg  hyperlipemia: statin   atrial fibrillation/flutter: complicated by hypotension likely due to poor filling from tachycardia - diltiazem gtt with phenylephrine gtt for BP support    GI:    speech and swallow, but likely will need a PEG  tube feeds    Renal:   urinary retention: improved with doxazosin, but will hold this in light of hypotension    Endocrine:   euglycemia   A1C 5.5     Heme/Onc:               DVT ppx: [] SQH [x] SQL [X] venodynes bilaterally    ID: afebrile     CODE STATUS:  [x] Full Code 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, status post Priyanka evacuation, post-operative day 6    Neuro:   steroids for cerebral edema, taper per NSGY  levetiracetam per NSGY  delirium precautions  mobilize, PT/OT    Respiratory:   maintain SpO2 > 92%  secretions: hypertonic nebs, chest PT    CV:  -160mmHg  hyperlipemia: statin   atrial fibrillation/flutter: complicated by hypotension likely due to poor filling from tachycardia - diltiazem gtt with phenylephrine gtt for BP support    GI:    speech and swallow, but likely will need a PEG  tube feeds    Renal:   urinary retention: improved with doxazosin, but will hold this in light of hypotension    Endocrine:   euglycemia   A1C 5.5     Heme/Onc:               DVT ppx: [] SQH [x] SQL [X] venodynes bilaterally    ID: afebrile     CODE STATUS:  [x] Full Code     DISPO:  [x] ICU    Patient now critically ill given cardiac instability requiring multiple drips for BP and heart rate management

## 2020-05-15 NOTE — PROGRESS NOTE ADULT - COVID-19 NEGATIVE LAB RESULT
COVID-19 ruled out

## 2020-05-15 NOTE — PROGRESS NOTE ADULT - SUBJECTIVE AND OBJECTIVE BOX
SUMMARY:   87 year-old woman with history of hypertension and hyperlipidemia presented to [facility] on 5/9/20 with headache and syncopal episode. She was noted to be minimally responsive, plegic on the right and hypertensive to SBP 170s. She was intubated and stared on both propofol and nicardipine drips. 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 She was transferred to [facility] for further care.   Upon arrival to [facility], her exam was notable for eye opening, no command following, right arm localization, right leg withdrawal and purposeful left sided movements. After extensive discussion with the family regarding goals of care, she was brought to the operating room for emergent clot evacuation.    HOSPITAL COURSE:  5/9: left Priyanka clot evacuation  5/11: extubated  5/13: afib with RVR status post diltiazem gtt with moderate response  5/14: started on amiodarone gtt    24 HOUR EVENTS:  put on amiodarone gtt for afib    ADMISSION SCORES:  ICH Score: 3    VITALS/DATA/ORDERS: [x] Reviewed    DEVICES: [x] right radial a-line [x] OGT    PHYSICAL EXAM:  Neurological: eyes open, regards, does not follow commands, non-verbal, aphasic, pupils 2mm reactive, left gaze preference, right facial, +cough, left-side spontaneous, RUE at times spontaneously antigravity but mostly extension when stimulated, RLE brisk triple flexion (though at times some more complex movements)  Cardiovascular: irregular  Respiratory: coarse, no stridor, good air entry  Gastrointestinal: soft, non-distended, non-tender  Extremities: warm, dry  Incision/Wound: dressing c/d/i SUMMARY:   87 year-old woman with history of hypertension and hyperlipidemia presented to [facility] on 5/9/20 with headache and syncopal episode. She was noted to be minimally responsive, plegic on the right and hypertensive to SBP 170s. She was intubated and stared on both propofol and nicardipine drips. 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 She was transferred to [facility] for further care.   Upon arrival to [facility], her exam was notable for eye opening, no command following, right arm localization, right leg withdrawal and purposeful left sided movements. After extensive discussion with the family regarding goals of care, she was brought to the operating room for emergent clot evacuation.    HOSPITAL COURSE:  5/9: left Priyanka clot evacuation  5/11: extubated  5/13: afib with RVR status post diltiazem gtt with moderate response  5/14: started on amiodarone gtt    24 HOUR EVENTS:  Hypotensive on amiodarone drip so switched to enteral. However, persistent tachycardia with hypotension so placed on phenylephrine gtt with plan to start diltiazem gtt for rate control.     ADMISSION SCORES:  ICH Score: 3    VITALS/DATA/ORDERS: [x] Reviewed    DEVICES: [x] right radial a-line [x] OGT    PHYSICAL EXAM:  Neurological: eyes open, regards, does not follow commands, non-verbal, aphasic, pupils 2mm reactive, left gaze preference, right facial, +cough, left-side spontaneous, RUE extension, RLE brisk triple flexion (though at times some more complex movements)  Cardiovascular: irregular  Respiratory: coarse, no stridor, good air entry  Gastrointestinal: soft, non-distended, non-tender  Extremities: warm, dry  Incision/Wound: dressing c/d/i

## 2020-05-15 NOTE — PROVIDER CONTACT NOTE (OTHER) - ASSESSMENT
patient afebrile, not in distress. /66. Patient remains on amiodarone drip @ 0.5mcg/min; drip scheduled to stop at 05/15 @ 0900.

## 2020-05-16 LAB
ALBUMIN SERPL ELPH-MCNC: 2.9 G/DL — LOW (ref 3.3–5)
ALP SERPL-CCNC: 63 U/L — SIGNIFICANT CHANGE UP (ref 40–120)
ALT FLD-CCNC: 53 U/L — HIGH (ref 10–45)
ANION GAP SERPL CALC-SCNC: 11 MMOL/L — SIGNIFICANT CHANGE UP (ref 5–17)
ANION GAP SERPL CALC-SCNC: 12 MMOL/L — SIGNIFICANT CHANGE UP (ref 5–17)
AST SERPL-CCNC: 26 U/L — SIGNIFICANT CHANGE UP (ref 10–40)
BILIRUB DIRECT SERPL-MCNC: 0.1 MG/DL — SIGNIFICANT CHANGE UP (ref 0–0.2)
BILIRUB INDIRECT FLD-MCNC: 0.2 MG/DL — SIGNIFICANT CHANGE UP (ref 0.2–1)
BILIRUB SERPL-MCNC: 0.3 MG/DL — SIGNIFICANT CHANGE UP (ref 0.2–1.2)
BUN SERPL-MCNC: 27 MG/DL — HIGH (ref 7–23)
BUN SERPL-MCNC: 33 MG/DL — HIGH (ref 7–23)
CALCIUM SERPL-MCNC: 8.3 MG/DL — LOW (ref 8.4–10.5)
CALCIUM SERPL-MCNC: 8.6 MG/DL — SIGNIFICANT CHANGE UP (ref 8.4–10.5)
CHLORIDE SERPL-SCNC: 104 MMOL/L — SIGNIFICANT CHANGE UP (ref 96–108)
CHLORIDE SERPL-SCNC: 105 MMOL/L — SIGNIFICANT CHANGE UP (ref 96–108)
CO2 SERPL-SCNC: 23 MMOL/L — SIGNIFICANT CHANGE UP (ref 22–31)
CO2 SERPL-SCNC: 24 MMOL/L — SIGNIFICANT CHANGE UP (ref 22–31)
CREAT SERPL-MCNC: 0.6 MG/DL — SIGNIFICANT CHANGE UP (ref 0.5–1.3)
CREAT SERPL-MCNC: 0.6 MG/DL — SIGNIFICANT CHANGE UP (ref 0.5–1.3)
DIGOXIN SERPL-MCNC: 1.9 NG/ML — SIGNIFICANT CHANGE UP (ref 0.8–2)
GLUCOSE SERPL-MCNC: 137 MG/DL — HIGH (ref 70–99)
GLUCOSE SERPL-MCNC: 172 MG/DL — HIGH (ref 70–99)
HCT VFR BLD CALC: 37.8 % — SIGNIFICANT CHANGE UP (ref 34.5–45)
HGB BLD-MCNC: 12.6 G/DL — SIGNIFICANT CHANGE UP (ref 11.5–15.5)
MAGNESIUM SERPL-MCNC: 1.9 MG/DL — SIGNIFICANT CHANGE UP (ref 1.6–2.6)
MAGNESIUM SERPL-MCNC: 2 MG/DL — SIGNIFICANT CHANGE UP (ref 1.6–2.6)
MCHC RBC-ENTMCNC: 32.1 PG — SIGNIFICANT CHANGE UP (ref 27–34)
MCHC RBC-ENTMCNC: 33.3 GM/DL — SIGNIFICANT CHANGE UP (ref 32–36)
MCV RBC AUTO: 96.4 FL — SIGNIFICANT CHANGE UP (ref 80–100)
NRBC # BLD: 0 /100 WBCS — SIGNIFICANT CHANGE UP (ref 0–0)
PHOSPHATE SERPL-MCNC: 3.2 MG/DL — SIGNIFICANT CHANGE UP (ref 2.5–4.5)
PHOSPHATE SERPL-MCNC: 4 MG/DL — SIGNIFICANT CHANGE UP (ref 2.5–4.5)
PLATELET # BLD AUTO: 196 K/UL — SIGNIFICANT CHANGE UP (ref 150–400)
POTASSIUM SERPL-MCNC: 4.3 MMOL/L — SIGNIFICANT CHANGE UP (ref 3.5–5.3)
POTASSIUM SERPL-MCNC: 4.4 MMOL/L — SIGNIFICANT CHANGE UP (ref 3.5–5.3)
POTASSIUM SERPL-SCNC: 4.3 MMOL/L — SIGNIFICANT CHANGE UP (ref 3.5–5.3)
POTASSIUM SERPL-SCNC: 4.4 MMOL/L — SIGNIFICANT CHANGE UP (ref 3.5–5.3)
PROT SERPL-MCNC: 5.3 G/DL — LOW (ref 6–8.3)
RBC # BLD: 3.92 M/UL — SIGNIFICANT CHANGE UP (ref 3.8–5.2)
RBC # FLD: 13.1 % — SIGNIFICANT CHANGE UP (ref 10.3–14.5)
SARS-COV-2 RNA SPEC QL NAA+PROBE: SIGNIFICANT CHANGE UP
SODIUM SERPL-SCNC: 139 MMOL/L — SIGNIFICANT CHANGE UP (ref 135–145)
SODIUM SERPL-SCNC: 140 MMOL/L — SIGNIFICANT CHANGE UP (ref 135–145)
WBC # BLD: 9.61 K/UL — SIGNIFICANT CHANGE UP (ref 3.8–10.5)
WBC # FLD AUTO: 9.61 K/UL — SIGNIFICANT CHANGE UP (ref 3.8–10.5)

## 2020-05-16 PROCEDURE — 99292 CRITICAL CARE ADDL 30 MIN: CPT | Mod: 24

## 2020-05-16 PROCEDURE — 99291 CRITICAL CARE FIRST HOUR: CPT | Mod: 24

## 2020-05-16 RX ORDER — DILTIAZEM HCL 120 MG
90 CAPSULE, EXT RELEASE 24 HR ORAL EVERY 6 HOURS
Refills: 0 | Status: DISCONTINUED | OUTPATIENT
Start: 2020-05-16 | End: 2020-05-21

## 2020-05-16 RX ORDER — DILTIAZEM HCL 120 MG
60 CAPSULE, EXT RELEASE 24 HR ORAL EVERY 6 HOURS
Refills: 0 | Status: DISCONTINUED | OUTPATIENT
Start: 2020-05-16 | End: 2020-05-16

## 2020-05-16 RX ORDER — DIGOXIN 250 MCG
125 TABLET ORAL DAILY
Refills: 0 | Status: DISCONTINUED | OUTPATIENT
Start: 2020-05-16 | End: 2020-05-22

## 2020-05-16 RX ORDER — DILTIAZEM HCL 120 MG
30 CAPSULE, EXT RELEASE 24 HR ORAL ONCE
Refills: 0 | Status: COMPLETED | OUTPATIENT
Start: 2020-05-16 | End: 2020-05-16

## 2020-05-16 RX ORDER — DIGOXIN 250 MCG
0.25 TABLET ORAL DAILY
Refills: 0 | Status: DISCONTINUED | OUTPATIENT
Start: 2020-05-16 | End: 2020-05-16

## 2020-05-16 RX ORDER — DIGOXIN 250 MCG
0.12 TABLET ORAL DAILY
Refills: 0 | Status: DISCONTINUED | OUTPATIENT
Start: 2020-05-16 | End: 2020-05-16

## 2020-05-16 RX ORDER — DILTIAZEM HCL 120 MG
10 CAPSULE, EXT RELEASE 24 HR ORAL ONCE
Refills: 0 | Status: COMPLETED | OUTPATIENT
Start: 2020-05-16 | End: 2020-05-16

## 2020-05-16 RX ADMIN — LEVETIRACETAM 400 MILLIGRAM(S): 250 TABLET, FILM COATED ORAL at 05:46

## 2020-05-16 RX ADMIN — Medication 3 MILLILITER(S): at 06:16

## 2020-05-16 RX ADMIN — Medication 3 MILLILITER(S): at 17:29

## 2020-05-16 RX ADMIN — Medication 60 MILLIGRAM(S): at 10:47

## 2020-05-16 RX ADMIN — Medication 5 MG/HR: at 07:00

## 2020-05-16 RX ADMIN — POLYETHYLENE GLYCOL 3350 17 GRAM(S): 17 POWDER, FOR SOLUTION ORAL at 05:44

## 2020-05-16 RX ADMIN — Medication 60 MILLIGRAM(S): at 17:27

## 2020-05-16 RX ADMIN — Medication 3 MILLILITER(S): at 12:33

## 2020-05-16 RX ADMIN — ENOXAPARIN SODIUM 40 MILLIGRAM(S): 100 INJECTION SUBCUTANEOUS at 17:27

## 2020-05-16 RX ADMIN — Medication 10 MILLIGRAM(S): at 18:52

## 2020-05-16 RX ADMIN — Medication 12.5 MILLIGRAM(S): at 22:38

## 2020-05-16 RX ADMIN — Medication 2 MILLIGRAM(S): at 05:46

## 2020-05-16 RX ADMIN — Medication 2 MILLIGRAM(S): at 22:38

## 2020-05-16 RX ADMIN — Medication 250 MICROGRAM(S): at 05:44

## 2020-05-16 RX ADMIN — CHLORHEXIDINE GLUCONATE 1 APPLICATION(S): 213 SOLUTION TOPICAL at 22:37

## 2020-05-16 RX ADMIN — POLYETHYLENE GLYCOL 3350 17 GRAM(S): 17 POWDER, FOR SOLUTION ORAL at 17:27

## 2020-05-16 RX ADMIN — Medication 12.5 MILLIGRAM(S): at 08:11

## 2020-05-16 RX ADMIN — Medication 12.5 MILLIGRAM(S): at 17:27

## 2020-05-16 RX ADMIN — Medication 125 MICROGRAM(S): at 10:48

## 2020-05-16 RX ADMIN — Medication 30 MILLIGRAM(S): at 18:52

## 2020-05-16 RX ADMIN — Medication 250 MICROGRAM(S): at 00:11

## 2020-05-16 RX ADMIN — LEVETIRACETAM 400 MILLIGRAM(S): 250 TABLET, FILM COATED ORAL at 17:27

## 2020-05-16 NOTE — PROGRESS NOTE ADULT - ASSESSMENT
PROCEDURE: Left Priyanka of evacuation of ICH     POD#7      Assessment:  Please Check When Present   []  GCS  E   V  M     Heart Failure: []Acute, [] acute on chronic , []chronic  Heart Failure:  [] Diastolic (HFpEF), [] Systolic (HFrEF), []Combined (HFpEF and HFrEF), [] RHF, [] Pulm HTN, [] Other    [] SCAR, [] ATN, [] AIN, [] other  [] CKD1, [] CKD2, [] CKD 3, [] CKD 4, [] CKD 5, []ESRD    Encephalopathy: [] Metabolic, [] Hepatic, [] toxic, [] Neurological, [] Other    Abnormal Nurtitional Status: [] malnurtition (see nutrition note), [ ]underweight: BMI < 19, [] morbid obesity: BMI >40, [] Cachexia    [] Sepsis  [] hypovolemic shock,[] cardiogenic shock, [] hemorrhagic shock, [] neuogenic shock  [] Acute Respiratory Failure  [X]Cerebral edema, [] Brain compression/ herniation,   [] Functional quadriplegia  [] Acute blood loss anemia        PLAN:   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, status post Priyanka evacuation, post-operative day 7    Neuro:   Neuro checks q4h   steroids for cerebral edema, taper per NSGY  levetiracetam per NSGY  delirium precautions  mobilize, PT/OT  Stroke following: accepted onto service but awaiting available bed    Respiratory:   room air  secretions: chest PT and sodium chloride 3% inhalation prn    CV:  -160mmHg  hypertension: losartan  hyperlipemia: statin   atrial fibrillation: Digoxin 125 micrograms PO QD, Cardizem 90 q6h (Cardiology following)  off phenylephrine and cardizem drip    GI:    speech and swallow, but likely will need a PEG  tube feeds  bowel regimen    Renal:   IVL  doxazosin held due to hypotension    Endocrine:   euglycemia   A1C 5.5     Heme/Onc:               DVT ppx: [] SQH [x] SQL [X] venodynes bilaterally    ID: afebrile       CODE STATUS:  [x] Full Code     DISPOSITION:  [x] ICU     [x] Patient is at high risk of neurologic deterioration/death due to: Large left basal ganglia hemorrhage, brain compression    Total critical care time 45 minutes

## 2020-05-16 NOTE — PROGRESS NOTE ADULT - ASSESSMENT
84 year old female h/o HTN, HLD, CVA (2013) p/w left basal ganglia hemorrhage with mass effect & midline shift s/p left sided craniectomy w/ hematoma evacuation 5/9/2020 c/b developed AF/AFL w/ RVR on 5/13 ~7pm.      1. AF/AFL since 5/13  2. Left basal ganglia hemorrhage s/p left sided craniectomy w/ hematoma evacuation 5/9  3. HTN  4. Mod AS  5. Prior CVA (2013)    --Currently in atrial flutter with 3:1 conduction with controlled ventricular rates 60-80's.   --Patient is not a candidate for DCCV given inability to AC with left basal ganglia hemorrhage. Recommend a rate control strategy  --Amiodarone d/c'd 5/15 due to can potentially chemically convert patient who is not on AC  --s/p Dig loaded, continue dig 0.25mg daily  --c/w metoprolol 12.5mg Q8h  --Transition from IV to PO diltiazem  --Patient is currently rate control, EPS will sign off, reconsult as needed     [phone]

## 2020-05-16 NOTE — PROGRESS NOTE ADULT - SUBJECTIVE AND OBJECTIVE BOX
Patient seen and examined at bedside.    --Anticoagulation--  enoxaparin Injectable 40 milliGRAM(s) SubCutaneous <User Schedule>    T(C): 36.9 (05-15-20 @ 19:15), Max: 36.9 (05-15-20 @ 19:15)  HR: 69 (05-16-20 @ 03:00) (69 - 142)  BP: 128/60 (05-16-20 @ 03:00) (88/65 - 147/66)  RR: 14 (05-16-20 @ 03:00) (10 - 41)  SpO2: 98% (05-16-20 @ 03:00) (89% - 99%)  Wt(kg): --    Exam:  EO spont, pupils 2 mm R b/l, not FC,   RUE localizes/spontaneous, RLE WD, LUE AG, LLE AG

## 2020-05-16 NOTE — PROGRESS NOTE ADULT - ASSESSMENT
s/p larry evacuation    - MRI pending   - Off Amio for Afib/A flutter. If stable TTF/stroke unit under stroke neurology    - Dex 2qD taper to off in 1 days   - Incision checks.  Staples out on POD 14

## 2020-05-16 NOTE — PROGRESS NOTE ADULT - ASSESSMENT
PAF  now in aflutter  HR much better at 3:1 conduction   can change cardizem to PO  cont Dig  check level in 2-3 days   off a/c due to bleed     Hypotension   can use midodrine to get off Raj    Mod AS  monitor clinically for fluid overload   HR control     IPH   fu with nsx

## 2020-05-16 NOTE — PROGRESS NOTE ADULT - ASSESSMENT
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, status post Priyanka evacuation, post-operative day 7    Neuro:   steroids for cerebral edema, tapered to off   levetiracetam per NSGY  delirium precautions  mobilize, PT/OT    Respiratory:   maintain SpO2 > 92%  secretions: hypertonic nebs, chest PT    CV:  -160mmHg  hyperlipemia: statin   atrial fibrillation/flutter: complicated by hypotension likely due to poor filling from tachycardia - off lauren now, cardizem 60 q6 po and digoxin 125 microgram daily and metoprolol 12  5 q8     GI:    speech and swallow, but likely will need a PEG  tube feeds    Renal:   urinary retention: improved with doxazosin    Endocrine:   euglycemia   A1C 5.5     Heme/Onc:               DVT ppx: [] SQH [x] SQL [X] venodynes bilaterally    ID: afebrile     CODE STATUS:  [x] Full Code     DISPO:  [x] ICU    Patient now critically ill given cardiac instability requiring multiple drips for BP and heart rate management     COVID-19 Negative Lab Result: will sent today   		    Critical Care Attestation:    Critical Care Attestation:  Attending: I have personally provided 45 minutes of critical care time excluding time spent on separate procedures. 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, status post Priyanka evacuation, post-operative day 7    Neuro:   steroids for cerebral edema, tapered to off   levetiracetam per NSGY  delirium precautions  mobilize, PT/OT    Respiratory:   maintain SpO2 > 92%  secretions: hypertonic nebs, chest PT    CV:  -160mmHg  hyperlipemia: statin   atrial fibrillation/flutter: complicated by hypotension likely due to poor filling from tachycardia - off lauren now, cardizem 60 q6 po and digoxin 125 microgram daily and metoprolol 12  5 q8     GI:    speech and swallow, but likely will need a PEG  tube feeds    Renal:   urinary retention: improved with doxazosin    Endocrine:   euglycemia   A1C 5.5     Heme/Onc:               DVT ppx: [] SQH [x] SQL [X] venodynes bilaterally    ID: afebrile     CODE STATUS:  [x] Full Code     DISPO:  [x] ICU  [x] Patient is at high risk of neurologic deterioration/death due to hemorrhage and herniation   Patient now critically ill given cardiac instability requiring multiple drips for BP and heart rate management     COVID-19 Negative Lab Result: will sent today   		    Critical Care Attestation:    Critical Care Attestation:  Attending: I have personally provided 45 minutes of critical care time excluding time spent on separate procedures.

## 2020-05-16 NOTE — PROGRESS NOTE ADULT - SUBJECTIVE AND OBJECTIVE BOX
Subjective: Patient seen and examined. No new events except as noted.     SUBJECTIVE/ROS:        MEDICATIONS:  MEDICATIONS  (STANDING):  albuterol/ipratropium for Nebulization 3 milliLiter(s) Nebulizer every 6 hours  chlorhexidine 4% Liquid 1 Application(s) Topical <User Schedule>  dexAMETHasone     Tablet   Oral   dexAMETHasone     Tablet 2 milliGRAM(s) Oral daily  digoxin    Elixir 0.125 MICROGram(s) Oral daily  diltiazem    Tablet 60 milliGRAM(s) Oral every 6 hours  diltiazem Infusion 5 mG/Hr (5 mL/Hr) IV Continuous <Continuous>  doxazosin 2 milliGRAM(s) Oral at bedtime  enoxaparin Injectable 40 milliGRAM(s) SubCutaneous <User Schedule>  levETIRAcetam  IVPB 500 milliGRAM(s) IV Intermittent every 12 hours  metoprolol tartrate 12.5 milliGRAM(s) Oral every 8 hours  polyethylene glycol 3350 17 Gram(s) Oral every 12 hours  senna 2 Tablet(s) Oral at bedtime      PHYSICAL EXAM:  T(C): 36.5 (05-16-20 @ 06:00), Max: 36.9 (05-15-20 @ 19:15)  HR: 71 (05-16-20 @ 09:00) (69 - 142)  BP: 126/61 (05-16-20 @ 04:00) (88/65 - 147/66)  RR: 12 (05-16-20 @ 09:00) (10 - 36)  SpO2: 99% (05-16-20 @ 09:00) (84% - 100%)  Wt(kg): --  I&O's Summary    15 May 2020 07:01  -  16 May 2020 07:00  --------------------------------------------------------  IN: 2835.7 mL / OUT: 850 mL / NET: 1985.7 mL    16 May 2020 07:01  -  16 May 2020 09:30  --------------------------------------------------------  IN: 220 mL / OUT: 0 mL / NET: 220 mL            JVP: Normal  Neck: supple  Lung: clear   CV: S1 S2 , Murmur:  Abd: soft  Ext: No edema  Psych: flat affect  Skin: normal``    LABS/DATA:    CARDIAC MARKERS:                                12.6   9.61  )-----------( 196      ( 16 May 2020 03:06 )             37.8     05-16    139  |  105  |  33<H>  ----------------------------<  172<H>  4.3   |  23  |  0.60    Ca    8.3<L>      16 May 2020 03:06  Phos  3.2     05-16  Mg     2.0     05-16    TPro  5.3<L>  /  Alb  2.9<L>  /  TBili  0.3  /  DBili  0.1  /  AST  26  /  ALT  53<H>  /  AlkPhos  63  05-16    proBNP:   Lipid Profile:   HgA1c:   TSH:     TELE:  EKG:

## 2020-05-16 NOTE — PROGRESS NOTE ADULT - SUBJECTIVE AND OBJECTIVE BOX
24H hour events: Pt is now in 3:1 atrial flutter with rates much improved.      MEDICATIONS:  digoxin    Elixir 125 MICROGram(s) Oral daily  diltiazem    Tablet 60 milliGRAM(s) Oral every 6 hours  diltiazem Infusion 5 mG/Hr IV Continuous <Continuous>  doxazosin 2 milliGRAM(s) Oral at bedtime  enoxaparin Injectable 40 milliGRAM(s) SubCutaneous <User Schedule>  metoprolol tartrate 12.5 milliGRAM(s) Oral every 8 hours  albuterol/ipratropium for Nebulization 3 milliLiter(s) Nebulizer every 6 hours  sodium chloride 3%  Inhalation 3 milliLiter(s) Inhalation every 6 hours PRN    acetaminophen   Tablet .. 650 milliGRAM(s) Oral every 6 hours PRN  levETIRAcetam  IVPB 500 milliGRAM(s) IV Intermittent every 12 hours  ondansetron Injectable 4 milliGRAM(s) IV Push every 6 hours PRN    polyethylene glycol 3350 17 Gram(s) Oral every 12 hours  senna 2 Tablet(s) Oral at bedtime    dexAMETHasone     Tablet   Oral   dexAMETHasone     Tablet 2 milliGRAM(s) Oral daily    chlorhexidine 4% Liquid 1 Application(s) Topical <User Schedule>      REVIEW OF SYSTEMS:  Complete 10point ROS negative.    PHYSICAL EXAM:  T(C): 36.5 (05-16-20 @ 06:00), Max: 36.9 (05-15-20 @ 19:15)  HR: 71 (05-16-20 @ 12:35) (69 - 142)  BP: 126/61 (05-16-20 @ 04:00) (88/65 - 147/66)  RR: 18 (05-16-20 @ 12:00) (11 - 36)  SpO2: 100% (05-16-20 @ 12:35) (84% - 100%)  Wt(kg): --  I&O's Summary    15 May 2020 07:01  -  16 May 2020 07:00  --------------------------------------------------------  IN: 2835.7 mL / OUT: 850 mL / NET: 1985.7 mL    16 May 2020 07:01  -  16 May 2020 12:40  --------------------------------------------------------  IN: 220 mL / OUT: 0 mL / NET: 220 mL        Appearance: NAD	  HEENT: Normal oral mucosa, NG tube feeding 	  Cardiovascular: Normal S1 S2, RRR  Respiratory: Lungs clear to auscultation	  Gastrointestinal:  Soft, Non-tender, + BS	  Skin: No rashes, No ecchymoses, No cyanosis	  Neurologic: Awake, opens eyes to stimulation, doesn't follow commands, mimics on left side, globally aphasic  Extremities: Normal range of motion, No clubbing, cyanosis or edema  Vascular: Peripheral pulses palpable 2+ bilaterally        LABS:	 	    CBC Full  -  ( 16 May 2020 03:06 )  WBC Count : 9.61 K/uL  Hemoglobin : 12.6 g/dL  Hematocrit : 37.8 %  Platelet Count - Automated : 196 K/uL  Mean Cell Volume : 96.4 fl  Mean Cell Hemoglobin : 32.1 pg  Mean Cell Hemoglobin Concentration : 33.3 gm/dL  Auto Neutrophil # : x  Auto Lymphocyte # : x  Auto Monocyte # : x  Auto Eosinophil # : x  Auto Basophil # : x  Auto Neutrophil % : x  Auto Lymphocyte % : x  Auto Monocyte % : x  Auto Eosinophil % : x  Auto Basophil % : x    05-16    139  |  105  |  33<H>  ----------------------------<  172<H>  4.3   |  23  |  0.60  05-14    140  |  106  |  34<H>  ----------------------------<  170<H>  4.1   |  24  |  0.60    Ca    8.3<L>      16 May 2020 03:06  Ca    8.3<L>      14 May 2020 23:54  Phos  3.2     05-16  Phos  3.2     05-14  Mg     2.0     05-16  Mg     1.9     05-14    TPro  5.3<L>  /  Alb  2.9<L>  /  TBili  0.3  /  DBili  0.1  /  AST  26  /  ALT  53<H>  /  AlkPhos  63  05-16  TPro  5.2<L>  /  Alb  3.0<L>  /  TBili  0.3  /  DBili  0.1  /  AST  40  /  ALT  60<H>  /  AlkPhos  51  05-14    Thyroid Stimulating Hormone, Serum (05.10.20 @ 00:57)    Thyroid Stimulating Hormone, Serum: 3.31 uIU/mL      TELEMETRY: Atrial flutter with 3:1 conduction, ventricular rates 60-80's  	      < from: Transthoracic Echocardiogram (05.12.20 @ 17:19) >  Dimensions:    Normal Values:  LA:     4.2    2.0 - 4.0 cm  Ao:     2.9    2.0 - 3.8 cm  SEPTUM: 1.1    0.6 - 1.2 cm  PWT:    1.1    0.6 - 1.1 cm  LVIDd:  4.0    3.0 - 5.6 cm  LVIDs:  2.0    1.8 - 4.0 cm  Derived variables:  LVMI: 76 g/m2  RWT: 0.55  Fractional short: 50 %  EF (Visual Estimate): 65-70 %  Doppler Peak Velocity (m/sec): MV=1.4 AoV=2.9  ------------------------------------------------------------------------  Observations:  Mitral Valve: Mitral annular calcification, otherwise  normal mitral valve. Minimal mitral regurgitation.  Aortic Valve/Aorta: Calcified aorticvalve with decreased  opening. Peak transaortic valve gradient equals 34 mm Hg,  mean transaortic valve gradient equals 18 mm Hg, estimated  aortic valve area equals 1.3 sqcm (by continuity equation),  aortic valve velocity time integral equals 62 cm,  consistent with moderate aortic stenosis. Dimensionless  index = 0.42, consistent with moderate aortic stenosis.  Mild aortic regurgitation.  Peak left ventricular outflow  tract gradient equals 4 mm Hg, mean gradient is equal to 3  mm Hg, LVOT velocity time integral equals 26 cm.  Aortic Root: 2.9 cm.  Left Atrium: Severely dilated left atrium.  LA volume index  = 54 cc/m2.  Left Ventricle: Hyperdynamic left ventricular systolic  function. Increased relative wall thickness with normal  left ventricular mass index, consistent with concentric  left ventricular remodeling. Moderate diastolic dysfunction  (Stage II).  Right Heart: Normal right atrium. The right ventricle is  not well visualized; grossly normal right ventricular  systolic function. Normal tricuspid valve. Mild tricuspid  regurgitation. Pulmonic valve not well visualized, probably  normal. Minimal pulmonic regurgitation.  Pericardium/Pleura: Normal pericardium with no pericardial  effusion.Hemodynamic: Estimated right atrial pressure is 8 mm Hg.  Estimated right ventricular systolic pressure equals 37 mm  Hg, assuming right atrial pressure equals 8 mm Hg,  consistent with borderline pulmonary hypertension.  ------------------------------------------------------------------------  Conclusions:  1. Calcified aortic valve with decreased opening. Peak  transaortic valve gradient equals 34 mm Hg, mean  transaortic valve gradient equals 18 mm Hg, estimated  aortic valve area equals 1.3 sqcm (by continuity equation),  aortic valve velocity time integral equals 62 cm,  consistent with moderate aortic stenosis. Dimensionless  index = 0.42, consistent with moderate aortic stenosis.  Mild aortic regurgitation.  2. Increased relative wall thickness with normal left  ventricular mass index, consistent with concentric left  ventricular remodeling.  3. Hyperdynamic left ventricular systolic function.  4. Moderate diastolic dysfunction (Stage II).  5. The right ventricle is not well visualized; grossly  normal right ventricular systolic function.  6. Estimated right ventricular systolic pressure equals 37  mm Hg, assuming right atrial pressure equals 8 mm Hg,  consistent with borderline pulmonary hypertension.    < end of copied text >

## 2020-05-16 NOTE — PROGRESS NOTE ADULT - SUBJECTIVE AND OBJECTIVE BOX
SUBJECTIVE:   87 year-old woman with history of hypertension and hyperlipidemia presented to [facility] on 5/9/20 with headache and syncopal episode. She was noted to be minimally responsive, plegic on the right and hypertensive to SBP 170s. She was intubated and stared on both propofol and nicardipine drips. 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 She was transferred to [facility] for further care.   Upon arrival to [facility], her exam was notable for eye opening, no command following, right arm localization, right leg withdrawal and purposeful left sided movements. After extensive discussion with the family regarding goals of care, she was brought to the operating room for emergent clot evacuation.    HOSPITAL COURSE:  5/9: left Priyanka clot evacuation  5/11: extubated  5/13: afib with RVR status post diltiazem gtt with moderate response  5/14: started on amiodarone gtt  5/15 started on cardizem drip   24 HOUR EVENTS:  rate controlled with cardizem drip and digoxin.       Vital Signs Last 24 Hrs  T(C): 36.5 (05-16-20 @ 14:00), Max: 36.9 (05-15-20 @ 19:15)  T(F): 97.7 (05-16-20 @ 14:00), Max: 98.5 (05-16-20 @ 08:00)  HR: 76 (05-16-20 @ 17:31) (69 - 129)  BP: 126/61 (05-16-20 @ 04:00) (114/65 - 135/63)  BP(mean): 87 (05-16-20 @ 04:00) (83 - 91)  RR: 11 (05-16-20 @ 17:00) (11 - 36)  SpO2: 100% (05-16-20 @ 17:31) (84% - 100%)    PHYSICAL EXAM:    Neuro exam: Awake, EO spont, left gaze pref, pupils 2R, globally aphasic, R facial, no FC, RUE trace movements, RLE TF, LUE 4/5, LLE 3/5      LABS:                          12.6   9.61  )-----------( 196      ( 16 May 2020 03:06 )             37.8    05-16    139  |  105  |  33<H>  ----------------------------<  172<H>  4.3   |  23  |  0.60    Ca    8.3<L>      16 May 2020 03:06  Phos  3.2     05-16  Mg     2.0     05-16    TPro  5.3<L>  /  Alb  2.9<L>  /  TBili  0.3  /  DBili  0.1  /  AST  26  /  ALT  53<H>  /  AlkPhos  63  05-16      05-15 @ 07:01 - 05-16 @ 07:00  --------------------------------------------------------  IN: 2835.7 mL / OUT: 850 mL / NET: 1985.7 mL    05-16 @ 07:01  - 05-16 @ 18:25  --------------------------------------------------------  IN: 1205 mL / OUT: 900 mL / NET: 305 mL        IMAGING: reviewed    MEDICATIONS:    Neuro:  acetaminophen   Tablet .. 650 milliGRAM(s) Oral every 6 hours PRN Temp greater or equal to 38C (100.4F), Mild Pain (1 - 3)  levETIRAcetam  IVPB 500 milliGRAM(s) IV Intermittent every 12 hours  ondansetron Injectable 4 milliGRAM(s) IV Push every 6 hours PRN Nausea and/or Vomiting    Cardiac:  digoxin    Elixir 125 MICROGram(s) Oral daily  diltiazem    Tablet 90 milliGRAM(s) Oral every 6 hours  diltiazem    Tablet 30 milliGRAM(s) Oral once  diltiazem Injectable 10 milliGRAM(s) IV Push once  doxazosin 2 milliGRAM(s) Oral at bedtime  metoprolol tartrate 12.5 milliGRAM(s) Oral every 8 hours    Pulm:  albuterol/ipratropium for Nebulization 3 milliLiter(s) Nebulizer every 6 hours  sodium chloride 3%  Inhalation 3 milliLiter(s) Inhalation every 6 hours PRN secretions    GI/:  polyethylene glycol 3350 17 Gram(s) Oral every 12 hours  senna 2 Tablet(s) Oral at bedtime    Other:   chlorhexidine 4% Liquid 1 Application(s) Topical <User Schedule>  dexAMETHasone     Tablet   Oral   dexAMETHasone     Tablet 2 milliGRAM(s) Oral daily  enoxaparin Injectable 40 milliGRAM(s) SubCutaneous <User Schedule>        DIET: NPO tube feeds (Jevity 1.2)

## 2020-05-16 NOTE — PROGRESS NOTE ADULT - SUBJECTIVE AND OBJECTIVE BOX
INTERVAL HPI/OVERNIGHT EVENTS:  87 year-old woman with history of hypertension and hyperlipidemia presented to [facility] on 5/9/20 with headache and syncopal episode. She was noted to be minimally responsive, plegic on the right and hypertensive to SBP 170s. She was intubated and stared on both propofol and nicardipine drips. 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 She was transferred to [facility] for further care.   Upon arrival to [facility], her exam was notable for eye opening, no command following, right arm localization, right leg withdrawal and purposeful left sided movements. After extensive discussion with the family regarding goals of care, she was brought to the operating room for emergent clot evacuation.    HOSPITAL COURSE:  5/9: left Priyanka clot evacuation  5/11: extubated  5/13: afib with RVR status post diltiazem gtt with moderate response  5/14: started on amiodarone gtt  5/15 started on cardizem drip   24 HOUR EVENTS:  rate controlled with cardizem drip and digoxin.   PRESSORS: [ ] YES [x ] NO  WHICH:  DOSE:    ANTIBIOTICS:                  DATE STARTED:  ANTIBIOTICS:                  DATE STARTED:  ANTIBIOTICS:                  DATE STARTED:      Antimicrobial:    Cardiovascular:  digoxin    Elixir 125 MICROGram(s) Oral daily  diltiazem    Tablet 60 milliGRAM(s) Oral every 6 hours  diltiazem Infusion 5 mG/Hr IV Continuous <Continuous>  doxazosin 2 milliGRAM(s) Oral at bedtime  metoprolol tartrate 12.5 milliGRAM(s) Oral every 8 hours    Pulmonary:  albuterol/ipratropium for Nebulization 3 milliLiter(s) Nebulizer every 6 hours  sodium chloride 3%  Inhalation 3 milliLiter(s) Inhalation every 6 hours PRN    Hematalogic:  enoxaparin Injectable 40 milliGRAM(s) SubCutaneous <User Schedule>    Other:  acetaminophen   Tablet .. 650 milliGRAM(s) Oral every 6 hours PRN  chlorhexidine 4% Liquid 1 Application(s) Topical <User Schedule>  dexAMETHasone     Tablet   Oral   dexAMETHasone     Tablet 2 milliGRAM(s) Oral daily  levETIRAcetam  IVPB 500 milliGRAM(s) IV Intermittent every 12 hours  ondansetron Injectable 4 milliGRAM(s) IV Push every 6 hours PRN  polyethylene glycol 3350 17 Gram(s) Oral every 12 hours  senna 2 Tablet(s) Oral at bedtime    acetaminophen   Tablet .. 650 milliGRAM(s) Oral every 6 hours PRN  albuterol/ipratropium for Nebulization 3 milliLiter(s) Nebulizer every 6 hours  chlorhexidine 4% Liquid 1 Application(s) Topical <User Schedule>  dexAMETHasone     Tablet   Oral   dexAMETHasone     Tablet 2 milliGRAM(s) Oral daily  digoxin    Elixir 125 MICROGram(s) Oral daily  diltiazem    Tablet 60 milliGRAM(s) Oral every 6 hours  diltiazem Infusion 5 mG/Hr IV Continuous <Continuous>  doxazosin 2 milliGRAM(s) Oral at bedtime  enoxaparin Injectable 40 milliGRAM(s) SubCutaneous <User Schedule>  levETIRAcetam  IVPB 500 milliGRAM(s) IV Intermittent every 12 hours  metoprolol tartrate 12.5 milliGRAM(s) Oral every 8 hours  ondansetron Injectable 4 milliGRAM(s) IV Push every 6 hours PRN  polyethylene glycol 3350 17 Gram(s) Oral every 12 hours  senna 2 Tablet(s) Oral at bedtime  sodium chloride 3%  Inhalation 3 milliLiter(s) Inhalation every 6 hours PRN    Drug Dosing Weight  Height (cm): 167.6 (09 May 2020 19:40)  Weight (kg): 82.2 (09 May 2020 20:07)  BMI (kg/m2): 29.3 (09 May 2020 20:07)  BSA (m2): 1.92 (09 May 2020 20:07)    CENTRAL LINE: [ ] YES [ x] NO  LOCATION:   DATE INSERTED:  REMOVE: [ ] YES [ ] NO  EXPLAIN:    CUETO: [ ] YES [x ] NO    DATE INSERTED:  REMOVE:  [ ] YES [ ] NO  EXPLAIN:    A-LINE:  [x ] YES [ ] NO  LOCATION:   DATE INSERTED:  REMOVE:  [ ] YES [ ] NO  EXPLAIN:    PAST MEDICAL & SURGICAL HISTORY:  HTN (hypertension)      REVIEW OF SYSTEMS:    CONSTITUTIONAL: No fever, weight loss, or fatigue  EYES: No eye pain, visual disturbances, or discharge  ENMT:  No difficulty hearing, tinnitus, vertigo; No sinus or throat pain  NECK: No pain or stiffness  BREASTS: No pain, masses, or nipple discharge  RESPIRATORY: No cough, wheezing, chills or hemoptysis; No shortness of breath  CARDIOVASCULAR: No chest pain, palpitations, dizziness, or leg swelling  GASTROINTESTINAL: No abdominal or epigastric pain. No nausea, vomiting, or hematemesis; No diarrhea or constipation. No melena or hematochezia.  GENITOURINARY: No dysuria, frequency, hematuria, or incontinence  NEUROLOGICAL: No headaches, memory loss, loss of strength, numbness, or tremors  SKIN: No itching, burning, rashes, or lesions   LYMPH NODES: No enlarged glands  ENDOCRINE: No heat or cold intolerance; No hair loss  MUSCULOSKELETAL: No joint pain or swelling; No muscle, back, or extremity pain  PSYCHIATRIC: No depression, anxiety, mood swings, or difficulty sleeping  HEME/LYMPH: No easy bruising, or bleeding gums  ALLERGY AND IMMUNOLOGIC: No hives or eczema      ICU Vital Signs Last 24 Hrs  T(C): 36.5 (16 May 2020 06:00), Max: 36.9 (15 May 2020 19:15)  T(F): 97.7 (16 May 2020 06:00), Max: 98.4 (15 May 2020 19:15)  HR: 71 (16 May 2020 09:00) (69 - 142)  BP: 126/61 (16 May 2020 04:00) (88/65 - 147/66)  BP(mean): 87 (16 May 2020 04:00) (73 - 91)  ABP: 140/69 (16 May 2020 09:00) (68/53 - 140/69)  ABP(mean): 94 (16 May 2020 09:00) (61 - 101)  RR: 12 (16 May 2020 09:00) (11 - 36)  SpO2: 99% (16 May 2020 09:00) (84% - 100%)          I&O's Detail    15 May 2020 07:01  -  16 May 2020 07:00  --------------------------------------------------------  IN:    diltiazem Infusion: 215 mL    Enteral Tube Flush: 150 mL    ns in tub fed  [id number]: 1320 mL    phenylephrine   Infusion: 650.7 mL    Sodium Chloride 0.9% IV Bolus: 500 mL  Total IN: 2835.7 mL    OUT:    Intermittent Catheterization - Urethral: 700 mL    Voided: 150 mL  Total OUT: 850 mL    Total NET: 1985.7 mL      16 May 2020 07:01  -  16 May 2020 12:10  --------------------------------------------------------  IN:    diltiazem Infusion: 20 mL    Enteral Tube Flush: 90 mL    ns in tub fed  [id number]: 110 mL  Total IN: 220 mL    OUT:  Total OUT: 0 mL    Total NET: 220 mL              PHYSICAL EXAM:    GENERAL: NAD, well-groomed, well-developed  HEAD:  Atraumatic, Normocephalic  EYES: EOMI, PERRLA, conjunctiva and sclera clear  ENMT: No tonsillar erythema, exudates, or enlargement; Moist mucous membranes, Good dentition, No lesions  NECK: Supple, No JVD, Normal thyroid  NERVOUS SYSTEM:  awake opens eyes to stimulation, doesn't follow commands, mimics on left side, globally aphasic, left upper 4/5 , left lower 3/5 , right side -upper plegic and tf on right lower  CHEST/LUNG: Clear to percussion bilaterally; No rales, rhonchi, wheezing, or rubs  HEART: Regular rate and rhythm; No murmurs, rubs, or gallops  ABDOMEN: Soft, Nontender, Nondistended; Bowel sounds present  EXTREMITIES:  2+ Peripheral Pulses, No clubbing, cyanosis, or edema  LYMPH: No lymphadenopathy noted  SKIN: No rashes or lesions      LABS:  CBC Full  -  ( 16 May 2020 03:06 )  WBC Count : 9.61 K/uL  RBC Count : 3.92 M/uL  Hemoglobin : 12.6 g/dL  Hematocrit : 37.8 %  Platelet Count - Automated : 196 K/uL  Mean Cell Volume : 96.4 fl  Mean Cell Hemoglobin : 32.1 pg  Mean Cell Hemoglobin Concentration : 33.3 gm/dL  Auto Neutrophil # : x  Auto Lymphocyte # : x  Auto Monocyte # : x  Auto Eosinophil # : x  Auto Basophil # : x  Auto Neutrophil % : x  Auto Lymphocyte % : x  Auto Monocyte % : x  Auto Eosinophil % : x  Auto Basophil % : x    05-16    139  |  105  |  33<H>  ----------------------------<  172<H>  4.3   |  23  |  0.60    Ca    8.3<L>      16 May 2020 03:06  Phos  3.2     05-16  Mg     2.0     05-16    TPro  5.3<L>  /  Alb  2.9<L>  /  TBili  0.3  /  DBili  0.1  /  AST  26  /  ALT  53<H>  /  AlkPhos  63  05-16            RADIOLOGY & ADDITIONAL STUDIES:    CRITICAL CARE TIME SPENT:

## 2020-05-17 RX ORDER — SODIUM CHLORIDE 9 MG/ML
250 INJECTION INTRAMUSCULAR; INTRAVENOUS; SUBCUTANEOUS ONCE
Refills: 0 | Status: COMPLETED | OUTPATIENT
Start: 2020-05-17 | End: 2020-05-17

## 2020-05-17 RX ORDER — MAGNESIUM SULFATE 500 MG/ML
1 VIAL (ML) INJECTION ONCE
Refills: 0 | Status: COMPLETED | OUTPATIENT
Start: 2020-05-17 | End: 2020-05-17

## 2020-05-17 RX ADMIN — Medication 90 MILLIGRAM(S): at 11:23

## 2020-05-17 RX ADMIN — LEVETIRACETAM 400 MILLIGRAM(S): 250 TABLET, FILM COATED ORAL at 06:55

## 2020-05-17 RX ADMIN — Medication 90 MILLIGRAM(S): at 00:15

## 2020-05-17 RX ADMIN — SODIUM CHLORIDE 500 MILLILITER(S): 9 INJECTION INTRAMUSCULAR; INTRAVENOUS; SUBCUTANEOUS at 22:05

## 2020-05-17 RX ADMIN — ENOXAPARIN SODIUM 40 MILLIGRAM(S): 100 INJECTION SUBCUTANEOUS at 17:35

## 2020-05-17 RX ADMIN — Medication 125 MICROGRAM(S): at 06:55

## 2020-05-17 RX ADMIN — Medication 12.5 MILLIGRAM(S): at 06:55

## 2020-05-17 RX ADMIN — SODIUM CHLORIDE 500 MILLILITER(S): 9 INJECTION INTRAMUSCULAR; INTRAVENOUS; SUBCUTANEOUS at 22:30

## 2020-05-17 RX ADMIN — Medication 2 MILLIGRAM(S): at 06:55

## 2020-05-17 RX ADMIN — Medication 12.5 MILLIGRAM(S): at 13:40

## 2020-05-17 RX ADMIN — LEVETIRACETAM 400 MILLIGRAM(S): 250 TABLET, FILM COATED ORAL at 17:36

## 2020-05-17 RX ADMIN — Medication 90 MILLIGRAM(S): at 06:54

## 2020-05-17 RX ADMIN — SENNA PLUS 2 TABLET(S): 8.6 TABLET ORAL at 22:59

## 2020-05-17 RX ADMIN — Medication 100 GRAM(S): at 01:31

## 2020-05-17 RX ADMIN — Medication 90 MILLIGRAM(S): at 22:59

## 2020-05-17 NOTE — PROGRESS NOTE ADULT - ASSESSMENT
PAF  Aflutter  HR stable   cont Dig  check level in 2-3 days   off a/c due to bleed     Hypotension   can use midodrine to get off Raj    Mod AS  monitor clinically for fluid overload   HR control     IPH   fu with nsx PAF  Aflutter  HR stable   cont cardizem, metoprolol   cont Dig  check level in 2-3 days   off a/c due to bleed     Hypotension   stable     Mod AS  monitor clinically for fluid overload   HR control     IPH   fu with nsx

## 2020-05-17 NOTE — PROGRESS NOTE ADULT - ASSESSMENT
ASSESSMENT: 87F with PMHx of HTN, HLD, presented with large left frontal/basal ganglia IPH, etiology unknown, possiblility of underlying hyptension. S/P larry clot removal. Now with FAF/A flutter.    NEURO: neurologically without acute change, continue close monitoring for neurologic deterioration as patient with cerebral edema, mass effect, and brain compression, she remains bedbound with immobility due to frailty and functional quadriplegia, maintain normotension, home statin therapy if applicable, MR brain upon resolution of hemorrhage for further evaluation, PMR: [facility].    ANTITHROMBOTIC THERAPY: none at this time in setting of ICH and increased risk for bleeding, pending outpatient follow up- clinical course and radiological stability will need to determine timeline for initiation of AC vs. Watchman in setting of atrial fibrillation.     PULMONARY: CXR enlarged heart, lungs clear, protecting airway, saturating well     CARDIOVASCULAR:  TTE: ef 65-70%, moderate AS, mild AR, LV remodeling, moderate stage II diastolic dysfunciton, borderline pulmonary htn , cardiac monitoring to ensure rate control, regimen as recommended by Dr. Nix.                            SBP goal: <140mMHg    GASTROINTESTINAL:  dysphagia screen failed, NGT in place, goals of care pending re: long term enteral access.     Diet: NGT	    RENAL: BUN/Cr without acute change, maintain adequate hydration, good urine output      Na Goal: Greater than 135     Garcia: n    HEMATOLOGY: H/H without acute change, no active bleeding, Platelets 196     DVT ppx: Heparin s.c [] LMWH [x]     ID: afebrile, no leukocytosis, no si/sx of infection         DISPOSITION: [facility]       CORE MEASURES:        Admission NIHSS:      TPA: [] YES [x] NO      LDL/HDL:100/50     Depression Screen: NA     Statin Therapy: as noted     Dysphagia Screen: [] PASS [x] FAIL     Smoking [] YES [x] NO      Afib [x] YES [] NO     Stroke Education [x] YES [] NO    Obtain screening lower extremity venous ultrasound in patients who meet 1 or more of the following criteria as patient is high risk for DVT/PE on admission:   [] History of DVT/PE  []Hypercoagulable states (Factor V Leiden, Cancer, OCP, etc. )  []Prolonged immobility (hemiplegia/hemiparesis/post operative or any other extended immobilization)  [] Transferred from outside facility (Rehab or Long term care)  [] Age </= to 50 ASSESSMENT: 87F with PMHx of HTN, HLD, presented with large left frontal/basal ganglia IPH, etiology unknown, possiblility of underlying hyptension. S/P larry clot removal. Now with FAF/A flutter.    NEURO: neurologically without acute change, continue close monitoring for neurologic deterioration as patient with cerebral edema, mass effect, and brain compression, she remains bedbound with immobility due to frailty and functional quadriplegia, maintain normotension, home statin therapy if applicable, MR brain upon resolution of hemorrhage for further evaluation, PMR: GINA.    ANTITHROMBOTIC THERAPY: none at this time in setting of ICH and increased risk for bleeding, pending outpatient follow up- clinical course and radiological stability will need to determine timeline for initiation of AC vs. Watchman in setting of atrial fibrillation.     PULMONARY: CXR enlarged heart, lungs clear, protecting airway, saturating well     CARDIOVASCULAR:  TTE: ef 65-70%, moderate AS, mild AR, LV remodeling, moderate stage II diastolic dysfunciton, borderline pulmonary htn , cardiac monitoring to ensure rate control, regimen as recommended by Dr. Zimmer.                            SBP goal: <140mMHg    GASTROINTESTINAL:  dysphagia screen failed, NGT in place, goals of care pending re: long term enteral access.     Diet: NGT	    RENAL: BUN/Cr without acute change, maintain adequate hydration, good urine output      Na Goal: Greater than 135     Garcia: n    HEMATOLOGY: H/H without acute change, no active bleeding, Platelets 196     DVT ppx: Heparin s.c [] LMWH [x]     ID: afebrile, no leukocytosis, no si/sx of infection         DISPOSITION: [facility]       CORE MEASURES:        Admission NIHSS:      TPA: [] YES [x] NO      LDL/HDL:100/50     Depression Screen: NA     Statin Therapy: as noted     Dysphagia Screen: [] PASS [x] FAIL     Smoking [] YES [x] NO      Afib [x] YES [] NO     Stroke Education [x] YES [] NO    Obtain screening lower extremity venous ultrasound in patients who meet 1 or more of the following criteria as patient is high risk for DVT/PE on admission:   [] History of DVT/PE  []Hypercoagulable states (Factor V Leiden, Cancer, OCP, etc. )  []Prolonged immobility (hemiplegia/hemiparesis/post operative or any other extended immobilization)  [] Transferred from outside facility (Rehab or Long term care)  [] Age </= to 50      1700 addendum patient s/p lopressor and cardizem with SBP 93/59 and prior low 100s, no acute neurological changes, due to recent hypotension in setting of both regimens will d/c lopressor and continue to monitor, d/w dr Jayna zimmer - should hypotension persist will initiate midodrine 5mg tid

## 2020-05-17 NOTE — PROGRESS NOTE ADULT - SUBJECTIVE AND OBJECTIVE BOX
Patient seen and examined at bedside.    --Anticoagulation--    T(C): 36.8 (05-17-20 @ 03:00), Max: 36.9 (05-16-20 @ 08:00)  HR: 72 (05-17-20 @ 03:00) (69 - 113)  BP: 126/61 (05-16-20 @ 04:00) (126/61 - 126/61)  RR: 11 (05-17-20 @ 03:00) (11 - 36)  SpO2: 98% (05-17-20 @ 03:00) (84% - 100%)  Wt(kg): --    Exam:  EO spont, pupils 2 mm R b/l, not FC, but regarding   RUE localizes/spontaneous, RLE WD, LUE AG, LLE AG

## 2020-05-17 NOTE — CHART NOTE - NSCHARTNOTEFT_GEN_A_CORE
accepted to stroke serviceby dr. gonzalez from neurosurgery.  transferred last night with cardizem gtt converted to oral   and no pressor requirements.

## 2020-05-17 NOTE — PROGRESS NOTE ADULT - SUBJECTIVE AND OBJECTIVE BOX
Subjective: Patient seen and examined. No new events except as noted.     SUBJECTIVE/ROS:  Due to altered mental status, subjective information were not able to be obtained from the patient. History was obtained, to the extent possible, from review of the chart and collateral sources of information.        MEDICATIONS:  MEDICATIONS  (STANDING):  digoxin    Elixir 125 MICROGram(s) Oral daily  diltiazem    Tablet 90 milliGRAM(s) Oral every 6 hours  doxazosin 2 milliGRAM(s) Oral at bedtime  enoxaparin Injectable 40 milliGRAM(s) SubCutaneous <User Schedule>  levETIRAcetam  IVPB 500 milliGRAM(s) IV Intermittent every 12 hours  metoprolol tartrate 12.5 milliGRAM(s) Oral every 8 hours  polyethylene glycol 3350 17 Gram(s) Oral every 12 hours  senna 2 Tablet(s) Oral at bedtime      PHYSICAL EXAM:  T(C): 36.7 (05-17-20 @ 06:00), Max: 36.8 (05-17-20 @ 03:00)  HR: 74 (05-17-20 @ 08:00) (71 - 113)  BP: 111/69 (05-17-20 @ 08:00) (111/69 - 128/88)  RR: 16 (05-17-20 @ 08:00) (11 - 30)  SpO2: 96% (05-17-20 @ 08:00) (96% - 100%)  Wt(kg): --  I&O's Summary    16 May 2020 07:01  -  17 May 2020 07:00  --------------------------------------------------------  IN: 1680 mL / OUT: 2000 mL / NET: -320 mL            JVP: Normal  Neck: supple  Lung: clear   CV: S1 S2 , Murmur:  Abd: soft  Ext: No edema  Psych: flat affect  Skin: normal``    LABS/DATA:    CARDIAC MARKERS:                                12.6   9.61  )-----------( 196      ( 16 May 2020 03:06 )             37.8     05-16    140  |  104  |  27<H>  ----------------------------<  137<H>  4.4   |  24  |  0.60    Ca    8.6      16 May 2020 22:50  Phos  4.0     05-16  Mg     1.9     05-16    TPro  5.3<L>  /  Alb  2.9<L>  /  TBili  0.3  /  DBili  0.1  /  AST  26  /  ALT  53<H>  /  AlkPhos  63  05-16    proBNP:   Lipid Profile:   HgA1c:   TSH:     TELE:  EKG:

## 2020-05-17 NOTE — PROGRESS NOTE ADULT - SUBJECTIVE AND OBJECTIVE BOX
THE PATIENT WAS SEEN AND EXAMINED BY ME WITH THE HOUSESTAFF AND STROKE TEAM DURING MORNING ROUNDS.   HPI:  87F w/ PMH of HTN, HLD, transferred from [facility] for NSG eval after found to have large L. BG IPH. Patient found to have severe headache with subsequent syncopal episode. Found to be minimally responsive and so initially intubated for airway protection. Was hypertensive,  started on Caredene and prop gtt. Cardene discontinue prior to transfer. Upon arrival, with sedation held she was unable to open her eyes, not following commands, moving spontaneously on the L, RUE loc, RLE WDs. After extensive discussion with the family regarding goals of care, she was brought to the operating room for emergent clot evacuation now extubated.       SUBJECTIVE: No events overnight.  No new neurologic complaints.      acetaminophen   Tablet .. 650 milliGRAM(s) Oral every 6 hours PRN  digoxin    Elixir 125 MICROGram(s) Oral daily  diltiazem    Tablet 90 milliGRAM(s) Oral every 6 hours  doxazosin 2 milliGRAM(s) Oral at bedtime  enoxaparin Injectable 40 milliGRAM(s) SubCutaneous <User Schedule>  levETIRAcetam  IVPB 500 milliGRAM(s) IV Intermittent every 12 hours  metoprolol tartrate 12.5 milliGRAM(s) Oral every 8 hours  ondansetron Injectable 4 milliGRAM(s) IV Push every 6 hours PRN  polyethylene glycol 3350 17 Gram(s) Oral every 12 hours  senna 2 Tablet(s) Oral at bedtime  sodium chloride 3%  Inhalation 3 milliLiter(s) Inhalation every 6 hours PRN      PHYSICAL EXAM:   Vital Signs Last 24 Hrs  T(C): 36.7 (17 May 2020 06:00), Max: 36.8 (17 May 2020 03:00)  T(F): 98.1 (17 May 2020 06:00), Max: 98.2 (17 May 2020 03:00)  HR: 74 (17 May 2020 08:00) (71 - 113)  BP: 111/69 (17 May 2020 08:00) (111/69 - 128/88)  BP(mean): 80 (17 May 2020 08:00) (80 - 99)  RR: 16 (17 May 2020 08:00) (11 - 30)  SpO2: 96% (17 May 2020 08:00) (96% - 100%)    General: No acute distress  HEENT: eyes open, right gaze palsy , R HHA  Abdomen: Soft, nontender, nondistended   Extremities: No edema    NEUROLOGICAL EXAM:  Mental status: Awake, eyes open and somewhat minimally attentive then closes again, not following commands, no verbal output   Cranial Nerves: right facial droop, right gaze palsy, Rhha   Motor exam: left arm moves spontaneously against gravity within bed, LLE with some flexion in bed, right hemiplegia with only trace flexion  Sensation: decreased on right   Coordination/ Gait: gait not assessed   LABS:                        12.6   9.61  )-----------( 196      ( 16 May 2020 03:06 )             37.8    05-16    140  |  104  |  27<H>  ----------------------------<  137<H>  4.4   |  24  |  0.60    Ca    8.6      16 May 2020 22:50  Phos  4.0     05-16  Mg     1.9     05-16    TPro  5.3<L>  /  Alb  2.9<L>  /  TBili  0.3  /  DBili  0.1  /  AST  26  /  ALT  53<H>  /  AlkPhos  63  05-16        IMAGING: Reviewed by me.   CT Head No Cont (05.11.20)  No change in moderate residual parenchymal hemorrhage in the left frontal parenchyma and basal ganglia compared with 5/10/2020. No change in mass effect.    (05.09.20)  NONCONTRAST CT BRAIN: Large intraparenchymal hemorrhage involving the left basal ganglia, frontal lobe and medial temporal lobe as described above. Near complete effacement of the left lateral ventricle. There is 0.4 cm of rightward midline shift.    CTA BRAIN AND NECK: Patent cervical circulation.  No identified aneurysm or AVM. Decreased size of the left middle cerebral artery may be due to distal branch occlusion or compression by the intraparenchymal hemorrhage.

## 2020-05-17 NOTE — PROGRESS NOTE ADULT - ASSESSMENT
84F s/p larry evacuation    - MRI pending   - Off Amio for Afib/A flutter. Awaiting bed in stroke unit  - Dex 2qD taper to off in 1 days   - Incision checks.  Staples out on POD 14

## 2020-05-18 LAB
ANION GAP SERPL CALC-SCNC: 12 MMOL/L — SIGNIFICANT CHANGE UP (ref 5–17)
BUN SERPL-MCNC: 31 MG/DL — HIGH (ref 7–23)
CALCIUM SERPL-MCNC: 8.6 MG/DL — SIGNIFICANT CHANGE UP (ref 8.4–10.5)
CHLORIDE SERPL-SCNC: 104 MMOL/L — SIGNIFICANT CHANGE UP (ref 96–108)
CO2 SERPL-SCNC: 24 MMOL/L — SIGNIFICANT CHANGE UP (ref 22–31)
CREAT SERPL-MCNC: 0.64 MG/DL — SIGNIFICANT CHANGE UP (ref 0.5–1.3)
GLUCOSE SERPL-MCNC: 128 MG/DL — HIGH (ref 70–99)
HCT VFR BLD CALC: 40.2 % — SIGNIFICANT CHANGE UP (ref 34.5–45)
HGB BLD-MCNC: 12.9 G/DL — SIGNIFICANT CHANGE UP (ref 11.5–15.5)
MAGNESIUM SERPL-MCNC: 2.2 MG/DL — SIGNIFICANT CHANGE UP (ref 1.6–2.6)
MCHC RBC-ENTMCNC: 31 PG — SIGNIFICANT CHANGE UP (ref 27–34)
MCHC RBC-ENTMCNC: 32.1 GM/DL — SIGNIFICANT CHANGE UP (ref 32–36)
MCV RBC AUTO: 96.6 FL — SIGNIFICANT CHANGE UP (ref 80–100)
NRBC # BLD: 0 /100 WBCS — SIGNIFICANT CHANGE UP (ref 0–0)
PHOSPHATE SERPL-MCNC: 4.6 MG/DL — HIGH (ref 2.5–4.5)
PLATELET # BLD AUTO: 190 K/UL — SIGNIFICANT CHANGE UP (ref 150–400)
POTASSIUM SERPL-MCNC: 4.3 MMOL/L — SIGNIFICANT CHANGE UP (ref 3.5–5.3)
POTASSIUM SERPL-SCNC: 4.3 MMOL/L — SIGNIFICANT CHANGE UP (ref 3.5–5.3)
RBC # BLD: 4.16 M/UL — SIGNIFICANT CHANGE UP (ref 3.8–5.2)
RBC # FLD: 12.6 % — SIGNIFICANT CHANGE UP (ref 10.3–14.5)
SODIUM SERPL-SCNC: 140 MMOL/L — SIGNIFICANT CHANGE UP (ref 135–145)
WBC # BLD: 6.7 K/UL — SIGNIFICANT CHANGE UP (ref 3.8–10.5)
WBC # FLD AUTO: 6.7 K/UL — SIGNIFICANT CHANGE UP (ref 3.8–10.5)

## 2020-05-18 PROCEDURE — 99233 SBSQ HOSP IP/OBS HIGH 50: CPT

## 2020-05-18 PROCEDURE — 99231 SBSQ HOSP IP/OBS SF/LOW 25: CPT

## 2020-05-18 RX ADMIN — ENOXAPARIN SODIUM 40 MILLIGRAM(S): 100 INJECTION SUBCUTANEOUS at 17:50

## 2020-05-18 RX ADMIN — POLYETHYLENE GLYCOL 3350 17 GRAM(S): 17 POWDER, FOR SOLUTION ORAL at 17:50

## 2020-05-18 RX ADMIN — Medication 90 MILLIGRAM(S): at 05:32

## 2020-05-18 RX ADMIN — SENNA PLUS 2 TABLET(S): 8.6 TABLET ORAL at 22:06

## 2020-05-18 RX ADMIN — Medication 125 MICROGRAM(S): at 05:32

## 2020-05-18 RX ADMIN — Medication 90 MILLIGRAM(S): at 13:17

## 2020-05-18 RX ADMIN — LEVETIRACETAM 400 MILLIGRAM(S): 250 TABLET, FILM COATED ORAL at 17:54

## 2020-05-18 RX ADMIN — Medication 2 MILLIGRAM(S): at 22:06

## 2020-05-18 RX ADMIN — POLYETHYLENE GLYCOL 3350 17 GRAM(S): 17 POWDER, FOR SOLUTION ORAL at 05:32

## 2020-05-18 RX ADMIN — Medication 90 MILLIGRAM(S): at 17:50

## 2020-05-18 RX ADMIN — LEVETIRACETAM 400 MILLIGRAM(S): 250 TABLET, FILM COATED ORAL at 05:32

## 2020-05-18 NOTE — PROGRESS NOTE ADULT - SUBJECTIVE AND OBJECTIVE BOX
Subjective: Patient seen and examined. No new events except as noted.     SUBJECTIVE/ROS:    ROS limited     MEDICATIONS:  MEDICATIONS  (STANDING):  digoxin    Elixir 125 MICROGram(s) Oral daily  diltiazem    Tablet 90 milliGRAM(s) Oral every 6 hours  doxazosin 2 milliGRAM(s) Oral at bedtime  enoxaparin Injectable 40 milliGRAM(s) SubCutaneous <User Schedule>  levETIRAcetam  IVPB 500 milliGRAM(s) IV Intermittent every 12 hours  polyethylene glycol 3350 17 Gram(s) Oral every 12 hours  senna 2 Tablet(s) Oral at bedtime      PHYSICAL EXAM:  T(C): 37.1 (05-18-20 @ 07:07), Max: 37.1 (05-18-20 @ 07:07)  HR: 73 (05-18-20 @ 10:00) (73 - 135)  BP: 107/63 (05-18-20 @ 10:00) (88/75 - 138/99)  RR: 12 (05-18-20 @ 10:00) (11 - 26)  SpO2: 98% (05-18-20 @ 10:00) (94% - 98%)  Wt(kg): --  I&O's Summary    17 May 2020 07:01  -  18 May 2020 07:00  --------------------------------------------------------  IN: 1925 mL / OUT: 1550 mL / NET: 375 mL            JVP: Normal  Neck: supple  Lung: clear   CV: S1 S2 , Murmur:  Abd: soft  Ext: No edema  Psych: flat affect  Skin: normal``    LABS/DATA:    CARDIAC MARKERS:                                12.9   6.70  )-----------( 190      ( 18 May 2020 06:05 )             40.2     05-18    140  |  104  |  31<H>  ----------------------------<  128<H>  4.3   |  24  |  0.64    Ca    8.6      18 May 2020 06:05  Phos  4.6     05-18  Mg     2.2     05-18      proBNP:   Lipid Profile:   HgA1c:   TSH:     TELE:  EKG:

## 2020-05-18 NOTE — PROGRESS NOTE ADULT - ASSESSMENT
ASSESSMENT: 87F with PMHx of HTN, HLD, presented with large left frontal/basal ganglia IPH, etiology unknown, possiblility of underlying hyptension. S/P larry clot removal. Now with FAF/A flutter.    NEURO: neurologically without acute change, continue close monitoring for neurologic deterioration as patient with cerebral edema, mass effect, and brain compression, she remains bedbound with immobility due to frailty and functional quadriplegia, maintain normotension, home statin therapy if applicable, MR brain upon resolution of hemorrhage for further evaluation, PMR: GINA.    ANTITHROMBOTIC THERAPY: none at this time in setting of ICH and increased risk for bleeding, pending outpatient follow up- clinical course and radiological stability will need to determine timeline for initiation of AC vs. Watchman in setting of atrial fibrillation.     PULMONARY: CXR enlarged heart, lungs clear, protecting airway, saturating well     CARDIOVASCULAR:  TTE: ef 65-70%, moderate AS, mild AR, LV remodeling, moderate stage II diastolic dysfunciton, borderline pulmonary htn , cardiac monitoring to ensure rate control, regimen as recommended by Dr. Zimmer.  Off beta blocker, ocntinue rate control as recommended                            SBP goal: <140mMHg    GASTROINTESTINAL:  dysphagia screen failed, NGT in place, goals of care pending re: long term enteral access.     Diet: NGT	    RENAL: BUN/Cr without acute change- remains slightly elevated, maintain adequate hydration, good urine output      Na Goal: Greater than 135     Garcia: n    HEMATOLOGY: H/H without acute change, no active bleeding, Platelets 190     DVT ppx: Heparin s.c [] LMWH [x]     ID: afebrile, no leukocytosis, no si/sx of infection    Other: Pending family discussion and goals of care consider palliative consult in setting of guarded prognosis.       DISPOSITION: [facility]       CORE MEASURES:        Admission NIHSS:      TPA: [] YES [x] NO      LDL/HDL:100/50     Depression Screen: NA     Statin Therapy: as noted     Dysphagia Screen: [] PASS [x] FAIL     Smoking [] YES [x] NO      Afib [x] YES [] NO     Stroke Education [x] YES [] NO    Obtain screening lower extremity venous ultrasound in patients who meet 1 or more of the following criteria as patient is high risk for DVT/PE on admission:   [] History of DVT/PE  []Hypercoagulable states (Factor V Leiden, Cancer, OCP, etc. )  []Prolonged immobility (hemiplegia/hemiparesis/post operative or any other extended immobilization)  [] Transferred from outside facility (Rehab or Long term care)  [] Age </= to 50      1700 addendum patient s/p lopressor and cardizem with SBP 93/59 and prior low 100s, no acute neurological changes, due to recent hypotension in setting of both regimens will d/c lopressor and continue to monitor, d/w dr Jayna zimmer - should hypotension persist will initiate midodrine 5mg tid ASSESSMENT: 87F with PMHx of HTN, HLD, presented with large left frontal/basal ganglia IPH, etiology unknown, possiblility of underlying hyptension. S/P larry clot removal. Now with PAF/A flutter.    NEURO: neurologically without acute change, continue close monitoring for neurologic deterioration as patient with cerebral edema, mass effect, and brain compression, she remains bedbound with immobility due to frailty and functional quadriplegia, maintain normotension, home statin therapy if applicable, MR brain upon resolution of hemorrhage for further evaluation, PMR: GINA.    ANTITHROMBOTIC THERAPY: none at this time in setting of ICH and increased risk for bleeding, pending outpatient follow up- clinical course and radiological stability will need to determine timeline for initiation of AC vs. Watchman in setting of atrial fibrillation.     PULMONARY: CXR enlarged heart, lungs clear, protecting airway, saturating well     CARDIOVASCULAR:  TTE: ef 65-70%, moderate AS, mild AR, LV remodeling, moderate stage II diastolic dysfunciton, borderline pulmonary htn , cardiac monitoring to ensure rate control, regimen as recommended by Dr. Zimmer.  Off beta blocker, ocntinue rate control as recommended                            SBP goal: <140mMHg    GASTROINTESTINAL:  dysphagia screen failed, NGT in place, goals of care pending re: long term enteral access.     Diet: NGT	    RENAL: BUN/Cr without acute change- remains slightly elevated, maintain adequate hydration, good urine output      Na Goal: Greater than 135     Garcia: n    HEMATOLOGY: H/H without acute change, no active bleeding, Platelets 190     DVT ppx: Heparin s.c [] LMWH [x]     ID: afebrile, no leukocytosis, no si/sx of infection    Other: Pending family discussion and goals of care consider palliative consult in setting of guarded prognosis.       DISPOSITION: [facility]       CORE MEASURES:        Admission NIHSS:      TPA: [] YES [x] NO      LDL/HDL:100/50     Depression Screen: NA     Statin Therapy: as noted     Dysphagia Screen: [] PASS [x] FAIL     Smoking [] YES [x] NO      Afib [x] YES [] NO     Stroke Education [x] YES [] NO    Obtain screening lower extremity venous ultrasound in patients who meet 1 or more of the following criteria as patient is high risk for DVT/PE on admission:   [] History of DVT/PE  []Hypercoagulable states (Factor V Leiden, Cancer, OCP, etc. )  []Prolonged immobility (hemiplegia/hemiparesis/post operative or any other extended immobilization)  [] Transferred from outside facility (Rehab or Long term care)  [] Age </= to 50      1700 addendum patient s/p lopressor and cardizem with SBP 93/59 and prior low 100s, no acute neurological changes, due to recent hypotension in setting of both regimens will d/c lopressor and continue to monitor, d/w dr Jayna zimmer - should hypotension persist will initiate midodrine 5mg tid ASSESSMENT: 87F with PMHx of HTN, HLD, presented with large left frontal/basal ganglia IPH, etiology unknown, possiblility of underlying hyptension. S/P larry clot removal. Now with PAF/A flutter.    NEURO: neurologically without acute change, continue close monitoring for neurologic deterioration as patient with cerebral edema, mass effect, and brain compression, she remains bedbound with immobility due to frailty and functional quadriplegia, maintain normotension, home statin therapy if applicable, MR brain upon resolution of hemorrhage for further evaluation, PMR: GINA.    ANTITHROMBOTIC THERAPY: none at this time in setting of ICH and increased risk for bleeding, pending outpatient follow up- 4 to 6 weeks, repeat CT Head before starting antithrombotic Rx,  clinical course and radiological stability will need to determine timeline for initiation of AC vs. Watchman in setting of atrial fibrillation.     PULMONARY: CXR enlarged heart, lungs clear, protecting airway, saturating well     CARDIOVASCULAR:  TTE: ef 65-70%, moderate AS, mild AR, LV remodeling, moderate stage II diastolic dysfunciton, borderline pulmonary htn , cardiac monitoring to ensure rate control, regimen as recommended by Dr. Zimmer.  Off beta blocker, ocntinue rate control as recommended                            SBP goal: <140mMHg    GASTROINTESTINAL:  dysphagia screen failed, NGT in place, goals of care pending re: long term enteral access.     Diet: NGT	    RENAL: BUN/Cr without acute change- remains slightly elevated, maintain adequate hydration, good urine output      Na Goal: Greater than 135     Garcia: n    HEMATOLOGY: H/H without acute change, no active bleeding, Platelets 190     DVT ppx: Heparin s.c [] LMWH [x]     ID: afebrile, no leukocytosis, no si/sx of infection    Other: Pending family discussion and goals of care consider palliative consult in setting of guarded prognosis.       DISPOSITION: [facility]       CORE MEASURES:        Admission NIHSS:      TPA: [] YES [x] NO      LDL/HDL:100/50     Depression Screen: NA     Statin Therapy: as noted     Dysphagia Screen: [] PASS [x] FAIL     Smoking [] YES [x] NO      Afib [x] YES [] NO     Stroke Education [x] YES [] NO    Obtain screening lower extremity venous ultrasound in patients who meet 1 or more of the following criteria as patient is high risk for DVT/PE on admission:   [] History of DVT/PE  []Hypercoagulable states (Factor V Leiden, Cancer, OCP, etc. )  []Prolonged immobility (hemiplegia/hemiparesis/post operative or any other extended immobilization)  [] Transferred from outside facility (Rehab or Long term care)  [] Age </= to 50      1700 addendum patient s/p lopressor and cardizem with SBP 93/59 and prior low 100s, no acute neurological changes, due to recent hypotension in setting of both regimens will d/c lopressor and continue to monitor, d/w dr Jayna zimmer - should hypotension persist will initiate midodrine 5mg tid

## 2020-05-18 NOTE — PROGRESS NOTE ADULT - ASSESSMENT
PAF  Aflutter  HR stable   cont cardizem, Dig  off a/c due to bleed     Hypotension   stable   off BB     Mod AS  monitor clinically for fluid overload   HR control     IPH   fu with nsx

## 2020-05-18 NOTE — PROGRESS NOTE ADULT - SUBJECTIVE AND OBJECTIVE BOX
THE PATIENT WAS SEEN AND EXAMINED BY ME WITH THE HOUSESTAFF AND STROKE TEAM DURING MORNING ROUNDS.   HPI:  87F w/ PMH of HTN, HLD, transferred from [facility] for NSG eval after found to have large L. BG IPH. Patient found to have severe headache with subsequent syncopal episode. Found to be minimally responsive and so initially intubated for airway protection. Was hypertensive,  started on Caredene and prop gtt. Cardene discontinue prior to transfer. Upon arrival, with sedation held she was unable to open her eyes, not following commands, moving spontaneously on the L, RUE loc, RLE WDs. After extensive discussion with the family regarding goals of care, she was brought to the operating room for emergent clot evacuation now extubated.       SUBJECTIVE: Hypotension noted overnight.     acetaminophen   Tablet .. 650 milliGRAM(s) Oral every 6 hours PRN  digoxin    Elixir 125 MICROGram(s) Oral daily  diltiazem    Tablet 90 milliGRAM(s) Oral every 6 hours  doxazosin 2 milliGRAM(s) Oral at bedtime  enoxaparin Injectable 40 milliGRAM(s) SubCutaneous <User Schedule>  levETIRAcetam  IVPB 500 milliGRAM(s) IV Intermittent every 12 hours  ondansetron Injectable 4 milliGRAM(s) IV Push every 6 hours PRN  polyethylene glycol 3350 17 Gram(s) Oral every 12 hours  senna 2 Tablet(s) Oral at bedtime  sodium chloride 3%  Inhalation 3 milliLiter(s) Inhalation every 6 hours PRN      PHYSICAL EXAM:   Vital Signs Last 24 Hrs  T(C): 37.1 (18 May 2020 07:07), Max: 37.1 (18 May 2020 07:07)  T(F): 98.7 (18 May 2020 07:07), Max: 98.7 (18 May 2020 07:07)  HR: 73 (18 May 2020 10:00) (73 - 135)  BP: 107/63 (18 May 2020 10:00) (88/75 - 138/99)  BP(mean): 76 (18 May 2020 10:00) (70 - 111)  RR: 12 (18 May 2020 10:00) (11 - 26)  SpO2: 98% (18 May 2020 10:00) (94% - 98%)    General: No acute distress  HEENT: eyes open, right gaze palsy , R HHA  Abdomen: Soft, nontender, nondistended   Extremities: No edema    NEUROLOGICAL EXAM:  Mental status: Awake, eyes open and somewhat minimally attentive then closes again, not following commands, no verbal output   Cranial Nerves: right facial droop, right gaze palsy, Rhha   Motor exam: left arm moves spontaneously against gravity within bed, LLE with some flexion in bed, right hemiplegia with only trace flexion  Sensation: decreased on right   Coordination/ Gait: gait not assessed     LABS:                        12.9   6.70  )-----------( 190      ( 18 May 2020 06:05 )             40.2    05-18    140  |  104  |  31<H>  ----------------------------<  128<H>  4.3   |  24  |  0.64    Ca    8.6      18 May 2020 06:05  Phos  4.6     05-18  Mg     2.2     05-18          IMAGING: Reviewed by me.   CT Head No Cont (05.11.20)  No change in moderate residual parenchymal hemorrhage in the left frontal parenchyma and basal ganglia compared with 5/10/2020. No change in mass effect.    (05.09.20)  NONCONTRAST CT BRAIN: Large intraparenchymal hemorrhage involving the left basal ganglia, frontal lobe and medial temporal lobe as described above. Near complete effacement of the left lateral ventricle. There is 0.4 cm of rightward midline shift.    CTA BRAIN AND NECK: Patent cervical circulation.  No identified aneurysm or AVM. Decreased size of the left middle cerebral artery may be due to distal branch occlusion or compression by the intraparenchymal hemorrhage. THE PATIENT WAS SEEN AND EXAMINED BY ME WITH THE HOUSESTAFF AND STROKE TEAM DURING MORNING ROUNDS.   HPI:  87F w/ PMH of HTN, HLD, transferred from [facility] for NSG eval after found to have large L. BG IPH. Patient found to have severe headache with subsequent syncopal episode. Found to be minimally responsive and so initially intubated for airway protection. Was hypertensive,  started on Caredene and prop gtt. Cardene discontinue prior to transfer. Upon arrival, with sedation held she was unable to open her eyes, not following commands, moving spontaneously on the L, RUE loc, RLE WDs. After extensive discussion with the family regarding goals of care, she was brought to the operating room for emergent clot evacuation now extubated.       SUBJECTIVE: Hypotension noted overnight.     acetaminophen   Tablet .. 650 milliGRAM(s) Oral every 6 hours PRN  digoxin    Elixir 125 MICROGram(s) Oral daily  diltiazem    Tablet 90 milliGRAM(s) Oral every 6 hours  doxazosin 2 milliGRAM(s) Oral at bedtime  enoxaparin Injectable 40 milliGRAM(s) SubCutaneous <User Schedule>  levETIRAcetam  IVPB 500 milliGRAM(s) IV Intermittent every 12 hours  ondansetron Injectable 4 milliGRAM(s) IV Push every 6 hours PRN  polyethylene glycol 3350 17 Gram(s) Oral every 12 hours  senna 2 Tablet(s) Oral at bedtime  sodium chloride 3%  Inhalation 3 milliLiter(s) Inhalation every 6 hours PRN      PHYSICAL EXAM:   Vital Signs Last 24 Hrs  T(C): 37.1 (18 May 2020 07:07), Max: 37.1 (18 May 2020 07:07)  T(F): 98.7 (18 May 2020 07:07), Max: 98.7 (18 May 2020 07:07)  HR: 73 (18 May 2020 10:00) (73 - 135)  BP: 107/63 (18 May 2020 10:00) (88/75 - 138/99)  BP(mean): 76 (18 May 2020 10:00) (70 - 111)  RR: 12 (18 May 2020 10:00) (11 - 26)  SpO2: 98% (18 May 2020 10:00) (94% - 98%)    General: No acute distress  HEENT: eyes open, right gaze palsy , R HHA  Abdomen: Soft, nontender, nondistended   Extremities: No edema    NEUROLOGICAL EXAM:  Mental status: Awake, eyes open and somewhat minimally attentive then closes again, not following commands, no verbal output   Cranial Nerves: right facial droop, right gaze palsy, right homonymous   Motor exam: left arm moves spontaneously against gravity within bed, LLE with some flexion in bed, right hemiplegia with only trace flexion  Sensation: decreased on right   Coordination/ Gait: gait not assessed     LABS:                        12.9   6.70  )-----------( 190      ( 18 May 2020 06:05 )             40.2    05-18    140  |  104  |  31<H>  ----------------------------<  128<H>  4.3   |  24  |  0.64    Ca    8.6      18 May 2020 06:05  Phos  4.6     05-18  Mg     2.2     05-18          IMAGING: Reviewed by me.   CT Head No Cont (05.11.20)  No change in moderate residual parenchymal hemorrhage in the left frontal parenchyma and basal ganglia compared with 5/10/2020. No change in mass effect.    (05.09.20)  NONCONTRAST CT BRAIN: Large intraparenchymal hemorrhage involving the left basal ganglia, frontal lobe and medial temporal lobe as described above. Near complete effacement of the left lateral ventricle. There is 0.4 cm of rightward midline shift.    CTA BRAIN AND NECK: Patent cervical circulation.  No identified aneurysm or AVM. Decreased size of the left middle cerebral artery may be due to distal branch occlusion or compression by the intraparenchymal hemorrhage.

## 2020-05-19 LAB
ANION GAP SERPL CALC-SCNC: 12 MMOL/L — SIGNIFICANT CHANGE UP (ref 5–17)
BUN SERPL-MCNC: 36 MG/DL — HIGH (ref 7–23)
CALCIUM SERPL-MCNC: 8.4 MG/DL — SIGNIFICANT CHANGE UP (ref 8.4–10.5)
CHLORIDE SERPL-SCNC: 102 MMOL/L — SIGNIFICANT CHANGE UP (ref 96–108)
CO2 SERPL-SCNC: 24 MMOL/L — SIGNIFICANT CHANGE UP (ref 22–31)
CREAT SERPL-MCNC: 0.71 MG/DL — SIGNIFICANT CHANGE UP (ref 0.5–1.3)
GLUCOSE SERPL-MCNC: 98 MG/DL — SIGNIFICANT CHANGE UP (ref 70–99)
HCT VFR BLD CALC: 39.6 % — SIGNIFICANT CHANGE UP (ref 34.5–45)
HGB BLD-MCNC: 13.7 G/DL — SIGNIFICANT CHANGE UP (ref 11.5–15.5)
MCHC RBC-ENTMCNC: 33.4 PG — SIGNIFICANT CHANGE UP (ref 27–34)
MCHC RBC-ENTMCNC: 34.6 GM/DL — SIGNIFICANT CHANGE UP (ref 32–36)
MCV RBC AUTO: 96.6 FL — SIGNIFICANT CHANGE UP (ref 80–100)
NRBC # BLD: 0 /100 WBCS — SIGNIFICANT CHANGE UP (ref 0–0)
PLATELET # BLD AUTO: 162 K/UL — SIGNIFICANT CHANGE UP (ref 150–400)
POTASSIUM SERPL-MCNC: 4.1 MMOL/L — SIGNIFICANT CHANGE UP (ref 3.5–5.3)
POTASSIUM SERPL-SCNC: 4.1 MMOL/L — SIGNIFICANT CHANGE UP (ref 3.5–5.3)
RBC # BLD: 4.1 M/UL — SIGNIFICANT CHANGE UP (ref 3.8–5.2)
RBC # FLD: 13.2 % — SIGNIFICANT CHANGE UP (ref 10.3–14.5)
SODIUM SERPL-SCNC: 138 MMOL/L — SIGNIFICANT CHANGE UP (ref 135–145)
WBC # BLD: 8.14 K/UL — SIGNIFICANT CHANGE UP (ref 3.8–10.5)
WBC # FLD AUTO: 8.14 K/UL — SIGNIFICANT CHANGE UP (ref 3.8–10.5)

## 2020-05-19 PROCEDURE — 99233 SBSQ HOSP IP/OBS HIGH 50: CPT

## 2020-05-19 PROCEDURE — 99232 SBSQ HOSP IP/OBS MODERATE 35: CPT

## 2020-05-19 RX ORDER — LEVETIRACETAM 250 MG/1
500 TABLET, FILM COATED ORAL
Refills: 0 | Status: DISCONTINUED | OUTPATIENT
Start: 2020-05-19 | End: 2020-05-21

## 2020-05-19 RX ADMIN — SENNA PLUS 2 TABLET(S): 8.6 TABLET ORAL at 22:32

## 2020-05-19 RX ADMIN — LEVETIRACETAM 400 MILLIGRAM(S): 250 TABLET, FILM COATED ORAL at 05:14

## 2020-05-19 RX ADMIN — POLYETHYLENE GLYCOL 3350 17 GRAM(S): 17 POWDER, FOR SOLUTION ORAL at 17:53

## 2020-05-19 RX ADMIN — Medication 90 MILLIGRAM(S): at 17:09

## 2020-05-19 RX ADMIN — Medication 90 MILLIGRAM(S): at 00:16

## 2020-05-19 RX ADMIN — Medication 90 MILLIGRAM(S): at 23:26

## 2020-05-19 RX ADMIN — Medication 90 MILLIGRAM(S): at 05:14

## 2020-05-19 RX ADMIN — Medication 125 MICROGRAM(S): at 05:15

## 2020-05-19 RX ADMIN — POLYETHYLENE GLYCOL 3350 17 GRAM(S): 17 POWDER, FOR SOLUTION ORAL at 05:15

## 2020-05-19 RX ADMIN — ENOXAPARIN SODIUM 40 MILLIGRAM(S): 100 INJECTION SUBCUTANEOUS at 17:10

## 2020-05-19 RX ADMIN — Medication 90 MILLIGRAM(S): at 12:17

## 2020-05-19 RX ADMIN — Medication 2 MILLIGRAM(S): at 22:33

## 2020-05-19 RX ADMIN — LEVETIRACETAM 500 MILLIGRAM(S): 250 TABLET, FILM COATED ORAL at 17:09

## 2020-05-19 NOTE — PROGRESS NOTE ADULT - SUBJECTIVE AND OBJECTIVE BOX
Subjective: Patient seen and examined. No new events except as noted.     SUBJECTIVE/ROS:        MEDICATIONS:  MEDICATIONS  (STANDING):  digoxin    Elixir 125 MICROGram(s) Oral daily  diltiazem    Tablet 90 milliGRAM(s) Oral every 6 hours  doxazosin 2 milliGRAM(s) Oral at bedtime  enoxaparin Injectable 40 milliGRAM(s) SubCutaneous <User Schedule>  levETIRAcetam  IVPB 500 milliGRAM(s) IV Intermittent every 12 hours  polyethylene glycol 3350 17 Gram(s) Oral every 12 hours  senna 2 Tablet(s) Oral at bedtime      PHYSICAL EXAM:  T(C): 36.6 (05-19-20 @ 07:14), Max: 36.9 (05-18-20 @ 15:19)  HR: 74 (05-19-20 @ 10:00) (73 - 88)  BP: 110/60 (05-19-20 @ 10:00) (99/66 - 125/60)  RR: 12 (05-19-20 @ 10:00) (12 - 18)  SpO2: 100% (05-19-20 @ 10:00) (95% - 100%)  Wt(kg): --  I&O's Summary    18 May 2020 07:01  -  19 May 2020 07:00  --------------------------------------------------------  IN: 1770 mL / OUT: 1800 mL / NET: -30 mL            JVP: Normal  Neck: supple  Lung: clear   CV: S1 S2 , Murmur:  Abd: soft  Ext: No edema  neuro: Awake   Psych: flat affect  Skin: normal``    LABS/DATA:    CARDIAC MARKERS:                                13.7   8.14  )-----------( 162      ( 19 May 2020 06:04 )             39.6     05-19    138  |  102  |  36<H>  ----------------------------<  98  4.1   |  24  |  0.71    Ca    8.4      19 May 2020 06:04  Phos  4.6     05-18  Mg     2.2     05-18      proBNP:   Lipid Profile:   HgA1c:   TSH:     TELE:  EKG:

## 2020-05-19 NOTE — PROGRESS NOTE ADULT - SUBJECTIVE AND OBJECTIVE BOX
HPI:  Patient seen and examined.  Max A with transfers.    MEDICATIONS  (STANDING):  digoxin    Elixir 125 MICROGram(s) Oral daily  diltiazem    Tablet 90 milliGRAM(s) Oral every 6 hours  doxazosin 2 milliGRAM(s) Oral at bedtime  enoxaparin Injectable 40 milliGRAM(s) SubCutaneous <User Schedule>  levETIRAcetam  IVPB 500 milliGRAM(s) IV Intermittent every 12 hours  polyethylene glycol 3350 17 Gram(s) Oral every 12 hours  senna 2 Tablet(s) Oral at bedtime    MEDICATIONS  (PRN):  acetaminophen   Tablet .. 650 milliGRAM(s) Oral every 6 hours PRN Temp greater or equal to 38C (100.4F), Mild Pain (1 - 3)  ondansetron Injectable 4 milliGRAM(s) IV Push every 6 hours PRN Nausea and/or Vomiting  sodium chloride 3%  Inhalation 3 milliLiter(s) Inhalation every 6 hours PRN secretions    Vital Signs Last 24 Hrs  T(C): 36.6 (19 May 2020 07:14), Max: 36.9 (18 May 2020 15:19)  T(F): 97.9 (19 May 2020 07:14), Max: 98.4 (18 May 2020 15:19)  HR: 74 (19 May 2020 10:00) (73 - 88)  BP: 110/60 (19 May 2020 10:00) (99/66 - 125/60)  BP(mean): 76 (19 May 2020 10:00) (73 - 80)  RR: 12 (19 May 2020 10:00) (12 - 18)  SpO2: 100% (19 May 2020 10:00) (95% - 100%)    PHYSICAL EXAM  Constitutional - NAD, Comfortable   HEENT - Incision intact, +NGT   Neck - Supple  Chest - CTA bilaterally  Cardiovascular - RRR, S1S2  Abdomen - BS+, Soft, NTND  Extremities - No C/C/E, No calf tenderness   Neurologic Exam -                  Aphasia   + alert   Follows some verbal instruction   RUE/RLE 0/5, LUE/LLE 4+/5                           13.7   8.14  )-----------( 162      ( 19 May 2020 06:04 )             39.6     05-19    138  |  102  |  36<H>  ----------------------------<  98  4.1   |  24  |  0.71    Ca    8.4      19 May 2020 06:04  Phos  4.6     05-18  Mg     2.2     05-18    ASSESSMENT/PLAN  87 year-old woman with history of hypertension and hyperlipidemia presented with large left BG hemorrhage s/p left larry evacuation Now with functional, gait, ADL, impairments.    PT - ROM, Bed Mob, Transfers, Amb with AD   OT - ADLs, ROM  SLP - Dysphagia eval and treat  Precautions - Falls, Cardiac  DVT Prophylaxis - Lovenox   Skin - Turn Q2hrs  Disposition recommendation-  [facility]  Had discussion with patient's son Shahid regarding rehabilitation options and rationale for [facility].

## 2020-05-19 NOTE — PROGRESS NOTE ADULT - SUBJECTIVE AND OBJECTIVE BOX
THE PATIENT WAS SEEN AND EXAMINED BY ME WITH THE HOUSESTAFF AND STROKE TEAM DURING MORNING ROUNDS.   HPI:  87F w/ PMH of HTN, HLD, transferred from [facility] for NSG eval after found to have large L. BG IPH. Patient found to have severe headache with subsequent syncopal episode. Found to be minimally responsive and so initially intubated for airway protection. Was hypertensive,  started on Caredene and prop gtt. Cardene discontinue prior to transfer. Upon arrival, with sedation held she was unable to open her eyes, not following commands, moving spontaneously on the L, RUE loc, RLE WDs. After extensive discussion with the family regarding goals of care, she was brought to the operating room for emergent clot evacuation now extubated.     SUBJECTIVE: No events overnight.  No new neurologic complaints.      acetaminophen   Tablet .. 650 milliGRAM(s) Oral every 6 hours PRN  digoxin    Elixir 125 MICROGram(s) Oral daily  diltiazem    Tablet 90 milliGRAM(s) Oral every 6 hours  doxazosin 2 milliGRAM(s) Oral at bedtime  enoxaparin Injectable 40 milliGRAM(s) SubCutaneous <User Schedule>  levETIRAcetam  IVPB 500 milliGRAM(s) IV Intermittent every 12 hours  ondansetron Injectable 4 milliGRAM(s) IV Push every 6 hours PRN  polyethylene glycol 3350 17 Gram(s) Oral every 12 hours  senna 2 Tablet(s) Oral at bedtime  sodium chloride 3%  Inhalation 3 milliLiter(s) Inhalation every 6 hours PRN    PHYSICAL EXAM:   Vital Signs Last 24 Hrs  T(C): 36.8 (18 May 2020 20:00), Max: 36.9 (18 May 2020 15:19)  T(F): 98.2 (18 May 2020 20:00), Max: 98.4 (18 May 2020 15:19)  HR: 74 (19 May 2020 06:00) (73 - 88)  BP: 104/51 (19 May 2020 06:00) (99/66 - 125/60)  BP(mean): 76 (19 May 2020 05:15) (73 - 80)  RR: 17 (19 May 2020 06:00) (11 - 18)  SpO2: 96% (19 May 2020 06:00) (95% - 99%)    General: No acute distress  HEENT: eyes open, right gaze palsy , R HHA  Abdomen: Soft, nontender, nondistended   Extremities: No edema    NEUROLOGICAL EXAM:  Mental status: Awake, eyes open and somewhat minimally attentive then closes again, not following commands, no verbal output   Cranial Nerves: right facial droop, right gaze palsy, right homonymous   Motor exam: left arm moves spontaneously against gravity within bed, LLE with some flexion in bed, right hemiplegia with only trace flexion  Sensation: decreased on right   Coordination/ Gait: gait not assessed    LABS:                        13.7   8.14  )-----------( 162      ( 19 May 2020 06:04 )             39.6    05-19    138  |  102  |  36<H>  ----------------------------<  98  4.1   |  24  |  0.71    Ca    8.4      19 May 2020 06:04  Phos  4.6     05-18  Mg     2.2     05-18      IMAGING: Reviewed by me.     CT Head No Cont (05.11.20)  No change in moderate residual parenchymal hemorrhage in the left frontal parenchyma and basal ganglia compared with 5/10/2020. No change in mass effect.    (05.09.20)  NONCONTRAST CT BRAIN: Large intraparenchymal hemorrhage involving the left basal ganglia, frontal lobe and medial temporal lobe as described above. Near complete effacement of the left lateral ventricle. There is 0.4 cm of rightward midline shift.    CTA BRAIN AND NECK: Patent cervical circulation.  No identified aneurysm or AVM. Decreased size of the left middle cerebral artery may be due to distal branch occlusion or compression by the intraparenchymal hemorrhage.

## 2020-05-19 NOTE — PROGRESS NOTE ADULT - ASSESSMENT
ASSESSMENT: 87F with PMHx of HTN, HLD, presented with large left frontal/basal ganglia IPH, etiology unknown, possibility of underlying hypertension S/P larry clot removal. Now with PAF/A flutter.    NEURO: neurologically without acute change, continue close monitoring for neurologic deterioration as patient with cerebral edema, mass effect, and brain compression, she remains bedbound with immobility due to frailty and functional quadriplegia, maintain normotension, home statin therapy if applicable, MR brain upon resolution of hemorrhage for further evaluation, PMR: [facility].    ANTITHROMBOTIC THERAPY: none at this time in setting of ICH and increased risk for bleeding, pending outpatient follow up- 4 to 6 weeks, repeat CT Head before starting antithrombotic Rx, clinical course and radiological stability will need to determine timeline for initiation of AC vs. Watchman in setting of atrial fibrillation.     PULMONARY: CXR on 5/11 shows enlarged heart, lungs clear, protecting airway, saturating well     CARDIOVASCULAR:  TTE: ef 65-70%, moderate AS, mild AR, LV remodeling, moderate stage II diastolic dysfunction borderline pulmonary htn , cardiac monitoring to ensure rate control, regimen as recommended by Dr. Nix.  Off beta blocker, continue rate control as recommended.                           SBP goal: <140mMHg    GASTROINTESTINAL:  dysphagia screen failed, NGT in place, SLP to re eval on 5/20.  Goals of care pending re: long term enteral access.     Diet: NGT	    RENAL: BUN/Cr without acute change- remains slightly elevated, maintain adequate hydration, good urine output      Na Goal: Greater than 135     Garcia: n    HEMATOLOGY: H/H without acute change, no active bleeding, Platelets 162     DVT ppx: Heparin s.c [] LMWH [x]     ID: afebrile, no leukocytosis, no si/sx of infection    Other: Pending family discussion and goals of care consider palliative consult in setting of guarded prognosis.     DISPOSITION: [facility]     CORE MEASURES:        Admission NIHSS:      TPA: [] YES [x] NO      LDL/HDL:100/50     Depression Screen: NA     Statin Therapy: as noted     Dysphagia Screen: [] PASS [x] FAIL     Smoking [] YES [x] NO      Afib [x] YES [] NO     Stroke Education [x] YES [] NO    Obtain screening lower extremity venous ultrasound in patients who meet 1 or more of the following criteria as patient is high risk for DVT/PE on admission:   [] History of DVT/PE  []Hypercoagulable states (Factor V Leiden, Cancer, OCP, etc. )  []Prolonged immobility (hemiplegia/hemiparesis/post operative or any other extended immobilization)  [] Transferred from outside facility (Rehab or Long term care)  [] Age </= to 50

## 2020-05-19 NOTE — CHART NOTE - NSCHARTNOTEFT_GEN_A_CORE
Nutrition Follow Up Note    Patient seen for: stroke unit f/u    Chart reviewed, events noted. Pt seen by ST 5/18 w/ recommendation to continue NPO, plan to f/u on 5/20.    Source: chart, team, pt    Diet :  Diet, NPO:   Tube Feeding Modality: Nasogastric  Jevity 1.2 Tree (JEVITY1.2RTH)  Total Volume for 24 Hours (mL): 990  Intermittent  Starting Tube Feed Rate {mL per Hour}: 20  Increase Tube Feed Rate by (mL): 10    Every 4 hours  Until Goal Tube Feed Rate (mL per Hour): 55  Tube Feeding Hours ON: 18  Tube Feeding OFF (Hours): 6  Tube Feed Start Time: 15:00      Current EN provides: 1188 kcals, 55 gm protein, 799cc free water     Enteral /Parenteral Nutrition: avg EN intake over past 3 days 1154 kcals, 53 gm protein. Current EN order not meeting estimated nutrition needs    GI: no N/V, patient denies any abd discomfort w/ tube feeds. Last BM 5/18 (on bowel regimen)    Dosing wt: 82.2 kg, no recent wt    Pertinent Medications: MEDICATIONS  (STANDING):  digoxin    Elixir 125 MICROGram(s) Oral daily  diltiazem    Tablet 90 milliGRAM(s) Oral every 6 hours  doxazosin 2 milliGRAM(s) Oral at bedtime  enoxaparin Injectable 40 milliGRAM(s) SubCutaneous <User Schedule>  levETIRAcetam  IVPB 500 milliGRAM(s) IV Intermittent every 12 hours  polyethylene glycol 3350 17 Gram(s) Oral every 12 hours  senna 2 Tablet(s) Oral at bedtime    MEDICATIONS  (PRN):  acetaminophen   Tablet .. 650 milliGRAM(s) Oral every 6 hours PRN Temp greater or equal to 38C (100.4F), Mild Pain (1 - 3)  ondansetron Injectable 4 milliGRAM(s) IV Push every 6 hours PRN Nausea and/or Vomiting  sodium chloride 3%  Inhalation 3 milliLiter(s) Inhalation every 6 hours PRN secretions    05-19 @ 06:04: Na 138, BUN 36<H>, Cr 0.71, BG 98, K+ 4.1, Phos --, Mg --, Alk Phos --, ALT/SGPT --, AST/SGOT --, HbA1c --    Finger Sticks:      Skin per nursing documentation: no pressure injuries documented   Edema: 1+ generalized, right hand    Estimated Needs:   Energy: (20-25kcal/kg dosing wt 82.2kg): 1644-2055kcal  Protein: (1.4-1.6g protein/kg IBW 58.9kg): 82-94g protein     Previous Nutrition Diagnosis: increased needs  Nutrition Diagnosis is: ongoing    New Nutrition Diagnosis: none     Interventions:     Recommend  1) Increase Jevity 1.2 at 75 cc/hr x 18 hrs provides 1620 kcals, 75 gm protein, 1089cc free water  meets 20 Kcal/Kg dosing wt 82.2 kg,  1.3Gm/kg IBW 58.9 kg  2) free water 210cc q 6 hrs    Monitoring and Evaluation:     Continue to monitor Nutritional intake, Tolerance to diet prescription, weights, labs, skin integrity    RD remains available upon request and will follow up per protocol Nutrition Follow Up Note    Patient seen for: stroke unit f/u    Chart reviewed, events noted. Pt seen by ST 5/18 w/ recommendation to continue NPO, plan to f/u on 5/20.    Source: chart, team, pt    Diet :  Diet, NPO:   Tube Feeding Modality: Nasogastric  Jevity 1.2 Tree (JEVITY1.2RTH)  Total Volume for 24 Hours (mL): 990  Intermittent  Starting Tube Feed Rate {mL per Hour}: 20  Increase Tube Feed Rate by (mL): 10    Every 4 hours  Until Goal Tube Feed Rate (mL per Hour): 55  Tube Feeding Hours ON: 18  Tube Feeding OFF (Hours): 6  Tube Feed Start Time: 15:00      Current EN provides: 1188 kcals, 55 gm protein, 799cc free water     Enteral /Parenteral Nutrition: avg EN intake over past 3 days 1154 kcals, 53 gm protein. Current EN order not meeting estimated nutrition needs    GI: no N/V, patient denies any abd discomfort w/ tube feeds. Last BM 5/18 (on bowel regimen)    Dosing wt: 82.2 kg, no recent wt    Pertinent Medications: MEDICATIONS  (STANDING):  digoxin    Elixir 125 MICROGram(s) Oral daily  diltiazem    Tablet 90 milliGRAM(s) Oral every 6 hours  doxazosin 2 milliGRAM(s) Oral at bedtime  enoxaparin Injectable 40 milliGRAM(s) SubCutaneous <User Schedule>  levETIRAcetam  IVPB 500 milliGRAM(s) IV Intermittent every 12 hours  polyethylene glycol 3350 17 Gram(s) Oral every 12 hours  senna 2 Tablet(s) Oral at bedtime    MEDICATIONS  (PRN):  acetaminophen   Tablet .. 650 milliGRAM(s) Oral every 6 hours PRN Temp greater or equal to 38C (100.4F), Mild Pain (1 - 3)  ondansetron Injectable 4 milliGRAM(s) IV Push every 6 hours PRN Nausea and/or Vomiting  sodium chloride 3%  Inhalation 3 milliLiter(s) Inhalation every 6 hours PRN secretions    05-19 @ 06:04: Na 138, BUN 36<H>, Cr 0.71, BG 98, K+ 4.1, Phos --, Mg --, Alk Phos --, ALT/SGPT --, AST/SGOT --, HbA1c --    Finger Sticks:      Skin per nursing documentation: no pressure injuries documented   Edema: 1+ generalized, right hand    Estimated Needs:   Energy: (20-25kcal/kg dosing wt 82.2kg): 1644-2055kcal  Protein: (1.4-1.6g protein/kg IBW 58.9kg): 82-94g protein     Previous Nutrition Diagnosis: increased needs  Nutrition Diagnosis is: ongoing    New Nutrition Diagnosis: none     Interventions:     Recommend  1) Increase Jevity 1.2 at 75 cc/hr x 18 hrs provides 1620 kcals, 75 gm protein, 1089cc free water  meets 20 Kcal/Kg dosing wt 82.2 kg,  1.3Gm/kg IBW 58.9 kg  2) free water 210cc q 6 hr    Monitoring and Evaluation:     Continue to monitor Nutritional intake, Tolerance to diet prescription, weights, labs, skin integrity    RD remains available upon request and will follow up per protocol

## 2020-05-20 LAB
ALBUMIN SERPL ELPH-MCNC: 2.8 G/DL — LOW (ref 3.3–5)
ALP SERPL-CCNC: 56 U/L — SIGNIFICANT CHANGE UP (ref 40–120)
ALT FLD-CCNC: 66 U/L — HIGH (ref 10–45)
ANION GAP SERPL CALC-SCNC: 13 MMOL/L — SIGNIFICANT CHANGE UP (ref 5–17)
AST SERPL-CCNC: 35 U/L — SIGNIFICANT CHANGE UP (ref 10–40)
BILIRUB SERPL-MCNC: 0.6 MG/DL — SIGNIFICANT CHANGE UP (ref 0.2–1.2)
BUN SERPL-MCNC: 31 MG/DL — HIGH (ref 7–23)
CALCIUM SERPL-MCNC: 8.9 MG/DL — SIGNIFICANT CHANGE UP (ref 8.4–10.5)
CHLORIDE SERPL-SCNC: 102 MMOL/L — SIGNIFICANT CHANGE UP (ref 96–108)
CO2 SERPL-SCNC: 24 MMOL/L — SIGNIFICANT CHANGE UP (ref 22–31)
CREAT SERPL-MCNC: 0.79 MG/DL — SIGNIFICANT CHANGE UP (ref 0.5–1.3)
DIGOXIN SERPL-MCNC: 1 NG/ML — SIGNIFICANT CHANGE UP (ref 0.8–2)
GLUCOSE SERPL-MCNC: 97 MG/DL — SIGNIFICANT CHANGE UP (ref 70–99)
HCT VFR BLD CALC: 40.7 % — SIGNIFICANT CHANGE UP (ref 34.5–45)
HGB BLD-MCNC: 13.2 G/DL — SIGNIFICANT CHANGE UP (ref 11.5–15.5)
MCHC RBC-ENTMCNC: 31.4 PG — SIGNIFICANT CHANGE UP (ref 27–34)
MCHC RBC-ENTMCNC: 32.4 GM/DL — SIGNIFICANT CHANGE UP (ref 32–36)
MCV RBC AUTO: 96.9 FL — SIGNIFICANT CHANGE UP (ref 80–100)
NRBC # BLD: 0 /100 WBCS — SIGNIFICANT CHANGE UP (ref 0–0)
PHOSPHATE SERPL-MCNC: 4.5 MG/DL — SIGNIFICANT CHANGE UP (ref 2.5–4.5)
PLATELET # BLD AUTO: 164 K/UL — SIGNIFICANT CHANGE UP (ref 150–400)
POTASSIUM SERPL-MCNC: 4 MMOL/L — SIGNIFICANT CHANGE UP (ref 3.5–5.3)
POTASSIUM SERPL-SCNC: 4 MMOL/L — SIGNIFICANT CHANGE UP (ref 3.5–5.3)
PROT SERPL-MCNC: 5.2 G/DL — LOW (ref 6–8.3)
RBC # BLD: 4.2 M/UL — SIGNIFICANT CHANGE UP (ref 3.8–5.2)
RBC # FLD: 13.1 % — SIGNIFICANT CHANGE UP (ref 10.3–14.5)
SODIUM SERPL-SCNC: 139 MMOL/L — SIGNIFICANT CHANGE UP (ref 135–145)
WBC # BLD: 9.69 K/UL — SIGNIFICANT CHANGE UP (ref 3.8–10.5)
WBC # FLD AUTO: 9.69 K/UL — SIGNIFICANT CHANGE UP (ref 3.8–10.5)

## 2020-05-20 PROCEDURE — 99233 SBSQ HOSP IP/OBS HIGH 50: CPT

## 2020-05-20 PROCEDURE — 74230 X-RAY XM SWLNG FUNCJ C+: CPT | Mod: 26

## 2020-05-20 PROCEDURE — 99232 SBSQ HOSP IP/OBS MODERATE 35: CPT

## 2020-05-20 RX ORDER — NYSTATIN 500MM UNIT
4 POWDER (EA) MISCELLANEOUS
Refills: 0 | Status: COMPLETED | OUTPATIENT
Start: 2020-05-20 | End: 2020-05-27

## 2020-05-20 RX ORDER — SODIUM CHLORIDE 9 MG/ML
500 INJECTION INTRAMUSCULAR; INTRAVENOUS; SUBCUTANEOUS ONCE
Refills: 0 | Status: COMPLETED | OUTPATIENT
Start: 2020-05-20 | End: 2020-05-20

## 2020-05-20 RX ADMIN — SODIUM CHLORIDE 1000 MILLILITER(S): 9 INJECTION INTRAMUSCULAR; INTRAVENOUS; SUBCUTANEOUS at 17:27

## 2020-05-20 RX ADMIN — LEVETIRACETAM 500 MILLIGRAM(S): 250 TABLET, FILM COATED ORAL at 16:44

## 2020-05-20 RX ADMIN — Medication 4 MILLILITER(S): at 16:44

## 2020-05-20 RX ADMIN — Medication 90 MILLIGRAM(S): at 06:15

## 2020-05-20 RX ADMIN — SODIUM CHLORIDE 2000 MILLILITER(S): 9 INJECTION INTRAMUSCULAR; INTRAVENOUS; SUBCUTANEOUS at 15:57

## 2020-05-20 RX ADMIN — Medication 125 MICROGRAM(S): at 06:17

## 2020-05-20 RX ADMIN — Medication 90 MILLIGRAM(S): at 16:44

## 2020-05-20 RX ADMIN — Medication 90 MILLIGRAM(S): at 10:58

## 2020-05-20 RX ADMIN — POLYETHYLENE GLYCOL 3350 17 GRAM(S): 17 POWDER, FOR SOLUTION ORAL at 06:17

## 2020-05-20 RX ADMIN — Medication 90 MILLIGRAM(S): at 23:00

## 2020-05-20 RX ADMIN — LEVETIRACETAM 500 MILLIGRAM(S): 250 TABLET, FILM COATED ORAL at 06:16

## 2020-05-20 RX ADMIN — Medication 650 MILLIGRAM(S): at 16:56

## 2020-05-20 RX ADMIN — ENOXAPARIN SODIUM 40 MILLIGRAM(S): 100 INJECTION SUBCUTANEOUS at 16:44

## 2020-05-20 RX ADMIN — Medication 2 MILLIGRAM(S): at 23:00

## 2020-05-20 RX ADMIN — Medication 4 MILLILITER(S): at 23:00

## 2020-05-20 RX ADMIN — Medication 4 MILLILITER(S): at 10:58

## 2020-05-20 NOTE — SWALLOW VFSS/MBS ASSESSMENT ADULT - CONSISTENCIES ADMINISTERED
puree thin puree thick Pt given honey thick tspn as liquid wash due to severity of oral residue of thick puree/honey thick

## 2020-05-20 NOTE — SWALLOW VFSS/MBS ASSESSMENT ADULT - ORAL PHASE
Delayed oral transit time/Reduced anterior - posterior transport/Incomplete tongue to palate contact/Uncontrolled bolus / spillover in ritu-pharynx/Residue in oral cavity/Uncontrolled bolus / spillover in hypopharynx Residue in oral cavity/Uncontrolled bolus / spillover in ritu-pharynx/Reduced anterior - posterior transport/Incomplete tongue to palate contact/Delayed oral transit time

## 2020-05-20 NOTE — SWALLOW VFSS/MBS ASSESSMENT ADULT - ADDITIONAL RECOMMENDATIONS
Recommend restorative ST services at next level of care to target expressive and receptive language deficits and swallow function.

## 2020-05-20 NOTE — PROGRESS NOTE ADULT - SUBJECTIVE AND OBJECTIVE BOX
HPI:  Patient seen and examined.  Mod A with transfers.    MEDICATIONS  (STANDING):  digoxin    Elixir 125 MICROGram(s) Oral daily  diltiazem    Tablet 90 milliGRAM(s) Oral every 6 hours  doxazosin 2 milliGRAM(s) Oral at bedtime  enoxaparin Injectable 40 milliGRAM(s) SubCutaneous <User Schedule>  levETIRAcetam  Solution 500 milliGRAM(s) Oral two times a day  nystatin    Suspension 4 milliLiter(s) Oral four times a day  polyethylene glycol 3350 17 Gram(s) Oral every 12 hours  senna 2 Tablet(s) Oral at bedtime    MEDICATIONS  (PRN):  acetaminophen   Tablet .. 650 milliGRAM(s) Oral every 6 hours PRN Temp greater or equal to 38C (100.4F), Mild Pain (1 - 3)  ondansetron Injectable 4 milliGRAM(s) IV Push every 6 hours PRN Nausea and/or Vomiting  sodium chloride 3%  Inhalation 3 milliLiter(s) Inhalation every 6 hours PRN secretions    Vital Signs Last 24 Hrs  T(C): 36.9 (20 May 2020 07:13), Max: 36.9 (19 May 2020 19:36)  T(F): 98.4 (20 May 2020 07:13), Max: 98.5 (19 May 2020 19:36)  HR: 72 (20 May 2020 12:11) (71 - 108)  BP: 116/55 (20 May 2020 12:11) (101/52 - 125/56)  BP(mean): 65 (20 May 2020 08:00) (64 - 101)  RR: 18 (20 May 2020 12:11) (12 - 21)  SpO2: 97% (20 May 2020 12:11) (96% - 98%)    PHYSICAL EXAM  Constitutional - NAD, Comfortable   HEENT - Incision intact, +NGT   Neck - Supple  Chest - CTA bilaterally  Cardiovascular - RRR, S1S2  Abdomen - BS+, Soft, NTND  Extremities - No C/C/E, No calf tenderness   Neurologic Exam -                  Aphasia   + alert   Follows some verbal instruction inconsistently   RUE/RLE 0/5, LUE/LLE 4+/5                           13.2   9.69  )-----------( 164      ( 20 May 2020 05:32 )             40.7     05-20    139  |  102  |  31<H>  ----------------------------<  97  4.0   |  24  |  0.79    Ca    8.9      20 May 2020 05:32  Phos  4.5     05-20    TPro  5.2<L>  /  Alb  2.8<L>  /  TBili  0.6  /  DBili  x   /  AST  35  /  ALT  66<H>  /  AlkPhos  56  05-20                      ASSESSMENT/PLAN  87 year-old woman with history of hypertension and hyperlipidemia presented with large left BG hemorrhage s/p left larry evacuation Now with functional, gait, ADL, impairments.    PT - ROM, Bed Mob, Transfers, Amb with AD   OT - ADLs, ROM  SLP - Dysphagia eval and treat  Precautions - Falls, Cardiac  DVT Prophylaxis - Lovenox   Skin - Turn Q2hrs  Disposition recommendation-  [facility]

## 2020-05-20 NOTE — PROGRESS NOTE ADULT - SUBJECTIVE AND OBJECTIVE BOX
Subjective: Patient seen and examined. No new events except as noted.     SUBJECTIVE/ROS:        MEDICATIONS:  MEDICATIONS  (STANDING):  digoxin    Elixir 125 MICROGram(s) Oral daily  diltiazem    Tablet 90 milliGRAM(s) Oral every 6 hours  doxazosin 2 milliGRAM(s) Oral at bedtime  enoxaparin Injectable 40 milliGRAM(s) SubCutaneous <User Schedule>  levETIRAcetam  Solution 500 milliGRAM(s) Oral two times a day  polyethylene glycol 3350 17 Gram(s) Oral every 12 hours  senna 2 Tablet(s) Oral at bedtime      PHYSICAL EXAM:  T(C): 36.9 (05-20-20 @ 07:13), Max: 36.9 (05-19-20 @ 19:36)  HR: 86 (05-20-20 @ 06:00) (72 - 108)  BP: 121/53 (05-20-20 @ 06:00) (101/52 - 125/56)  RR: 14 (05-20-20 @ 06:00) (12 - 21)  SpO2: 97% (05-20-20 @ 06:00) (96% - 100%)  Wt(kg): --  I&O's Summary    19 May 2020 07:01  -  20 May 2020 07:00  --------------------------------------------------------  IN: 1865 mL / OUT: 1300 mL / NET: 565 mL            JVP: Normal  Neck: supple  Lung: clear   CV: S1 S2 , Murmur:  Abd: soft  Ext: No edema  neuro: aphasic   Psych: flat affect  Skin: normal``    LABS/DATA:    CARDIAC MARKERS:                                13.2   9.69  )-----------( 164      ( 20 May 2020 05:32 )             40.7     05-20    139  |  102  |  31<H>  ----------------------------<  97  4.0   |  24  |  0.79    Ca    8.9      20 May 2020 05:32  Phos  4.5     05-20    TPro  5.2<L>  /  Alb  2.8<L>  /  TBili  0.6  /  DBili  x   /  AST  35  /  ALT  66<H>  /  AlkPhos  56  05-20    proBNP:   Lipid Profile:   HgA1c:   TSH:     TELE:  EKG:

## 2020-05-20 NOTE — SWALLOW VFSS/MBS ASSESSMENT ADULT - SLP GENERAL OBSERVATIONS
Pt encountered awake and alert, secure in VIANCA chair on room air. +NGT. +Non-verbal. Follows small simple commands with visual cue.

## 2020-05-20 NOTE — PROGRESS NOTE ADULT - SUBJECTIVE AND OBJECTIVE BOX
THE PATIENT WAS SEEN AND EXAMINED BY ME WITH THE HOUSESTAFF AND STROKE TEAM DURING MORNING ROUNDS.   HPI:  87F w/ PMH of HTN, HLD, transferred from [facility] for NSG eval after found to have large L. BG IPH. Patient found to have severe headache with subsequent syncopal episode. Found to be minimally responsive and so initially intubated for airway protection. Was hypertensive,  started on Caredene and prop gtt. Cardene discontinue prior to transfer. Upon arrival, with sedation held she was unable to open her eyes, not following commands, moving spontaneously on the L, RUE loc, RLE WDs. After extensive discussion with the family regarding goals of care, she was brought to the operating room for emergent clot evacuation now extubated.     SUBJECTIVE: No events overnight.  No new neurologic complaints.      acetaminophen   Tablet .. 650 milliGRAM(s) Oral every 6 hours PRN  digoxin    Elixir 125 MICROGram(s) Oral daily  diltiazem    Tablet 90 milliGRAM(s) Oral every 6 hours  doxazosin 2 milliGRAM(s) Oral at bedtime  enoxaparin Injectable 40 milliGRAM(s) SubCutaneous <User Schedule>  levETIRAcetam  Solution 500 milliGRAM(s) Oral two times a day  ondansetron Injectable 4 milliGRAM(s) IV Push every 6 hours PRN  polyethylene glycol 3350 17 Gram(s) Oral every 12 hours  senna 2 Tablet(s) Oral at bedtime  sodium chloride 3%  Inhalation 3 milliLiter(s) Inhalation every 6 hours PRN    PHYSICAL EXAM:   Vital Signs Last 24 Hrs  T(C): 36.9 (20 May 2020 07:13), Max: 36.9 (19 May 2020 19:36)  T(F): 98.4 (20 May 2020 07:13), Max: 98.5 (19 May 2020 19:36)  HR: 86 (20 May 2020 06:00) (72 - 108)  BP: 121/53 (20 May 2020 06:00) (101/52 - 125/56)  BP(mean): 71 (20 May 2020 06:00) (64 - 101)  RR: 14 (20 May 2020 06:00) (12 - 21)  SpO2: 97% (20 May 2020 06:00) (96% - 100%)    General: No acute distress  HEENT: eyes open, right gaze palsy , R HHA  Abdomen: Soft, nontender, nondistended   Extremities: No edema    NEUROLOGICAL EXAM:  Mental status: Awake, eyes open and somewhat minimally attentive then closes again, not following commands, no verbal output   Cranial Nerves: right facial droop, right gaze palsy, right homonymous   Motor exam: left arm moves spontaneously against gravity within bed, LLE with some flexion in bed, right hemiplegia with only trace flexion  Sensation: decreased on right   Coordination/ Gait: gait not assessed    LABS:                        13.2   9.69  )-----------( 164      ( 20 May 2020 05:32 )             40.7    05-20    139  |  102  |  31<H>  ----------------------------<  97  4.0   |  24  |  0.79    Ca    8.9      20 May 2020 05:32  Phos  4.5     05-20    TPro  5.2<L>  /  Alb  2.8<L>  /  TBili  0.6  /  DBili  x   /  AST  35  /  ALT  66<H>  /  AlkPhos  56  05-20      IMAGING: Reviewed by me.     CT Head No Cont (05.11.20)  No change in moderate residual parenchymal hemorrhage in the left frontal parenchyma and basal ganglia compared with 5/10/2020. No change in mass effect.    (05.09.20)  NONCONTRAST CT BRAIN: Large intraparenchymal hemorrhage involving the left basal ganglia, frontal lobe and medial temporal lobe as described above. Near complete effacement of the left lateral ventricle. There is 0.4 cm of rightward midline shift.    CTA BRAIN AND NECK: Patent cervical circulation.  No identified aneurysm or AVM. Decreased size of the left middle cerebral artery may be due to distal branch occlusion or compression by the intraparenchymal hemorrhage.

## 2020-05-20 NOTE — SWALLOW VFSS/MBS ASSESSMENT ADULT - LARYNGEAL PENETRATION DURING THE SWALLOW - SILENT
Trace/trace laryngeal penetration along superior surface of epiglottis during the swallow without complete retrieval

## 2020-05-20 NOTE — SWALLOW VFSS/MBS ASSESSMENT ADULT - LARYNGEAL PENETRATION AFTER THE SWALLOW - COUGH
Trace/Trace amount of lingual residue enters laryngeal vestibule during secondary swallow. Visible residue evident on vocal folds despite cough. Trace/Trace amount of lingual residue enters laryngeal vestibule during secondary swallow. Visible residue evident on vocal folds and arytenoid despite cough.

## 2020-05-20 NOTE — SWALLOW VFSS/MBS ASSESSMENT ADULT - SLP PERTINENT HISTORY OF CURRENT PROBLEM
PMH of HTN, HLD, transferred from [facility] for NSx eval for large L. BG IPH. Patient presented with severe headache with subsequent syncopal episode. Minimally responsive and intubated for airway protection. Was HTN 170s, started on Caredene and prop gtt. Cardene discontinued prior to transfer. Upon arrival, with sedation held she was unable to open her eyes, not following commands, moving spontaneously on the L, RUE loc, RLE WDs. After extensive discussion with the family regarding goals of care, Pt brought to OR for emergent clot evacuation.

## 2020-05-20 NOTE — SWALLOW VFSS/MBS ASSESSMENT ADULT - ORAL PHASE COMMENTS
Total oral transit time noted to be about 5s. Max premature spillage of pharyngeal portion of epiglottis toward pyriform. Trace-min lingual residue noted post swallow, which appears to spill over BOT during subsequent trials. Total oral transit time noted to be >20s. Mod premature spillage to valleculae prior to swallow trigger. Mod lingual-palatal residue noted post swallow. Pt required max verbal cues to trigger secondary swallow. Max premature spillage to valleculae prior to swallow trigger. Oral transit time noted to be 2.5s. Max premature spillage over posterior portion of epiglottis prior to swallow trigger. Min lingual residue noted post swallow.

## 2020-05-20 NOTE — SWALLOW VFSS/MBS ASSESSMENT ADULT - DIAGNOSTIC IMPRESSIONS
Pt presents with oral and pharyngeal dysphagia characterized by lingual pumping, reduced bolus formation, increased oral transit time, decreased AP transit with oral residue and delayed swallow trigger. Pt with reduced sensation to oral residue and does not consistently trigger secondary swallow to clear, resulting in trace deep laryngeal penetration to the vocal cords without clearance despite reflexive cough of thin puree via teaspoon. Pt with expressive and receptive language deficits and unable to utilize strategies to aid in airway protection.    Disorders: reduced lingual strength/ROM/Rate of motion, incomplete tongue to palate contact, delay in trigger of the swallow reflex, and reduced hyo-laryngeal excursion. Pt presents with oral and pharyngeal dysphagia characterized by lingual pumping, reduced bolus formation, increased oral transit time, decreased AP transit with oral residue, incomplete tongue to palate contact with premature spillage to hypopharynx and delayed swallow trigger. Pt with reduced awareness to oral residue and does not consistently trigger secondary swallow to clear. This resulted in trace deep laryngeal penetration to the vocal cords without clearance despite reflexive cough of thin puree via teaspoon. Pt with expressive and receptive language deficits and unable to utilize strategies to aid in airway protection during this exam.    Disorders: reduced lingual strength/ROM/Rate of motion, incomplete tongue to palate contact, delay in trigger of the swallow reflex, and reduced hyo-laryngeal excursion.

## 2020-05-20 NOTE — PROGRESS NOTE ADULT - ASSESSMENT
PAF  Aflutter  HR stable   cont cardizem, Dig  off a/c due to BGH    Hypotension   stable   off BB     Mod AS  monitor clinically for fluid overload   HR control     IPH   fu with nsx

## 2020-05-20 NOTE — CHART NOTE - NSCHARTNOTEFT_GEN_A_CORE
Patient seen for dysphagia follow up. Chart reviewed to date.       Pt received upright in bed, in chani-chair with breakfast this date. Pt non-verbal, following some 1-step commands with verbal and visual cues. Pt with white film on lingual surface- RN aware. VSS. Observed pt with thin and thick puree and honey thick liquids via teaspoon. Pt presented with oral and suspected pharyngeal dysphagia characterized by right labial weakness, increased oral transit time, suspected delayed pharyngeal swallow trigger, multiple swallows with may be indicative of pharyngeal stasis, and delayed cough post thick puree trial. SLP unable to r/o silent aspiration at bedside. Therefore, recommended MBS to objectively assess oropharyngeal swallow function, r/o silent aspiration and determine therapeutic intervention for Pt going to [facility].       -Purposeful proactive rounding reinforced and 5 Ps addressed. Pt left in no distress. Call placed to Neuro team and d/w RN and HAYLEY López.    -Recommendations: 1) NPO, with non-oral nutrition/hydration/medications. 2) MD, PLEASE ENTER ORDER FOR MODIFIED BARIUM SWALLOW STUDY (ENTER UNDER RADIOLOGY EXAMS THE FOLLOWING WAY: GO TO THE BROWSER AND TYPE IN XRAY, THEN HIT THE SPACEBAR, AND TYPE IN THE LETTER M.). MBS Scheduled for today, 5/20. 3) Aspiration precautions for enteral feeds. 4) Please assess white film on Pt lingual surface. Patient seen for dysphagia follow up. Chart reviewed to date.       Pt received upright in bed, in chani-chair with breakfast this date. Pt non-verbal, following some 1-step commands with verbal and visual cues. Pt with white film on lingual surface- RN aware. VSS. Observed pt with thin and thick puree and honey thick liquids via teaspoon. Pt presented with oral and suspected pharyngeal dysphagia characterized by right labial weakness, increased oral transit time, suspected delayed pharyngeal swallow trigger, multiple swallows with may be indicative of pharyngeal stasis, and delayed cough post thick puree trial. SLP unable to r/o silent aspiration at bedside. Therefore, recommended MBS to objectively assess oropharyngeal swallow function, r/o silent aspiration and determine therapeutic intervention for Pt going to [facility].       -Purposeful proactive rounding reinforced and 5 Ps addressed. Pt left in no distress. Call placed to Neuro team and d/w RN and HAYLEY López.    -Recommendations: 1) NPO, with non-oral nutrition/hydration/medications. 2) MD, PLEASE ENTER ORDER FOR MODIFIED BARIUM SWALLOW STUDY (ENTER UNDER RADIOLOGY EXAMS THE FOLLOWING WAY: GO TO THE BROWSER AND TYPE IN XRAY, THEN HIT THE SPACEBAR, AND TYPE IN THE LETTER M.). MBS Scheduled for today, 5/20. 3) Aspiration precautions for enteral feeds. 4) Please assess white film on Pt lingual surface.    Susan Prieto MS CCC-SLP  Speech-Language Pathologist  ; [phone] Patient seen for dysphagia follow up. Chart reviewed to date. Pt with large left frontal/basal ganglia IPH, etiology unknown, possibility of underlying hypertension S/P larry clot removal. Now with PAF/A flutter. Daughter Siri ok with PEG if fails SLP eval on 5/20.        Pt received upright in bed, in chani-chair with breakfast this date. Pt non-verbal, following some 1-step commands with verbal and visual cues. Pt with white film on lingual surface- RN aware. VSS. Observed pt with thin and thick puree and honey thick liquids via teaspoon. Pt presented with oral and suspected pharyngeal dysphagia characterized by right labial weakness, increased oral transit time, suspected delayed pharyngeal swallow trigger, multiple swallows with may be indicative of pharyngeal stasis, and delayed cough post thick puree trial. SLP unable to r/o silent aspiration at bedside. Therefore, recommended MBS to objectively assess oropharyngeal swallow function, r/o silent aspiration and determine therapeutic intervention for Pt going to [facility].       -Purposeful proactive rounding reinforced and 5 Ps addressed. Pt left in no distress. Call placed to Neuro team and d/w RN and HAYLEY López.    -Recommendations: 1) NPO, with non-oral nutrition/hydration/medications. 2) MD, PLEASE ENTER ORDER FOR MODIFIED BARIUM SWALLOW STUDY (ENTER UNDER RADIOLOGY EXAMS THE FOLLOWING WAY: GO TO THE BROWSER AND TYPE IN XRAY, THEN HIT THE SPACEBAR, AND TYPE IN THE LETTER M.). MBS Scheduled for today, 5/20. 3) Aspiration precautions for enteral feeds. 4) Please assess white film on Pt lingual surface.    Susan Prieto MS CCC-SLP  Speech-Language Pathologist  ; [phone]

## 2020-05-20 NOTE — PROGRESS NOTE ADULT - ASSESSMENT
ASSESSMENT: 87F with PMHx of HTN, HLD, presented with large left frontal/basal ganglia IPH, etiology unknown, possibility of underlying hypertension S/P larry clot removal. Now with PAF/A flutter.    NEURO: neurologically without acute change, continue close monitoring for neurologic deterioration as patient with cerebral edema, mass effect, and brain compression, she remains bedbound with immobility due to frailty and functional quadriplegia, maintain normotension, home statin therapy if applicable, MR brain upon resolution of hemorrhage for further evaluation, PMR: [facility].    ANTITHROMBOTIC THERAPY: none at this time in setting of ICH and increased risk for bleeding, pending outpatient follow up- 4 to 6 weeks, repeat CT Head before starting antithrombotic Rx, clinical course and radiological stability will need to determine timeline for initiation of AC vs. Watchman in setting of atrial fibrillation.     PULMONARY: CXR on 5/11 shows enlarged heart, lungs clear, protecting airway, saturating well     CARDIOVASCULAR:  TTE: ef 65-70%, moderate AS, mild AR, LV remodeling, moderate stage II diastolic dysfunction borderline pulmonary htn , cardiac monitoring to ensure rate control, regimen as recommended by Dr. Nix.  Off beta blocker, continue rate control as recommended.                           SBP goal: <140mMHg    GASTROINTESTINAL:  dysphagia screen failed, NGT in place, SLP to re eval on 5/20.  Patients daughter Siri mahajan with PEG if fails SLP eval on 5/20.     Diet: NGT	    RENAL: BUN/Cr without acute change- SCAR now downtrending, maintain adequate hydration, good urine output      Na Goal: Greater than 135     Garcia: n    HEMATOLOGY: H/H without acute change, no active bleeding, Platelets 164     DVT ppx: Heparin s.c [] LMWH [x]     ID: afebrile, no leukocytosis, no si/sx of infection    Other: Pending family discussion and goals of care consider palliative consult in setting of guarded prognosis.     DISPOSITION: [facility]     CORE MEASURES:        Admission NIHSS:      TPA: [] YES [x] NO      LDL/HDL:100/50     Depression Screen: NA     Statin Therapy: as noted     Dysphagia Screen: [] PASS [x] FAIL     Smoking [] YES [x] NO      Afib [x] YES [] NO     Stroke Education [x] YES [] NO    Obtain screening lower extremity venous ultrasound in patients who meet 1 or more of the following criteria as patient is high risk for DVT/PE on admission:   [] History of DVT/PE  []Hypercoagulable states (Factor V Leiden, Cancer, OCP, etc. )  []Prolonged immobility (hemiplegia/hemiparesis/post operative or any other extended immobilization)  [] Transferred from outside facility (Rehab or Long term care)  [] Age </= to 50 ASSESSMENT: 87F with PMHx of HTN, HLD, presented with large left frontal/basal ganglia IPH, etiology unknown, possibility of underlying hypertension S/P larry clot removal. Now with PAF/A flutter.    NEURO: neurologically without acute change, continue close monitoring for neurologic deterioration as patient with cerebral edema, mass effect, and brain compression, she remains bedbound with immobility due to frailty and functional quadriplegia, maintain normotension, home statin therapy if applicable, MR brain upon resolution of hemorrhage for further evaluation, PMR: GINA.    ANTITHROMBOTIC THERAPY: none at this time in setting of ICH and increased risk for bleeding, pending outpatient follow up- 4 to 6 weeks, repeat CT Head before starting antithrombotic Rx, clinical course and radiological stability will need to determine timeline for initiation of AC vs. Watchman in setting of atrial fibrillation.     PULMONARY: CXR on 5/11 shows enlarged heart, lungs clear, protecting airway, saturating well     CARDIOVASCULAR:  TTE: ef 65-70%, moderate AS, mild AR, LV remodeling, moderate stage II diastolic dysfunction borderline pulmonary htn , cardiac monitoring to ensure rate control, regimen as recommended by Dr. Nix.  Off beta blocker, continue rate control as recommended.                           SBP goal: <140mMHg    GASTROINTESTINAL:  dysphagia screen failed, NGT in place, SLP to re eval on 5/, recommend MBS, failed MBS, GI consulted for PEG.     Diet: NPO with NGT	    RENAL: BUN/Cr without acute change- SCAR now downtrending, maintain adequate hydration, good urine output      Na Goal: Greater than 135     Garcia: n    HEMATOLOGY: H/H without acute change, no active bleeding, Platelets 164     DVT ppx: Heparin s.c [] LMWH [x]     ID: afebrile, no leukocytosis, no si/sx of infection    Other: Called sonShahid ([phone]) and updated him on plan and answered all questions.      DISPOSITION: [facility]     CORE MEASURES:        Admission NIHSS:      TPA: [] YES [x] NO      LDL/HDL:100/50     Depression Screen: NA     Statin Therapy: as noted     Dysphagia Screen: [] PASS [x] FAIL     Smoking [] YES [x] NO      Afib [x] YES [] NO     Stroke Education [x] YES [] NO    Obtain screening lower extremity venous ultrasound in patients who meet 1 or more of the following criteria as patient is high risk for DVT/PE on admission:   [] History of DVT/PE  []Hypercoagulable states (Factor V Leiden, Cancer, OCP, etc. )  []Prolonged immobility (hemiplegia/hemiparesis/post operative or any other extended immobilization)  [] Transferred from outside facility (Rehab or Long term care)  [] Age </= to 50

## 2020-05-21 LAB
ANION GAP SERPL CALC-SCNC: 10 MMOL/L — SIGNIFICANT CHANGE UP (ref 5–17)
BUN SERPL-MCNC: 23 MG/DL — SIGNIFICANT CHANGE UP (ref 7–23)
CALCIUM SERPL-MCNC: 8.9 MG/DL — SIGNIFICANT CHANGE UP (ref 8.4–10.5)
CHLORIDE SERPL-SCNC: 105 MMOL/L — SIGNIFICANT CHANGE UP (ref 96–108)
CO2 SERPL-SCNC: 24 MMOL/L — SIGNIFICANT CHANGE UP (ref 22–31)
CREAT SERPL-MCNC: 0.72 MG/DL — SIGNIFICANT CHANGE UP (ref 0.5–1.3)
DIGOXIN SERPL-MCNC: 1 NG/ML — SIGNIFICANT CHANGE UP (ref 0.8–2)
GLUCOSE SERPL-MCNC: 108 MG/DL — HIGH (ref 70–99)
HCT VFR BLD CALC: 38.5 % — SIGNIFICANT CHANGE UP (ref 34.5–45)
HCT VFR BLD CALC: 41.3 % — SIGNIFICANT CHANGE UP (ref 34.5–45)
HGB BLD-MCNC: 12.6 G/DL — SIGNIFICANT CHANGE UP (ref 11.5–15.5)
HGB BLD-MCNC: 13.4 G/DL — SIGNIFICANT CHANGE UP (ref 11.5–15.5)
MCHC RBC-ENTMCNC: 31.6 PG — SIGNIFICANT CHANGE UP (ref 27–34)
MCHC RBC-ENTMCNC: 31.6 PG — SIGNIFICANT CHANGE UP (ref 27–34)
MCHC RBC-ENTMCNC: 32.4 GM/DL — SIGNIFICANT CHANGE UP (ref 32–36)
MCHC RBC-ENTMCNC: 32.7 GM/DL — SIGNIFICANT CHANGE UP (ref 32–36)
MCV RBC AUTO: 96.5 FL — SIGNIFICANT CHANGE UP (ref 80–100)
MCV RBC AUTO: 97.4 FL — SIGNIFICANT CHANGE UP (ref 80–100)
NRBC # BLD: 0 /100 WBCS — SIGNIFICANT CHANGE UP (ref 0–0)
NRBC # BLD: 0 /100 WBCS — SIGNIFICANT CHANGE UP (ref 0–0)
PLATELET # BLD AUTO: 105 K/UL — LOW (ref 150–400)
PLATELET # BLD AUTO: 173 K/UL — SIGNIFICANT CHANGE UP (ref 150–400)
POTASSIUM SERPL-MCNC: 4 MMOL/L — SIGNIFICANT CHANGE UP (ref 3.5–5.3)
POTASSIUM SERPL-SCNC: 4 MMOL/L — SIGNIFICANT CHANGE UP (ref 3.5–5.3)
RBC # BLD: 3.99 M/UL — SIGNIFICANT CHANGE UP (ref 3.8–5.2)
RBC # BLD: 4.24 M/UL — SIGNIFICANT CHANGE UP (ref 3.8–5.2)
RBC # FLD: 13.2 % — SIGNIFICANT CHANGE UP (ref 10.3–14.5)
RBC # FLD: 13.2 % — SIGNIFICANT CHANGE UP (ref 10.3–14.5)
SODIUM SERPL-SCNC: 139 MMOL/L — SIGNIFICANT CHANGE UP (ref 135–145)
WBC # BLD: 10.05 K/UL — SIGNIFICANT CHANGE UP (ref 3.8–10.5)
WBC # BLD: 9.85 K/UL — SIGNIFICANT CHANGE UP (ref 3.8–10.5)
WBC # FLD AUTO: 10.05 K/UL — SIGNIFICANT CHANGE UP (ref 3.8–10.5)
WBC # FLD AUTO: 9.85 K/UL — SIGNIFICANT CHANGE UP (ref 3.8–10.5)

## 2020-05-21 PROCEDURE — 99232 SBSQ HOSP IP/OBS MODERATE 35: CPT

## 2020-05-21 PROCEDURE — 99233 SBSQ HOSP IP/OBS HIGH 50: CPT

## 2020-05-21 RX ORDER — DILTIAZEM HCL 120 MG
20 CAPSULE, EXT RELEASE 24 HR ORAL ONCE
Refills: 0 | Status: COMPLETED | OUTPATIENT
Start: 2020-05-21 | End: 2020-05-21

## 2020-05-21 RX ORDER — SODIUM CHLORIDE 9 MG/ML
1000 INJECTION INTRAMUSCULAR; INTRAVENOUS; SUBCUTANEOUS
Refills: 0 | Status: DISCONTINUED | OUTPATIENT
Start: 2020-05-21 | End: 2020-05-22

## 2020-05-21 RX ORDER — DILTIAZEM HCL 120 MG
10 CAPSULE, EXT RELEASE 24 HR ORAL
Qty: 125 | Refills: 0 | Status: DISCONTINUED | OUTPATIENT
Start: 2020-05-21 | End: 2020-05-23

## 2020-05-21 RX ORDER — LEVETIRACETAM 250 MG/1
500 TABLET, FILM COATED ORAL EVERY 12 HOURS
Refills: 0 | Status: DISCONTINUED | OUTPATIENT
Start: 2020-05-21 | End: 2020-05-22

## 2020-05-21 RX ORDER — METOPROLOL TARTRATE 50 MG
5 TABLET ORAL ONCE
Refills: 0 | Status: COMPLETED | OUTPATIENT
Start: 2020-05-21 | End: 2020-05-21

## 2020-05-21 RX ORDER — METOPROLOL TARTRATE 50 MG
5 TABLET ORAL ONCE
Refills: 0 | Status: DISCONTINUED | OUTPATIENT
Start: 2020-05-21 | End: 2020-05-21

## 2020-05-21 RX ADMIN — Medication 4 MILLILITER(S): at 22:28

## 2020-05-21 RX ADMIN — Medication 90 MILLIGRAM(S): at 06:31

## 2020-05-21 RX ADMIN — Medication 10 MG/HR: at 11:21

## 2020-05-21 RX ADMIN — Medication 20 MILLIGRAM(S): at 07:01

## 2020-05-21 RX ADMIN — Medication 5 MILLIGRAM(S): at 02:25

## 2020-05-21 RX ADMIN — LEVETIRACETAM 400 MILLIGRAM(S): 250 TABLET, FILM COATED ORAL at 17:21

## 2020-05-21 RX ADMIN — Medication 4 MILLILITER(S): at 17:22

## 2020-05-21 RX ADMIN — LEVETIRACETAM 500 MILLIGRAM(S): 250 TABLET, FILM COATED ORAL at 06:31

## 2020-05-21 RX ADMIN — Medication 4 MILLILITER(S): at 11:38

## 2020-05-21 RX ADMIN — Medication 4 MILLILITER(S): at 06:31

## 2020-05-21 RX ADMIN — ENOXAPARIN SODIUM 40 MILLIGRAM(S): 100 INJECTION SUBCUTANEOUS at 17:22

## 2020-05-21 RX ADMIN — Medication 10 MG/HR: at 18:54

## 2020-05-21 RX ADMIN — SODIUM CHLORIDE 50 MILLILITER(S): 9 INJECTION INTRAMUSCULAR; INTRAVENOUS; SUBCUTANEOUS at 11:37

## 2020-05-21 RX ADMIN — Medication 125 MICROGRAM(S): at 06:32

## 2020-05-21 NOTE — PROVIDER CONTACT NOTE (OTHER) - ASSESSMENT
Pt admitted for ICH.  Pt w/ history of afib and episodes of a flutter throughout hospital stay.  Pt HR currently sustaining in 120s and going up to 140s. PM Cardizem given.

## 2020-05-21 NOTE — PROGRESS NOTE ADULT - ASSESSMENT
87F with PMHx of HTN, HLD, presented with large left frontal/basal ganglia IPH, etiology unknown, possibility of underlying hypertension S/P larry clot removal. Now with PAF/A flutter.    NEURO: neurologically without acute change, continue close monitoring for neurologic deterioration as patient with cerebral edema, mass effect, and brain compression, she remains bedbound with immobility due to frailty and functional quadriplegia, maintain normotension, home statin therapy if applicable, MR brain upon resolution of hemorrhage for further evaluation, PMR: GINA.    ANTITHROMBOTIC THERAPY: none at this time in setting of ICH and increased risk for bleeding, pending outpatient follow up- 4 to 6 weeks, repeat CT Head before starting antithrombotic Rx, clinical course and radiological stability will need to determine timeline for initiation of AC vs. Watchman in setting of atrial fibrillation.     PULMONARY: CXR on 5/11 shows enlarged heart, lungs clear, protecting airway, saturating well     CARDIOVASCULAR:  TTE: ef 65-70%, moderate AS, mild AR, LV remodeling, moderate stage II diastolic dysfunction borderline pulmonary htn , cardiac monitoring to ensure rate control, regimen as recommended by Dr. Nix.  Off beta blocker, continue rate control as recommended.                           SBP goal: <140mMHg    GASTROINTESTINAL:  dysphagia screen failed, NGT in place, SLP to re eval on 5/20, recommend MBS, failed MBS, GI consulted for PEG.     Diet: NPO with NGT	    RENAL: BUN/Cr without acute change, maintain adequate hydration, good urine output      Na Goal: Greater than 135     Garcia: n    HEMATOLOGY: H/H without acute change, no active bleeding, Platelets 105- mild thrombocytopenia - continue to monitor     DVT ppx: Heparin s.c [] LMWH [x]     ID: afebrile, no leukocytosis, no si/sx of infection    Other: Called sonShahid ([phone]) and updated him on plan and answered all questions.      DISPOSITION: GINA once stable enteral access placed.     CORE MEASURES:        Admission NIHSS:      TPA: [] YES [x] NO      LDL/HDL:100/50     Depression Screen: NA     Statin Therapy: as noted     Dysphagia Screen: [] PASS [x] FAIL     Smoking [] YES [x] NO      Afib [x] YES [] NO     Stroke Education [x] YES [] NO    Obtain screening lower extremity venous ultrasound in patients who meet 1 or more of the following criteria as patient is high risk for DVT/PE on admission:   [] History of DVT/PE  []Hypercoagulable states (Factor V Leiden, Cancer, OCP, etc. )  []Prolonged immobility (hemiplegia/hemiparesis/post operative or any other extended immobilization)  [] Transferred from outside facility (Rehab or Long term care)  [] Age </= to 50 87F with PMHx of HTN, HLD, presented with large left frontal/basal ganglia IPH, etiology unknown, possibility of underlying hypertension S/P larry clot removal. Now with PAF/A flutter.    NEURO: neurologically without acute change, continue close monitoring for neurologic deterioration as patient with cerebral edema, mass effect, and brain compression, she remains bedbound with immobility due to frailty and functional quadriplegia, maintain normotension, home statin therapy if applicable, MR brain upon resolution of hemorrhage for further evaluation, PMR: GINA.    ANTITHROMBOTIC THERAPY: none at this time in setting of ICH and increased risk for bleeding, pending outpatient follow up- 4 to 6 weeks, repeat CT Head before starting antithrombotic Rx, clinical course and radiological stability will need to determine timeline for initiation of AC vs. Watchman in setting of atrial fibrillation.     PULMONARY: CXR on 5/11 shows enlarged heart, lungs clear, protecting airway, saturating well     CARDIOVASCULAR:  TTE: ef 65-70%, moderate AS, mild AR, LV remodeling, moderate stage II diastolic dysfunction borderline pulmonary htn , cardiac monitoring to ensure rate control, regimen as recommended by Dr. Nix.      SBP goal: <140mMHg    GASTROINTESTINAL:  dysphagia screen failed, NGT removed this AM. SLP evaluated patient on 5/20, recommend MBS, failed MBS, GI consulted for PEG. Plan for PEG on 5/22     Diet: NPO    RENAL: BUN/Cr without acute change, maintain adequate hydration with gentle IVF, good urine output      Na Goal: Greater than 135     Garcia: n    HEMATOLOGY: H/H without acute change, no active bleeding, Platelets 173 (repeated)     DVT ppx: Heparin s.c [] LMWH [x]     ID: afebrile, no leukocytosis, no si/sx of infection    Other: Called sonShahid ([phone]) and updated him on plan and answered all questions.      DISPOSITION: GINA once stable enteral access placed.     CORE MEASURES:        Admission NIHSS:      TPA: [] YES [x] NO      LDL/HDL:100/50     Depression Screen: NA     Statin Therapy: as noted     Dysphagia Screen: [] PASS [x] FAIL     Smoking [] YES [x] NO      Afib [x] YES [] NO     Stroke Education [x] YES [] NO    Obtain screening lower extremity venous ultrasound in patients who meet 1 or more of the following criteria as patient is high risk for DVT/PE on admission:   [] History of DVT/PE  []Hypercoagulable states (Factor V Leiden, Cancer, OCP, etc. )  []Prolonged immobility (hemiplegia/hemiparesis/post operative or any other extended immobilization)  [] Transferred from outside facility (Rehab or Long term care)  [] Age </= to 50

## 2020-05-21 NOTE — CHART NOTE - NSCHARTNOTEFT_GEN_A_CORE
Nutrition Follow Up Note    Patient seen for: stroke unit f/u    Chart reviewed, events noted. Failed MBS, per MD note GI consult for PEG    Source:     Diet :  Diet, NPO:   Tube Feeding Modality: Nasogastric  Jevity 1.2 Tree (JEVITY1.2RTH)  Total Volume for 24 Hours (mL): 1350  Intermittent  Starting Tube Feed Rate {mL per Hour}: 20  Increase Tube Feed Rate by (mL): 10    Every 4 hours  Until Goal Tube Feed Rate (mL per Hour): 75  Tube Feeding Hours ON: 18  Tube Feeding OFF (Hours): 6  Tube Feed Start Time: 15:00     Special Instructions for Nursing:  If failed dysphagia screen, patient to remain NPO (05-19-20 @ 11:29)    Current EN provides: 1620 kcals, 75 gm protein  Nutrition events:  at recommended goal rate    GI: no N/V/abd discomfort  fecal incontinence 5/21 (senna, miralax)    Dosing wt: 82.2 kg, no recent wt    Pertinent Medications: MEDICATIONS  (STANDING):  digoxin    Elixir 125 MICROGram(s) Oral daily  diltiazem Infusion 10 mG/Hr (10 mL/Hr) IV Continuous <Continuous>  doxazosin 2 milliGRAM(s) Oral at bedtime  enoxaparin Injectable 40 milliGRAM(s) SubCutaneous <User Schedule>  levETIRAcetam  Solution 500 milliGRAM(s) Oral two times a day  nystatin    Suspension 4 milliLiter(s) Oral four times a day  polyethylene glycol 3350 17 Gram(s) Oral every 12 hours  propranolol 10 milliGRAM(s) Oral three times a day  senna 2 Tablet(s) Oral at bedtime    MEDICATIONS  (PRN):  acetaminophen   Tablet .. 650 milliGRAM(s) Oral every 6 hours PRN Temp greater or equal to 38C (100.4F), Mild Pain (1 - 3)  ondansetron Injectable 4 milliGRAM(s) IV Push every 6 hours PRN Nausea and/or Vomiting  sodium chloride 3%  Inhalation 3 milliLiter(s) Inhalation every 6 hours PRN secretions    05-21 @ 06:20: Na 139, BUN 23, Cr 0.72, <H>, K+ 4.0, Phos --, Mg --, Alk Phos --, ALT/SGPT --, AST/SGOT --, HbA1c --    Finger Sticks:      Skin per nursing documentation: no pressure injuries documented   Edema: 2+ right arm    Estimated Needs:  Energy: (20-25kcal/kg dosing wt 82.2kg): 6762-1328 kcal  Protein: (1.4-1.6g protein/kg IBW 58.9kg): 82-94g protein     Previous Nutrition Diagnosis: increased needs  Nutrition Diagnosis is: ongoing, addressed w/EN    New Nutrition Diagnosis: swallowing difficulty  Related to: likely neurological causes  As evidenced by: +dysphagia per ST, will need PEG placement     Interventions:     Recommend  1) continue Jevity 1.2 at 75 cc/hr x 18 hrs provides 1620 kcals, 75 gm protein, 1089cc free water  meets 20 Kcal/Kg dosing wt 82.2 kg,  1.3Gm/kg IBW 58.9 kg    Monitoring and Evaluation:     Continue to monitor Nutritional intake, Tolerance to diet prescription, weights, labs, skin integrity    RD remains available upon request and will follow up per protocol

## 2020-05-21 NOTE — PROGRESS NOTE ADULT - SUBJECTIVE AND OBJECTIVE BOX
Subjective: Patient seen and examined. No new events except as noted.     SUBJECTIVE/ROS:    Due to altered mental status, subjective information were not able to be obtained from the patient. History was obtained, to the extent possible, from review of the chart and collateral sources of information.     MEDICATIONS:  MEDICATIONS  (STANDING):  digoxin    Elixir 125 MICROGram(s) Oral daily  diltiazem Infusion 10 mG/Hr (10 mL/Hr) IV Continuous <Continuous>  diltiazem Injectable 20 milliGRAM(s) IV Push once  doxazosin 2 milliGRAM(s) Oral at bedtime  enoxaparin Injectable 40 milliGRAM(s) SubCutaneous <User Schedule>  levETIRAcetam  Solution 500 milliGRAM(s) Oral two times a day  nystatin    Suspension 4 milliLiter(s) Oral four times a day  polyethylene glycol 3350 17 Gram(s) Oral every 12 hours  propranolol 10 milliGRAM(s) Oral three times a day  senna 2 Tablet(s) Oral at bedtime      PHYSICAL EXAM:  T(C): 36.8 (05-20-20 @ 22:00), Max: 37.2 (05-20-20 @ 15:37)  HR: 71 (05-21-20 @ 04:00) (71 - 132)  BP: 128/65 (05-21-20 @ 04:00) (97/64 - 136/98)  RR: 15 (05-21-20 @ 04:00) (14 - 23)  SpO2: 98% (05-21-20 @ 04:00) (96% - 99%)  Wt(kg): --  I&O's Summary    19 May 2020 07:01  -  20 May 2020 07:00  --------------------------------------------------------  IN: 1865 mL / OUT: 1300 mL / NET: 565 mL    20 May 2020 07:01  -  21 May 2020 06:52  --------------------------------------------------------  IN: 975 mL / OUT: 2000 mL / NET: -1025 mL            JVP: Normal  Neck: supple  Lung: clear   CV: S1 S2 , Murmur:  Abd: soft  Ext: No edema  Psych: flat affect  Skin: normal``    LABS/DATA:    CARDIAC MARKERS:                                12.6   10.05 )-----------( x        ( 21 May 2020 06:20 )             38.5     05-20    139  |  102  |  31<H>  ----------------------------<  97  4.0   |  24  |  0.79    Ca    8.9      20 May 2020 05:32  Phos  4.5     05-20    TPro  5.2<L>  /  Alb  2.8<L>  /  TBili  0.6  /  DBili  x   /  AST  35  /  ALT  66<H>  /  AlkPhos  56  05-20    proBNP:   Lipid Profile:   HgA1c:   TSH:     TELE:  EKG:

## 2020-05-21 NOTE — CONSULT NOTE ADULT - SUBJECTIVE AND OBJECTIVE BOX
[facility]  Alan Vargas PA-C  [address] [place] [address]  [phone]      Chief Complaint:  Patient is a 84y old  Female who presents with a chief complaint of IPH (21 May 2020 11:28)      HPI: 84F w/ PMH of HTN, HLD, transferred from [facility] for NSG eval after found to have large L. BG IPH. Patient found to have severe headache with subsequent syncopal episode. Found to be minimally responsive and so initially intubated for airway protection. Was hypertensive,  started on Caredene and prop gtt. Cardene discontinue prior to transfer. Upon arrival, with sedation held she was unable to open her eyes, not following commands, moving spontaneously on the L, RUE loc, RLE WDs. After extensive discussion with the family regarding goals of care, she was brought to the operating room for emergent clot evacuation now extubated.   failed multiple s/s eval  asked to eval for peg    Allergies:  No Known Allergies      Medications:  acetaminophen   Tablet .. 650 milliGRAM(s) Oral every 6 hours PRN  digoxin    Elixir 125 MICROGram(s) Oral daily  diltiazem Infusion 10 mG/Hr IV Continuous <Continuous>  doxazosin 2 milliGRAM(s) Oral at bedtime  enoxaparin Injectable 40 milliGRAM(s) SubCutaneous <User Schedule>  levETIRAcetam  IVPB 500 milliGRAM(s) IV Intermittent every 12 hours  nystatin    Suspension 4 milliLiter(s) Oral four times a day  ondansetron Injectable 4 milliGRAM(s) IV Push every 6 hours PRN  polyethylene glycol 3350 17 Gram(s) Oral every 12 hours  propranolol 10 milliGRAM(s) Oral three times a day  senna 2 Tablet(s) Oral at bedtime  sodium chloride 0.9%. 1000 milliLiter(s) IV Continuous <Continuous>  sodium chloride 3%  Inhalation 3 milliLiter(s) Inhalation every 6 hours PRN      PMHX/PSHX:  HTN (hypertension)      Family history:      Social History:     ROS:     unable to obtain        PHYSICAL EXAM:   Vital Signs:  Vital Signs Last 24 Hrs  T(C): 37.4 (21 May 2020 07:01), Max: 37.4 (21 May 2020 07:01)  T(F): 99.3 (21 May 2020 07:01), Max: 99.3 (21 May 2020 07:01)  HR: 95 (21 May 2020 15:00) (65 - 132)  BP: 123/53 (21 May 2020 15:00) (97/64 - 143/109)  BP(mean): 74 (21 May 2020 15:00) (68 - 120)  RR: 22 (21 May 2020 15:00) (14 - 23)  SpO2: 97% (21 May 2020 15:00) (96% - 99%)  Daily     Daily     nad  confused  frail  non toxic  soft, nt  no edema    LABS:                        13.4   9.85  )-----------( 173      ( 21 May 2020 10:04 )             41.3     05-21    139  |  105  |  23  ----------------------------<  108<H>  4.0   |  24  |  0.72    Ca    8.9      21 May 2020 06:20  Phos  4.5     05-20    TPro  5.2<L>  /  Alb  2.8<L>  /  TBili  0.6  /  DBili  x   /  AST  35  /  ALT  66<H>  /  AlkPhos  56  05-20    LIVER FUNCTIONS - ( 20 May 2020 05:32 )  Alb: 2.8 g/dL / Pro: 5.2 g/dL / ALK PHOS: 56 U/L / ALT: 66 U/L / AST: 35 U/L / GGT: x                   Imaging:

## 2020-05-21 NOTE — PROGRESS NOTE ADULT - ASSESSMENT
aflutter 2:1 conduction  change cardizem to infusion  20 mg iv now   change bb to propranolol      Orders placed

## 2020-05-21 NOTE — CONSULT NOTE ADULT - ASSESSMENT
dysphagia  cva    cont TF via NGT  npo p mn  plan for PEG on friday pm  d/w teofilo becerra who agrees  will need repeat covid swab

## 2020-05-21 NOTE — PROVIDER CONTACT NOTE (OTHER) - SITUATION
Pt admitted for ICH.  Pt w/ history of afib and episodes of a flutter throughout hospital stay. HR currently sustaining in 120s and going up to 140s.

## 2020-05-21 NOTE — PROGRESS NOTE ADULT - SUBJECTIVE AND OBJECTIVE BOX
HPI:  Patient seen and examined.  Mod A x 2 with transfers.    MEDICATIONS  (STANDING):  digoxin    Elixir 125 MICROGram(s) Oral daily  diltiazem Infusion 10 mG/Hr (10 mL/Hr) IV Continuous <Continuous>  doxazosin 2 milliGRAM(s) Oral at bedtime  enoxaparin Injectable 40 milliGRAM(s) SubCutaneous <User Schedule>  levETIRAcetam  IVPB 500 milliGRAM(s) IV Intermittent every 12 hours  nystatin    Suspension 4 milliLiter(s) Oral four times a day  polyethylene glycol 3350 17 Gram(s) Oral every 12 hours  propranolol 10 milliGRAM(s) Oral three times a day  senna 2 Tablet(s) Oral at bedtime  sodium chloride 0.9%. 1000 milliLiter(s) (50 mL/Hr) IV Continuous <Continuous>    MEDICATIONS  (PRN):  acetaminophen   Tablet .. 650 milliGRAM(s) Oral every 6 hours PRN Temp greater or equal to 38C (100.4F), Mild Pain (1 - 3)  ondansetron Injectable 4 milliGRAM(s) IV Push every 6 hours PRN Nausea and/or Vomiting  sodium chloride 3%  Inhalation 3 milliLiter(s) Inhalation every 6 hours PRN secretions    Vital Signs Last 24 Hrs  T(C): 37.4 (21 May 2020 07:01), Max: 37.4 (21 May 2020 07:01)  T(F): 99.3 (21 May 2020 07:01), Max: 99.3 (21 May 2020 07:01)  HR: 82 (21 May 2020 10:00) (65 - 132)  BP: 125/57 (21 May 2020 10:00) (97/64 - 143/109)  BP(mean): 75 (21 May 2020 10:00) (68 - 120)  RR: 20 (21 May 2020 10:00) (14 - 23)  SpO2: 99% (21 May 2020 10:00) (96% - 99%)RR: 18 (20 May 2020 12:11) (12 - 21)  SpO2: 97% (20 May 2020 12:11) (96% - 98%)    PHYSICAL EXAM  Constitutional - NAD, Comfortable   HEENT - Incision intact, +NGT   Neck - Supple  Chest - CTA bilaterally  Cardiovascular - RRR, S1S2  Abdomen - BS+, Soft, NTND  Extremities - No C/C/E, No calf tenderness   Neurologic Exam -                  Aphasia   + alert   Follows some verbal instruction inconsistently   RUE/RLE 0/5, LUE/LLE 4+/5                           13.2   9.69  )-----------( 164      ( 20 May 2020 05:32 )             40.7     05-20    139  |  102  |  31<H>  ----------------------------<  97  4.0   |  24  |  0.79    Ca    8.9      20 May 2020 05:32  Phos  4.5     05-20    TPro  5.2<L>  /  Alb  2.8<L>  /  TBili  0.6  /  DBili  x   /  AST  35  /  ALT  66<H>  /  AlkPhos  56  05-20                      ASSESSMENT/PLAN  87 year-old woman with history of hypertension and hyperlipidemia presented with large left BG hemorrhage s/p left larry evacuation Now with functional, gait, ADL, impairments.    PT - ROM, Bed Mob, Transfers, Amb with AD   OT - ADLs, ROM  SLP - Dysphagia eval and treat  Precautions - Falls, Cardiac  DVT Prophylaxis - Lovenox   Skin - Turn Q2hrs  Disposition recommendation-  GINA

## 2020-05-22 LAB
ANION GAP SERPL CALC-SCNC: 9 MMOL/L — SIGNIFICANT CHANGE UP (ref 5–17)
APPEARANCE UR: ABNORMAL
BACTERIA # UR AUTO: ABNORMAL
BILIRUB UR-MCNC: NEGATIVE — SIGNIFICANT CHANGE UP
BUN SERPL-MCNC: 20 MG/DL — SIGNIFICANT CHANGE UP (ref 7–23)
CALCIUM SERPL-MCNC: 8.7 MG/DL — SIGNIFICANT CHANGE UP (ref 8.4–10.5)
CHLORIDE SERPL-SCNC: 106 MMOL/L — SIGNIFICANT CHANGE UP (ref 96–108)
CO2 SERPL-SCNC: 21 MMOL/L — LOW (ref 22–31)
COLOR SPEC: YELLOW — SIGNIFICANT CHANGE UP
CREAT SERPL-MCNC: 0.66 MG/DL — SIGNIFICANT CHANGE UP (ref 0.5–1.3)
DIFF PNL FLD: ABNORMAL
EPI CELLS # UR: SIGNIFICANT CHANGE UP
GLUCOSE SERPL-MCNC: 97 MG/DL — SIGNIFICANT CHANGE UP (ref 70–99)
GLUCOSE UR QL: NEGATIVE — SIGNIFICANT CHANGE UP
HCT VFR BLD CALC: 39 % — SIGNIFICANT CHANGE UP (ref 34.5–45)
HCT VFR BLD CALC: 40.6 % — SIGNIFICANT CHANGE UP (ref 34.5–45)
HGB BLD-MCNC: 12.6 G/DL — SIGNIFICANT CHANGE UP (ref 11.5–15.5)
HGB BLD-MCNC: 13.3 G/DL — SIGNIFICANT CHANGE UP (ref 11.5–15.5)
KETONES UR-MCNC: NEGATIVE — SIGNIFICANT CHANGE UP
LEUKOCYTE ESTERASE UR-ACNC: ABNORMAL
MCHC RBC-ENTMCNC: 31.4 PG — SIGNIFICANT CHANGE UP (ref 27–34)
MCHC RBC-ENTMCNC: 31.8 PG — SIGNIFICANT CHANGE UP (ref 27–34)
MCHC RBC-ENTMCNC: 32.3 GM/DL — SIGNIFICANT CHANGE UP (ref 32–36)
MCHC RBC-ENTMCNC: 32.8 GM/DL — SIGNIFICANT CHANGE UP (ref 32–36)
MCV RBC AUTO: 97.1 FL — SIGNIFICANT CHANGE UP (ref 80–100)
MCV RBC AUTO: 97.3 FL — SIGNIFICANT CHANGE UP (ref 80–100)
NITRITE UR-MCNC: NEGATIVE — SIGNIFICANT CHANGE UP
NRBC # BLD: 0 /100 WBCS — SIGNIFICANT CHANGE UP (ref 0–0)
NRBC # BLD: 0 /100 WBCS — SIGNIFICANT CHANGE UP (ref 0–0)
PH UR: 8.5 — HIGH (ref 5–8)
PLATELET # BLD AUTO: 106 K/UL — LOW (ref 150–400)
PLATELET # BLD AUTO: 172 K/UL — SIGNIFICANT CHANGE UP (ref 150–400)
POTASSIUM SERPL-MCNC: 3.9 MMOL/L — SIGNIFICANT CHANGE UP (ref 3.5–5.3)
POTASSIUM SERPL-SCNC: 3.9 MMOL/L — SIGNIFICANT CHANGE UP (ref 3.5–5.3)
PROT UR-MCNC: ABNORMAL
RBC # BLD: 4.01 M/UL — SIGNIFICANT CHANGE UP (ref 3.8–5.2)
RBC # BLD: 4.18 M/UL — SIGNIFICANT CHANGE UP (ref 3.8–5.2)
RBC # FLD: 13.2 % — SIGNIFICANT CHANGE UP (ref 10.3–14.5)
RBC # FLD: 13.3 % — SIGNIFICANT CHANGE UP (ref 10.3–14.5)
RBC CASTS # UR COMP ASSIST: 5 /HPF — SIGNIFICANT CHANGE UP (ref 0–4)
SARS-COV-2 RNA SPEC QL NAA+PROBE: SIGNIFICANT CHANGE UP
SODIUM SERPL-SCNC: 136 MMOL/L — SIGNIFICANT CHANGE UP (ref 135–145)
SP GR SPEC: 1.01 — SIGNIFICANT CHANGE UP (ref 1.01–1.02)
UROBILINOGEN FLD QL: NEGATIVE — SIGNIFICANT CHANGE UP
WBC # BLD: 8.28 K/UL — SIGNIFICANT CHANGE UP (ref 3.8–10.5)
WBC # BLD: 8.34 K/UL — SIGNIFICANT CHANGE UP (ref 3.8–10.5)
WBC # FLD AUTO: 8.28 K/UL — SIGNIFICANT CHANGE UP (ref 3.8–10.5)
WBC # FLD AUTO: 8.34 K/UL — SIGNIFICANT CHANGE UP (ref 3.8–10.5)
WBC UR QL: >50

## 2020-05-22 PROCEDURE — 99232 SBSQ HOSP IP/OBS MODERATE 35: CPT

## 2020-05-22 PROCEDURE — 99233 SBSQ HOSP IP/OBS HIGH 50: CPT

## 2020-05-22 RX ORDER — DILTIAZEM HCL 120 MG
90 CAPSULE, EXT RELEASE 24 HR ORAL EVERY 6 HOURS
Refills: 0 | Status: DISCONTINUED | OUTPATIENT
Start: 2020-05-22 | End: 2020-05-28

## 2020-05-22 RX ORDER — ACETAMINOPHEN 500 MG
650 TABLET ORAL EVERY 6 HOURS
Refills: 0 | Status: DISCONTINUED | OUTPATIENT
Start: 2020-05-22 | End: 2020-05-28

## 2020-05-22 RX ORDER — LEVETIRACETAM 250 MG/1
500 TABLET, FILM COATED ORAL ONCE
Refills: 0 | Status: COMPLETED | OUTPATIENT
Start: 2020-05-22 | End: 2020-05-22

## 2020-05-22 RX ORDER — POLYETHYLENE GLYCOL 3350 17 G/17G
17 POWDER, FOR SOLUTION ORAL EVERY 12 HOURS
Refills: 0 | Status: DISCONTINUED | OUTPATIENT
Start: 2020-05-22 | End: 2020-05-28

## 2020-05-22 RX ORDER — SENNA PLUS 8.6 MG/1
2 TABLET ORAL AT BEDTIME
Refills: 0 | Status: DISCONTINUED | OUTPATIENT
Start: 2020-05-22 | End: 2020-05-28

## 2020-05-22 RX ORDER — LEVETIRACETAM 250 MG/1
500 TABLET, FILM COATED ORAL
Refills: 0 | Status: DISCONTINUED | OUTPATIENT
Start: 2020-05-23 | End: 2020-05-28

## 2020-05-22 RX ORDER — DIGOXIN 250 MCG
125 TABLET ORAL DAILY
Refills: 0 | Status: DISCONTINUED | OUTPATIENT
Start: 2020-05-22 | End: 2020-05-23

## 2020-05-22 RX ORDER — DOXAZOSIN MESYLATE 4 MG
2 TABLET ORAL AT BEDTIME
Refills: 0 | Status: DISCONTINUED | OUTPATIENT
Start: 2020-05-22 | End: 2020-05-28

## 2020-05-22 RX ADMIN — Medication 4 MILLILITER(S): at 12:57

## 2020-05-22 RX ADMIN — Medication 4 MILLILITER(S): at 22:43

## 2020-05-22 RX ADMIN — LEVETIRACETAM 400 MILLIGRAM(S): 250 TABLET, FILM COATED ORAL at 17:27

## 2020-05-22 RX ADMIN — Medication 4 MILLILITER(S): at 17:26

## 2020-05-22 RX ADMIN — ENOXAPARIN SODIUM 40 MILLIGRAM(S): 100 INJECTION SUBCUTANEOUS at 17:27

## 2020-05-22 RX ADMIN — Medication 4 MILLILITER(S): at 06:49

## 2020-05-22 RX ADMIN — Medication 650 MILLIGRAM(S): at 22:45

## 2020-05-22 RX ADMIN — LEVETIRACETAM 400 MILLIGRAM(S): 250 TABLET, FILM COATED ORAL at 06:49

## 2020-05-22 RX ADMIN — Medication 2 MILLIGRAM(S): at 22:42

## 2020-05-22 NOTE — PROGRESS NOTE ADULT - SUBJECTIVE AND OBJECTIVE BOX
THE PATIENT WAS SEEN AND EXAMINED BY ME WITH THE HOUSESTAFF AND STROKE TEAM DURING MORNING ROUNDS.   HPI:  87F w/ PMH of HTN, HLD, transferred from [facility] for NSG eval after found to have large L. BG IPH. Patient found to have severe headache with subsequent syncopal episode. Found to be minimally responsive and so initially intubated for airway protection. Was hypertensive,  started on Caredene and prop gtt. Cardene discontinue prior to transfer. Upon arrival, with sedation held she was unable to open her eyes, not following commands, moving spontaneously on the L, RUE loc, RLE WDs. After extensive discussion with the family regarding goals of care, she was brought to the operating room for emergent clot evacuation now extubated.     SUBJECTIVE: No events overnight.  No new neurologic complaints.      acetaminophen   Tablet .. 650 milliGRAM(s) Oral every 6 hours PRN  digoxin    Elixir 125 MICROGram(s) Oral daily  diltiazem Infusion 10 mG/Hr IV Continuous <Continuous>  doxazosin 2 milliGRAM(s) Oral at bedtime  enoxaparin Injectable 40 milliGRAM(s) SubCutaneous <User Schedule>  levETIRAcetam  IVPB 500 milliGRAM(s) IV Intermittent every 12 hours  nystatin    Suspension 4 milliLiter(s) Oral four times a day  ondansetron Injectable 4 milliGRAM(s) IV Push every 6 hours PRN  polyethylene glycol 3350 17 Gram(s) Oral every 12 hours  propranolol 10 milliGRAM(s) Oral three times a day  senna 2 Tablet(s) Oral at bedtime  sodium chloride 0.9%. 1000 milliLiter(s) IV Continuous <Continuous>  sodium chloride 3%  Inhalation 3 milliLiter(s) Inhalation every 6 hours PRN    PHYSICAL EXAM:   Vital Signs Last 24 Hrs  T(C): 36.6 (22 May 2020 07:29), Max: 36.8 (21 May 2020 20:00)  T(F): 97.8 (22 May 2020 07:29), Max: 98.2 (21 May 2020 20:00)  HR: 71 (22 May 2020 08:00) (63 - 120)  BP: 111/57 (22 May 2020 08:00) (96/51 - 152/58)  BP(mean): 75 (22 May 2020 08:00) (64 - 120)  RR: 12 (22 May 2020 08:00) (12 - 32)  SpO2: 99% (22 May 2020 08:00) (91% - 99%)    General: No acute distress  HEENT: eyes open, right gaze palsy , R HHA  Abdomen: Soft, nontender, nondistended   Extremities: No edema    NEUROLOGICAL EXAM:  Mental status: Awake, eyes open and somewhat minimally attentive, not following commands, some unintelligible speech   Cranial Nerves: right facial droop, right gaze palsy, right homonymous   Motor exam: left arm moves spontaneously against gravity within bed, LLE with some flexion in bed, right hemiplegia RUE/RLE 0/5.  Sensation: decreased on right   Coordination/ Gait: gait not assessed    LABS:                        13.3   8.34  )-----------( 106      ( 22 May 2020 05:16 )             40.6    05-22    136  |  106  |  20  ----------------------------<  97  3.9   |  21<L>  |  0.66    Ca    8.7      22 May 2020 05:16    IMAGING: Reviewed by me.     CT Head No Cont (05.11.20)  No change in moderate residual parenchymal hemorrhage in the left frontal parenchyma and basal ganglia compared with 5/10/2020. No change in mass effect.    (05.09.20)  NONCONTRAST CT BRAIN: Large intraparenchymal hemorrhage involving the left basal ganglia, frontal lobe and medial temporal lobe as described above. Near complete effacement of the left lateral ventricle. There is 0.4 cm of rightward midline shift.    CTA BRAIN AND NECK: Patent cervical circulation.  No identified aneurysm or AVM. Decreased size of the left middle cerebral artery may be due to distal branch occlusion or compression by the intraparenchymal hemorrhage.

## 2020-05-22 NOTE — PROGRESS NOTE ADULT - ASSESSMENT
87F with PMHx of HTN, HLD, presented with large left frontal/basal ganglia IPH, etiology unknown, possibility of underlying hypertension S/P larry clot removal. Now with PAF/A flutter.    NEURO: neurologically without acute change, continue close monitoring for neurologic deterioration as patient with cerebral edema, mass effect, and brain compression, she remains bedbound with immobility due to frailty and functional quadriplegia, maintain normotension, home statin therapy if applicable, MR brain upon resolution of hemorrhage for further evaluation, PMR: GINA.    ANTITHROMBOTIC THERAPY: none at this time in setting of ICH and increased risk for bleeding, pending outpatient follow up- 4 to 6 weeks, repeat CT Head before starting antithrombotic Rx, clinical course and radiological stability will need to determine timeline for initiation of AC vs. Watchman in setting of atrial fibrillation.     PULMONARY: CXR on 5/11 shows enlarged heart, lungs clear, protecting airway, saturating well     CARDIOVASCULAR:  TTE: ef 65-70%, moderate AS, mild AR, LV remodeling, moderate stage II diastolic dysfunction borderline pulmonary htn , cardiac monitoring to ensure rate control, regimen as recommended by Dr. Nix.      SBP goal: <140mMHg    GASTROINTESTINAL:  dysphagia screen failed, NGT removed this AM. SLP evaluated patient on 5/20, recommend MBS, failed MBS, GI consulted for PEG. Plan for PEG on 5/22     Diet: NPO    RENAL: BUN/Cr without acute change, maintain adequate hydration with gentle IVF, good urine output      Na Goal: Greater than 135     Garcia: n    HEMATOLOGY: H/H without acute change, no active bleeding, Platelets ? (repeated)     DVT ppx: Heparin s.c [] LMWH [x]     ID: afebrile, no leukocytosis, no si/sx of infection    Other: Called sonShahid ([phone]) and updated him on plan and answered all questions.      DISPOSITION: GINA once stable enteral access placed.     CORE MEASURES:        Admission NIHSS:      TPA: [] YES [x] NO      LDL/HDL:100/50     Depression Screen: NA     Statin Therapy: as noted     Dysphagia Screen: [] PASS [x] FAIL     Smoking [] YES [x] NO      Afib [x] YES [] NO     Stroke Education [x] YES [] NO    Obtain screening lower extremity venous ultrasound in patients who meet 1 or more of the following criteria as patient is high risk for DVT/PE on admission:   [] History of DVT/PE  []Hypercoagulable states (Factor V Leiden, Cancer, OCP, etc. )  []Prolonged immobility (hemiplegia/hemiparesis/post operative or any other extended immobilization)  [] Transferred from outside facility (Rehab or Long term care)  [] Age </= to 50 87F with PMHx of HTN, HLD, presented with large left frontal/basal ganglia IPH, etiology unknown, possibility of underlying hypertension S/P larry clot removal. Now with PAF/A flutter.    NEURO: neurologically without acute change, continue close monitoring for neurologic deterioration as patient with cerebral edema, mass effect, and brain compression, she remains bedbound with immobility due to frailty and functional quadriplegia, maintain normotension, home statin therapy if applicable, MR brain upon resolution of hemorrhage for further evaluation, PMR: GINA.    ANTITHROMBOTIC THERAPY: none at this time in setting of ICH and increased risk for bleeding, pending outpatient follow up- 4 to 6 weeks, repeat CT Head before starting antithrombotic Rx, clinical course and radiological stability will need to determine timeline for initiation of AC vs. Watchman in setting of atrial fibrillation.     PULMONARY: CXR on 5/11 shows enlarged heart, lungs clear, protecting airway, saturating well     CARDIOVASCULAR:  TTE: ef 65-70%, moderate AS, mild AR, LV remodeling, moderate stage II diastolic dysfunction borderline pulmonary htn , cardiac monitoring to ensure rate control, regimen as recommended by Dr. Nix.      SBP goal: <140mMHg    GASTROINTESTINAL:  dysphagia screen failed, NGT removed this AM. SLP evaluated patient on 5/20, recommend MBS, failed MBS, GI consulted for PEG. Plan for PEG on 5/22     Diet: NPO    RENAL: BUN/Cr without acute change, maintain adequate hydration with gentle IVF, good urine output      Na Goal: Greater than 135     Garcia: n    HEMATOLOGY: H/H without acute change, no active bleeding, Platelets 172 (repeated)     DVT ppx: Heparin s.c [] LMWH [x]     ID: afebrile, no leukocytosis, no si/sx of infection    Other: Called sonShahid ([phone]) and updated him on plan and answered all questions.      DISPOSITION: GINA once stable enteral access placed and tolerating tube feedings.     CORE MEASURES:        Admission NIHSS:      TPA: [] YES [x] NO      LDL/HDL:100/50     Depression Screen: NA     Statin Therapy: as noted     Dysphagia Screen: [] PASS [x] FAIL     Smoking [] YES [x] NO      Afib [x] YES [] NO     Stroke Education [x] YES [] NO    Obtain screening lower extremity venous ultrasound in patients who meet 1 or more of the following criteria as patient is high risk for DVT/PE on admission:   [] History of DVT/PE  []Hypercoagulable states (Factor V Leiden, Cancer, OCP, etc. )  []Prolonged immobility (hemiplegia/hemiparesis/post operative or any other extended immobilization)  [] Transferred from outside facility (Rehab or Long term care)  [] Age </= to 50

## 2020-05-22 NOTE — PROGRESS NOTE ADULT - SUBJECTIVE AND OBJECTIVE BOX
HPI:  Patient seen and examined.  Mod A x 2 with transfers.    MEDICATIONS  (STANDING):  digoxin    Elixir 125 MICROGram(s) Oral daily  diltiazem Infusion 10 mG/Hr (10 mL/Hr) IV Continuous <Continuous>  doxazosin 2 milliGRAM(s) Oral at bedtime  enoxaparin Injectable 40 milliGRAM(s) SubCutaneous <User Schedule>  levETIRAcetam  IVPB 500 milliGRAM(s) IV Intermittent every 12 hours  nystatin    Suspension 4 milliLiter(s) Oral four times a day  polyethylene glycol 3350 17 Gram(s) Oral every 12 hours  propranolol 10 milliGRAM(s) Oral three times a day  senna 2 Tablet(s) Oral at bedtime  sodium chloride 0.9%. 1000 milliLiter(s) (50 mL/Hr) IV Continuous <Continuous>    MEDICATIONS  (PRN):  acetaminophen   Tablet .. 650 milliGRAM(s) Oral every 6 hours PRN Temp greater or equal to 38C (100.4F), Mild Pain (1 - 3)  ondansetron Injectable 4 milliGRAM(s) IV Push every 6 hours PRN Nausea and/or Vomiting  sodium chloride 3%  Inhalation 3 milliLiter(s) Inhalation every 6 hours PRN secretions    Vital Signs Last 24 Hrs  T(C): 36.6 (22 May 2020 07:29), Max: 36.8 (21 May 2020 20:00)  T(F): 97.8 (22 May 2020 07:29), Max: 98.2 (21 May 2020 20:00)  HR: 71 (22 May 2020 13:00) (63 - 120)  BP: 122/57 (22 May 2020 13:00) (93/55 - 152/58)  BP(mean): 76 (22 May 2020 13:00) (64 - 120)  RR: 19 (22 May 2020 13:00) (12 - 32)  SpO2: 99% (22 May 2020 13:00) (91% - 99%)    PHYSICAL EXAM  Constitutional - NAD, Comfortable   HEENT - Incision intact, +NGT   Neck - Supple  Chest - CTA bilaterally  Cardiovascular - RRR, S1S2  Abdomen - BS+, Soft, NTND  Extremities - No C/C/E, No calf tenderness   Neurologic Exam -                  Aphasia   + alert   Follows some verbal instruction inconsistently   RUE/RLE 0/5, LUE/LLE 4+/5                            12.6   8.28  )-----------( 172      ( 22 May 2020 10:04 )             39.0     05-22    136  |  106  |  20  ----------------------------<  97  3.9   |  21<L>  |  0.66    Ca    8.7      22 May 2020 05:16               ASSESSMENT/PLAN  87 year-old woman with history of hypertension and hyperlipidemia presented with large left BG hemorrhage s/p left larry evacuation Now with functional, gait, ADL, impairments.    PT - ROM, Bed Mob, Transfers, Amb with AD   OT - ADLs, ROM  SLP - Dysphagia eval and treat  Precautions - Falls, Cardiac  DVT Prophylaxis - Lovenox   Skin - Turn Q2hrs  Disposition recommendation-  GINA

## 2020-05-22 NOTE — PROGRESS NOTE ADULT - ASSESSMENT
Aflutter    HR much better  can change cardizem to po 90 q6h  increase propranolol to 20 tid   cont dig  off a/c due to sah

## 2020-05-22 NOTE — PROGRESS NOTE ADULT - SUBJECTIVE AND OBJECTIVE BOX
Subjective: Patient seen and examined. No new events except as noted.     SUBJECTIVE/ROS:        MEDICATIONS:  MEDICATIONS  (STANDING):  digoxin    Elixir 125 MICROGram(s) Oral daily  diltiazem Infusion 10 mG/Hr (10 mL/Hr) IV Continuous <Continuous>  doxazosin 2 milliGRAM(s) Oral at bedtime  enoxaparin Injectable 40 milliGRAM(s) SubCutaneous <User Schedule>  levETIRAcetam  IVPB 500 milliGRAM(s) IV Intermittent every 12 hours  nystatin    Suspension 4 milliLiter(s) Oral four times a day  polyethylene glycol 3350 17 Gram(s) Oral every 12 hours  propranolol 10 milliGRAM(s) Oral three times a day  senna 2 Tablet(s) Oral at bedtime  sodium chloride 0.9%. 1000 milliLiter(s) (50 mL/Hr) IV Continuous <Continuous>      PHYSICAL EXAM:  T(C): 36.6 (05-22-20 @ 07:29), Max: 36.8 (05-21-20 @ 20:00)  HR: 71 (05-22-20 @ 08:00) (63 - 120)  BP: 111/57 (05-22-20 @ 08:00) (96/51 - 152/58)  RR: 12 (05-22-20 @ 08:00) (12 - 32)  SpO2: 99% (05-22-20 @ 08:00) (91% - 99%)  Wt(kg): --  I&O's Summary    21 May 2020 07:01  -  22 May 2020 07:00  --------------------------------------------------------  IN: 1410 mL / OUT: 1750 mL / NET: -340 mL            JVP: Normal  Neck: supple  Lung: clear   CV: S1 S2 , Murmur:  Abd: soft  Ext: No edema  Psych: flat affect  Skin: normal``    LABS/DATA:    CARDIAC MARKERS:                                13.3   8.34  )-----------( 106      ( 22 May 2020 05:16 )             40.6     05-22    136  |  106  |  20  ----------------------------<  97  3.9   |  21<L>  |  0.66    Ca    8.7      22 May 2020 05:16      proBNP:   Lipid Profile:   HgA1c:   TSH:     TELE:  EKG:

## 2020-05-23 DIAGNOSIS — R13.10 DYSPHAGIA, UNSPECIFIED: ICD-10-CM

## 2020-05-23 LAB
ANION GAP SERPL CALC-SCNC: 10 MMOL/L — SIGNIFICANT CHANGE UP (ref 5–17)
BUN SERPL-MCNC: 22 MG/DL — SIGNIFICANT CHANGE UP (ref 7–23)
CALCIUM SERPL-MCNC: 8.5 MG/DL — SIGNIFICANT CHANGE UP (ref 8.4–10.5)
CHLORIDE SERPL-SCNC: 106 MMOL/L — SIGNIFICANT CHANGE UP (ref 96–108)
CO2 SERPL-SCNC: 20 MMOL/L — LOW (ref 22–31)
CREAT SERPL-MCNC: 0.62 MG/DL — SIGNIFICANT CHANGE UP (ref 0.5–1.3)
DIGOXIN SERPL-MCNC: 0.9 NG/ML — SIGNIFICANT CHANGE UP (ref 0.8–2)
GLUCOSE SERPL-MCNC: 105 MG/DL — HIGH (ref 70–99)
HCT VFR BLD CALC: 38 % — SIGNIFICANT CHANGE UP (ref 34.5–45)
HGB BLD-MCNC: 12.6 G/DL — SIGNIFICANT CHANGE UP (ref 11.5–15.5)
MCHC RBC-ENTMCNC: 32.1 PG — SIGNIFICANT CHANGE UP (ref 27–34)
MCHC RBC-ENTMCNC: 33.2 GM/DL — SIGNIFICANT CHANGE UP (ref 32–36)
MCV RBC AUTO: 96.9 FL — SIGNIFICANT CHANGE UP (ref 80–100)
NRBC # BLD: 0 /100 WBCS — SIGNIFICANT CHANGE UP (ref 0–0)
PLATELET # BLD AUTO: 164 K/UL — SIGNIFICANT CHANGE UP (ref 150–400)
POTASSIUM SERPL-MCNC: 3.8 MMOL/L — SIGNIFICANT CHANGE UP (ref 3.5–5.3)
POTASSIUM SERPL-SCNC: 3.8 MMOL/L — SIGNIFICANT CHANGE UP (ref 3.5–5.3)
RBC # BLD: 3.92 M/UL — SIGNIFICANT CHANGE UP (ref 3.8–5.2)
RBC # FLD: 13.6 % — SIGNIFICANT CHANGE UP (ref 10.3–14.5)
SODIUM SERPL-SCNC: 136 MMOL/L — SIGNIFICANT CHANGE UP (ref 135–145)
TROPONIN T, HIGH SENSITIVITY RESULT: 38 NG/L — SIGNIFICANT CHANGE UP (ref 0–51)
TROPONIN T, HIGH SENSITIVITY RESULT: 42 NG/L — SIGNIFICANT CHANGE UP (ref 0–51)
WBC # BLD: 8.14 K/UL — SIGNIFICANT CHANGE UP (ref 3.8–10.5)
WBC # FLD AUTO: 8.14 K/UL — SIGNIFICANT CHANGE UP (ref 3.8–10.5)

## 2020-05-23 PROCEDURE — 93010 ELECTROCARDIOGRAM REPORT: CPT

## 2020-05-23 PROCEDURE — 99233 SBSQ HOSP IP/OBS HIGH 50: CPT

## 2020-05-23 RX ORDER — GLUCAGON INJECTION, SOLUTION 0.5 MG/.1ML
3 INJECTION, SOLUTION SUBCUTANEOUS ONCE
Refills: 0 | Status: COMPLETED | OUTPATIENT
Start: 2020-05-23 | End: 2020-05-23

## 2020-05-23 RX ORDER — CEFTRIAXONE 500 MG/1
1000 INJECTION, POWDER, FOR SOLUTION INTRAMUSCULAR; INTRAVENOUS EVERY 24 HOURS
Refills: 0 | Status: DISCONTINUED | OUTPATIENT
Start: 2020-05-23 | End: 2020-05-25

## 2020-05-23 RX ADMIN — Medication 90 MILLIGRAM(S): at 07:31

## 2020-05-23 RX ADMIN — GLUCAGON INJECTION, SOLUTION 3 MILLIGRAM(S): 0.5 INJECTION, SOLUTION SUBCUTANEOUS at 13:52

## 2020-05-23 RX ADMIN — Medication 4 MILLILITER(S): at 11:58

## 2020-05-23 RX ADMIN — Medication 90 MILLIGRAM(S): at 11:59

## 2020-05-23 RX ADMIN — Medication 4 MILLILITER(S): at 17:22

## 2020-05-23 RX ADMIN — LEVETIRACETAM 500 MILLIGRAM(S): 250 TABLET, FILM COATED ORAL at 17:23

## 2020-05-23 RX ADMIN — LEVETIRACETAM 500 MILLIGRAM(S): 250 TABLET, FILM COATED ORAL at 06:00

## 2020-05-23 RX ADMIN — CEFTRIAXONE 100 MILLIGRAM(S): 500 INJECTION, POWDER, FOR SOLUTION INTRAMUSCULAR; INTRAVENOUS at 05:59

## 2020-05-23 RX ADMIN — Medication 125 MICROGRAM(S): at 06:00

## 2020-05-23 RX ADMIN — Medication 90 MILLIGRAM(S): at 19:59

## 2020-05-23 RX ADMIN — ENOXAPARIN SODIUM 40 MILLIGRAM(S): 100 INJECTION SUBCUTANEOUS at 17:23

## 2020-05-23 RX ADMIN — Medication 650 MILLIGRAM(S): at 05:58

## 2020-05-23 RX ADMIN — SENNA PLUS 2 TABLET(S): 8.6 TABLET ORAL at 22:01

## 2020-05-23 RX ADMIN — Medication 2 MILLIGRAM(S): at 22:01

## 2020-05-23 RX ADMIN — Medication 4 MILLILITER(S): at 05:58

## 2020-05-23 NOTE — PROGRESS NOTE ADULT - ASSESSMENT
Aflutter    HR stable  3:1 conduction     On tele there is ST depression suggestive of DIG effect   obtain EKG  check Dig level  check trop

## 2020-05-23 NOTE — PROGRESS NOTE ADULT - SUBJECTIVE AND OBJECTIVE BOX
THE PATIENT WAS SEEN AND EXAMINED BY ME WITH THE HOUSESTAFF AND STROKE TEAM DURING MORNING ROUNDS.   HPI:  87F w/ PMH of HTN, HLD, transferred from [facility] for NSG eval after found to have large L. BG IPH. Patient found to have severe headache with subsequent syncopal episode. Found to be minimally responsive and so initially intubated for airway protection. Was hypertensive,  started on Caredene and prop gtt. Cardene discontinue prior to transfer. Upon arrival, with sedation held she was unable to open her eyes, not following commands, moving spontaneously on the L, RUE loc, RLE WDs. After extensive discussion with the family regarding goals of care, she was brought to the operating room for emergent clot evacuation now extubated.     SUBJECTIVE: No events overnight.  No new neurologic complaints.      acetaminophen   Tablet .. 650 milliGRAM(s) Oral every 6 hours PRN  cefTRIAXone   IVPB 1000 milliGRAM(s) IV Intermittent every 24 hours  digoxin    Elixir 125 MICROGram(s) Oral daily  diltiazem    Tablet 90 milliGRAM(s) Oral every 6 hours  diltiazem Infusion 10 mG/Hr IV Continuous <Continuous>  doxazosin 2 milliGRAM(s) Oral at bedtime  enoxaparin Injectable 40 milliGRAM(s) SubCutaneous <User Schedule>  levETIRAcetam  Solution 500 milliGRAM(s) Oral two times a day  nystatin    Suspension 4 milliLiter(s) Oral four times a day  ondansetron Injectable 4 milliGRAM(s) IV Push every 6 hours PRN  polyethylene glycol 3350 17 Gram(s) Oral every 12 hours  propranolol 10 milliGRAM(s) Oral three times a day  senna 2 Tablet(s) Oral at bedtime  sodium chloride 3%  Inhalation 3 milliLiter(s) Inhalation every 6 hours PRN      PHYSICAL EXAM:   Vital Signs Last 24 Hrs  T(C): 37.1 (22 May 2020 20:00), Max: 37.1 (22 May 2020 20:00)  T(F): 98.7 (22 May 2020 20:00), Max: 98.7 (22 May 2020 20:00)  HR: 68 (23 May 2020 07:00) (68 - 107)  BP: 112/64 (23 May 2020 07:00) (88/57 - 144/59)  BP(mean): 77 (23 May 2020 07:00) (66 - 88)  RR: 18 (23 May 2020 07:00) (12 - 19)  SpO2: 98% (23 May 2020 07:00) (95% - 100%)    General: No acute distress  HEENT: eyes open, right gaze palsy , R HHA  Abdomen: Soft, nontender, nondistended   Extremities: No edema    NEUROLOGICAL EXAM:  Mental status: Awake, eyes open and somewhat minimally attentive, not following commands, some unintelligible speech   Cranial Nerves: right facial droop, right gaze palsy, right homonymous   Motor exam: left arm moves spontaneously against gravity within bed, LLE with some flexion in bed, right hemiplegia RUE/RLE 0/5.  Sensation: decreased on right   Coordination/ Gait: gait not assessed    LABS:                        12.6   8.14  )-----------( 164      ( 23 May 2020 05:21 )             38.0    05-23    136  |  106  |  22  ----------------------------<  105<H>  3.8   |  20<L>  |  0.62    Ca    8.5      23 May 2020 05:21    IMAGING: Reviewed by me.     CT Head No Cont (05.11.20)  No change in moderate residual parenchymal hemorrhage in the left frontal parenchyma and basal ganglia compared with 5/10/2020. No change in mass effect.    (05.09.20)  NONCONTRAST CT BRAIN: Large intraparenchymal hemorrhage involving the left basal ganglia, frontal lobe and medial temporal lobe as described above. Near complete effacement of the left lateral ventricle. There is 0.4 cm of rightward midline shift.    CTA BRAIN AND NECK: Patent cervical circulation.  No identified aneurysm or AVM. Decreased size of the left middle cerebral artery may be due to distal branch occlusion or compression by the intraparenchymal hemorrhage.

## 2020-05-23 NOTE — PROGRESS NOTE ADULT - PROBLEM SELECTOR PLAN 1
s/p PEG   feeds initiated today  aspiration precautions  daily peg care  monitor residuals  monitor GI function

## 2020-05-23 NOTE — PROGRESS NOTE ADULT - SUBJECTIVE AND OBJECTIVE BOX
INTERVAL HPI/OVERNIGHT EVENTS:    s/p PEG  feeds started today       MEDICATIONS  (STANDING):  cefTRIAXone   IVPB 1000 milliGRAM(s) IV Intermittent every 24 hours  diltiazem    Tablet 90 milliGRAM(s) Oral every 6 hours  doxazosin 2 milliGRAM(s) Oral at bedtime  enoxaparin Injectable 40 milliGRAM(s) SubCutaneous <User Schedule>  glucagon  Injectable 3 milliGRAM(s) IV Push once  levETIRAcetam  Solution 500 milliGRAM(s) Oral two times a day  nystatin    Suspension 4 milliLiter(s) Oral four times a day  polyethylene glycol 3350 17 Gram(s) Oral every 12 hours  senna 2 Tablet(s) Oral at bedtime    MEDICATIONS  (PRN):  acetaminophen   Tablet .. 650 milliGRAM(s) Oral every 6 hours PRN Temp greater or equal to 38C (100.4F), Mild Pain (1 - 3), Moderate Pain (4 - 6), Severe Pain (7 - 10)  ondansetron Injectable 4 milliGRAM(s) IV Push every 6 hours PRN Nausea and/or Vomiting  sodium chloride 3%  Inhalation 3 milliLiter(s) Inhalation every 6 hours PRN secretions      Allergies    No Known Allergies    Intolerances        Review of Systems: unable to obtain         Vital Signs Last 24 Hrs  T(C): 36.6 (23 May 2020 08:00), Max: 37.1 (22 May 2020 20:00)  T(F): 97.8 (23 May 2020 08:00), Max: 98.7 (22 May 2020 20:00)  HR: 69 (23 May 2020 10:00) (63 - 107)  BP: 106/57 (23 May 2020 10:00) (88/57 - 144/59)  BP(mean): 69 (23 May 2020 10:00) (67 - 88)  RR: 13 (23 May 2020 10:00) (13 - 19)  SpO2: 96% (23 May 2020 10:00) (95% - 100%)    PHYSICAL EXAM:    Constitutional: NAD  HEENT: ncat   Cardiovascular: S1 and S2, RRR, no M  Gastrointestinal: soft, NT/ND, +PEG   Extremities: No peripheral edema  Neurological: aphasic         LABS:                        12.6   8.14  )-----------( 164      ( 23 May 2020 05:21 )             38.0     05-23    136  |  106  |  22  ----------------------------<  105<H>  3.8   |  20<L>  |  0.62    Ca    8.5      23 May 2020 05:21        Urinalysis Basic - ( 22 May 2020 07:17 )    Color: Yellow / Appearance: Slightly Turbid / S.011 / pH: x  Gluc: x / Ketone: Negative  / Bili: Negative / Urobili: Negative   Blood: x / Protein: 100 mg/dL / Nitrite: Negative   Leuk Esterase: Large / RBC: 5 /hpf / WBC >50   Sq Epi: x / Non Sq Epi: Few / Bacteria: Many        RADIOLOGY & ADDITIONAL TESTS:

## 2020-05-23 NOTE — PROGRESS NOTE ADULT - ASSESSMENT
87F with PMHx of HTN, HLD, presented with large left frontal/basal ganglia IPH, etiology unknown, possibility of underlying hypertension S/P larry clot removal. Now with PAF/A flutter.    NEURO: neurologically without acute change, continue close monitoring for neurologic deterioration as patient with cerebral edema, mass effect, and brain compression, she remains bedbound with immobility due to frailty and functional quadriplegia, maintain normotension, home statin therapy if applicable, MR brain upon resolution of hemorrhage for further evaluation, PMR: GINA.    ANTITHROMBOTIC THERAPY: none at this time in setting of ICH and increased risk for bleeding, pending outpatient follow up- 4 to 6 weeks, repeat CT Head before starting antithrombotic Rx, clinical course and radiological stability will need to determine timeline for initiation of AC vs. Watchman in setting of atrial fibrillation.     PULMONARY: CXR on 5/11 shows enlarged heart, lungs clear, protecting airway, saturating well     CARDIOVASCULAR:  TTE: ef 65-70%, moderate AS, mild AR, LV remodeling, moderate stage II diastolic dysfunction borderline pulmonary htn , cardiac monitoring to ensure rate control, regimen as recommended by Dr. Nix.      SBP goal: <140mMHg    GASTROINTESTINAL:  dysphagia screen failed, NGT removed this AM. SLP evaluated patient on 5/20, recommend MBS, failed MBS, GI consulted: s/p PEG on 5/22- started on TF. Nutrition consulted.     Diet: NPO with TF via PEG    RENAL: BUN/Cr without acute change, good urine output      Na Goal: Greater than 135     Garcia: n    HEMATOLOGY: H/H without acute change, no active bleeding, Platelets 164     DVT ppx: Heparin s.c [] LMWH [x]     ID: afebrile, no leukocytosis, UA showed UTI- started on ceftriaxone. UCx pending.    Other: Called sonShahid ([phone]) and updated him on plan and answered all questions.      DISPOSITION: GINA once tolerating tube feedings and bed available.     CORE MEASURES:        Admission NIHSS:      TPA: [] YES [x] NO      LDL/HDL:100/50     Depression Screen: NA     Statin Therapy: as noted     Dysphagia Screen: [] PASS [x] FAIL     Smoking [] YES [x] NO      Afib [x] YES [] NO     Stroke Education [x] YES [] NO    Obtain screening lower extremity venous ultrasound in patients who meet 1 or more of the following criteria as patient is high risk for DVT/PE on admission:   [] History of DVT/PE  []Hypercoagulable states (Factor V Leiden, Cancer, OCP, etc. )  []Prolonged immobility (hemiplegia/hemiparesis/post operative or any other extended immobilization)  [] Transferred from outside facility (Rehab or Long term care)  [] Age </= to 50 87F with PMHx of HTN, HLD, presented with large left frontal/basal ganglia IPH, etiology unknown, possibility of underlying hypertension S/P larry clot removal. Now with PAF/A flutter.    NEURO: neurologically without acute change, continue close monitoring for neurologic deterioration as patient with cerebral edema, mass effect, and brain compression, she remains bedbound with immobility due to frailty and functional quadriplegia, maintain normotension, home statin therapy if applicable, MR brain upon resolution of hemorrhage for further evaluation, PMR: GINA.    ANTITHROMBOTIC THERAPY: none at this time in setting of ICH and increased risk for bleeding, pending outpatient follow up- 4 to 6 weeks, repeat CT Head before starting antithrombotic Rx, clinical course and radiological stability will need to determine timeline for initiation of AC vs. Watchman in setting of atrial fibrillation.     PULMONARY: CXR on 5/11 shows enlarged heart, lungs clear, protecting airway, saturating well     CARDIOVASCULAR:  TTE: ef 65-70%, moderate AS, mild AR, LV remodeling, moderate stage II diastolic dysfunction borderline pulmonary htn , cardiac monitoring: showed episodes of bradycardia and pauses 3-4 seconds, greatest 5.6 second conversion pause, STAT EKG showed ST depression suggestive of digoxin effect, dig level 0.8, digoxin discontinued, troponin neg x2. Given glucagon. Will reintroduce rate control medication slowly. EP at bedside during episode and agreed with plan. Cardiology consulted with Dr. Boyd velázquez.       SBP goal: <140mMHg    GASTROINTESTINAL:  dysphagia screen failed, NGT placed during interim. SLP evaluated patient on 5/20, recommend MBS, failed MBS, GI consulted: s/p PEG on 5/22- started on TF. Nutrition consulted.     Diet: NPO with TF via PEG    RENAL: BUN/Cr without acute change, good urine output      Na Goal: Greater than 135     Garcia: n    HEMATOLOGY: H/H without acute change, no active bleeding, Platelets 164     DVT ppx: Heparin s.c [] LMWH [x]     ID: afebrile, no leukocytosis, UA showed UTI- started on ceftriaxone. UCx pending.    Other: Called son, Shahid Alberto ([phone]) and updated him on plan and answered all questions, at length.      DISPOSITION: GINA once tolerating tube feedings and bed available.     CORE MEASURES:        Admission NIHSS:      TPA: [] YES [x] NO      LDL/HDL:100/50     Depression Screen: NA     Statin Therapy: as noted     Dysphagia Screen: [] PASS [x] FAIL     Smoking [] YES [x] NO      Afib [x] YES [] NO     Stroke Education [x] YES [] NO    Obtain screening lower extremity venous ultrasound in patients who meet 1 or more of the following criteria as patient is high risk for DVT/PE on admission:   [] History of DVT/PE  []Hypercoagulable states (Factor V Leiden, Cancer, OCP, etc. )  []Prolonged immobility (hemiplegia/hemiparesis/post operative or any other extended immobilization)  [] Transferred from outside facility (Rehab or Long term care)  [] Age </= to 50

## 2020-05-23 NOTE — PROGRESS NOTE ADULT - SUBJECTIVE AND OBJECTIVE BOX
Subjective: Patient seen and examined. No new events except as noted.     SUBJECTIVE/ROS:        MEDICATIONS:  MEDICATIONS  (STANDING):  cefTRIAXone   IVPB 1000 milliGRAM(s) IV Intermittent every 24 hours  digoxin    Elixir 125 MICROGram(s) Oral daily  diltiazem    Tablet 90 milliGRAM(s) Oral every 6 hours  diltiazem Infusion 10 mG/Hr (10 mL/Hr) IV Continuous <Continuous>  doxazosin 2 milliGRAM(s) Oral at bedtime  enoxaparin Injectable 40 milliGRAM(s) SubCutaneous <User Schedule>  levETIRAcetam  Solution 500 milliGRAM(s) Oral two times a day  nystatin    Suspension 4 milliLiter(s) Oral four times a day  polyethylene glycol 3350 17 Gram(s) Oral every 12 hours  propranolol 10 milliGRAM(s) Oral three times a day  senna 2 Tablet(s) Oral at bedtime      PHYSICAL EXAM:  T(C): 37.1 (05-22-20 @ 20:00), Max: 37.1 (05-22-20 @ 20:00)  HR: 71 (05-23-20 @ 03:00) (70 - 107)  BP: 110/61 (05-23-20 @ 03:00) (88/57 - 144/59)  RR: 13 (05-23-20 @ 03:00) (12 - 19)  SpO2: 96% (05-23-20 @ 03:00) (95% - 100%)  Wt(kg): --  I&O's Summary    21 May 2020 07:01  -  22 May 2020 07:00  --------------------------------------------------------  IN: 1410 mL / OUT: 1750 mL / NET: -340 mL            JVP: Normal  Neck: supple  Lung: clear   CV: S1 S2 , Murmur:  Abd: soft  Ext: No edema  neuro: Awake / alert  Psych: flat affect  Skin: normal``    LABS/DATA:    CARDIAC MARKERS:                                12.6   8.14  )-----------( 164      ( 23 May 2020 05:21 )             38.0     05-23    136  |  106  |  22  ----------------------------<  105<H>  3.8   |  20<L>  |  0.62    Ca    8.5      23 May 2020 05:21      proBNP:   Lipid Profile:   HgA1c:   TSH:     TELE:  EKG:

## 2020-05-23 NOTE — CHART NOTE - NSCHARTNOTEFT_GEN_A_CORE
Nutrition Follow Up Note  Patient seen for nutrition consult for tube feedings.    Source: team; EMR. Pt is nonverbal    Pt is a 70 yo F with PMH: HTN, hyperlipidemia. Presented to OSH 5/9/20 with headache and syncopal episode. Imaging revealed a large, left basilar ganglia hemorrhage and transferred to [facility] for further care. Pt s/p left larry clot evacuation on 5/9; extubated 5/11. Pt non-verbal, aphasic.     Interim Events: Pt now s/p PEG 5/22    Enteral /Parenteral Nutrition:   Jevity 1.2 @ 60mL/hr x 24 hours   Provides: 1440mL total volume, 1728kcal, 80g protein and 1162mL free water (21kcal/kg based on dosing wt 82.2kg; 1.4g protein/kg based on IBW 58.9kg)  - Observed Jevity 1.2 hanging and running at 40mL thus far  - Noted pt was previously tolerating regimen of Jevity 1.2@ 75mL/hr x 18 hours  - BG are well controlled    GI: Pt with 1 BM 5/22, 1 BM 5/21, no s/s of intolerance noted    Pertinent Medications: MEDICATIONS  (STANDING):  cefTRIAXone   IVPB 1000 milliGRAM(s) IV Intermittent every 24 hours  diltiazem    Tablet 90 milliGRAM(s) Oral every 6 hours  doxazosin 2 milliGRAM(s) Oral at bedtime  enoxaparin Injectable 40 milliGRAM(s) SubCutaneous <User Schedule>  levETIRAcetam  Solution 500 milliGRAM(s) Oral two times a day  nystatin    Suspension 4 milliLiter(s) Oral four times a day  polyethylene glycol 3350 17 Gram(s) Oral every 12 hours  senna 2 Tablet(s) Oral at bedtime    MEDICATIONS  (PRN):  acetaminophen   Tablet .. 650 milliGRAM(s) Oral every 6 hours PRN Temp greater or equal to 38C (100.4F), Mild Pain (1 - 3), Moderate Pain (4 - 6), Severe Pain (7 - 10)  ondansetron Injectable 4 milliGRAM(s) IV Push every 6 hours PRN Nausea and/or Vomiting  sodium chloride 3%  Inhalation 3 milliLiter(s) Inhalation every 6 hours PRN secretions    Pertinent Labs: 05-23 @ 05:21: Na 136, BUN 22, Cr 0.62, <H>, K+ 3.8, Phos --, Mg --, Alk Phos --, ALT/SGPT --, AST/SGOT --, HbA1c --    Skin per nursing documentation: no pressure injuries  Edema: 2+ R hand    Estimated Needs:   [x] no change since previous assessment  Estimated Needs:  Energy: (20-25kcal/kg dosing wt 82.2kg): 2323-3288 kcal  Protein: (1.4-1.6g protein/kg IBW 58.9kg): 82-94g protein     Previous Nutrition Diagnosis: increased nutrient needs  Nutrition Diagnosis is: ongoing, addressed with EN    New Nutrition Diagnosis: none at this time    Recommend  1) As current regimen meets pt's estimated needs, recommend continue Jevity 1.2 @ 60mL/hr x 24 hours   -Provides: 1440mL total volume, 1728kcal, 80g protein and 1162mL free water (21kcal/kg based on dosing wt 82.2kg; 1.4g protein/kg based on IBW 58.9kg)  2) Monitor GI tolerance to feeds    Monitoring and Evaluation:   Continue to monitor Nutritional intake, Tolerance to diet prescription, weights, labs, skin integrity    Gabrielle Burroughs RD, CDN  Pager [phone]  RD remains available upon request and will follow up per protocol

## 2020-05-24 LAB
ANION GAP SERPL CALC-SCNC: 9 MMOL/L — SIGNIFICANT CHANGE UP (ref 5–17)
BUN SERPL-MCNC: 24 MG/DL — HIGH (ref 7–23)
CALCIUM SERPL-MCNC: 8.3 MG/DL — LOW (ref 8.4–10.5)
CHLORIDE SERPL-SCNC: 102 MMOL/L — SIGNIFICANT CHANGE UP (ref 96–108)
CO2 SERPL-SCNC: 22 MMOL/L — SIGNIFICANT CHANGE UP (ref 22–31)
CREAT SERPL-MCNC: 0.69 MG/DL — SIGNIFICANT CHANGE UP (ref 0.5–1.3)
GLUCOSE SERPL-MCNC: 130 MG/DL — HIGH (ref 70–99)
HCT VFR BLD CALC: 35.2 % — SIGNIFICANT CHANGE UP (ref 34.5–45)
HGB BLD-MCNC: 11.6 G/DL — SIGNIFICANT CHANGE UP (ref 11.5–15.5)
MCHC RBC-ENTMCNC: 31.5 PG — SIGNIFICANT CHANGE UP (ref 27–34)
MCHC RBC-ENTMCNC: 33 GM/DL — SIGNIFICANT CHANGE UP (ref 32–36)
MCV RBC AUTO: 95.7 FL — SIGNIFICANT CHANGE UP (ref 80–100)
NRBC # BLD: 0 /100 WBCS — SIGNIFICANT CHANGE UP (ref 0–0)
PLATELET # BLD AUTO: 186 K/UL — SIGNIFICANT CHANGE UP (ref 150–400)
POTASSIUM SERPL-MCNC: 3.4 MMOL/L — LOW (ref 3.5–5.3)
POTASSIUM SERPL-SCNC: 3.4 MMOL/L — LOW (ref 3.5–5.3)
RBC # BLD: 3.68 M/UL — LOW (ref 3.8–5.2)
RBC # FLD: 13.3 % — SIGNIFICANT CHANGE UP (ref 10.3–14.5)
SODIUM SERPL-SCNC: 133 MMOL/L — LOW (ref 135–145)
WBC # BLD: 7.69 K/UL — SIGNIFICANT CHANGE UP (ref 3.8–10.5)
WBC # FLD AUTO: 7.69 K/UL — SIGNIFICANT CHANGE UP (ref 3.8–10.5)

## 2020-05-24 PROCEDURE — 99233 SBSQ HOSP IP/OBS HIGH 50: CPT

## 2020-05-24 RX ORDER — POTASSIUM CHLORIDE 20 MEQ
40 PACKET (EA) ORAL EVERY 4 HOURS
Refills: 0 | Status: COMPLETED | OUTPATIENT
Start: 2020-05-24 | End: 2020-05-24

## 2020-05-24 RX ORDER — SODIUM CHLORIDE 9 MG/ML
1000 INJECTION INTRAMUSCULAR; INTRAVENOUS; SUBCUTANEOUS ONCE
Refills: 0 | Status: COMPLETED | OUTPATIENT
Start: 2020-05-24 | End: 2020-05-24

## 2020-05-24 RX ADMIN — Medication 40 MILLIEQUIVALENT(S): at 09:20

## 2020-05-24 RX ADMIN — CEFTRIAXONE 100 MILLIGRAM(S): 500 INJECTION, POWDER, FOR SOLUTION INTRAMUSCULAR; INTRAVENOUS at 05:32

## 2020-05-24 RX ADMIN — Medication 90 MILLIGRAM(S): at 01:58

## 2020-05-24 RX ADMIN — Medication 40 MILLIEQUIVALENT(S): at 07:52

## 2020-05-24 RX ADMIN — Medication 4 MILLILITER(S): at 23:00

## 2020-05-24 RX ADMIN — Medication 4 MILLILITER(S): at 05:32

## 2020-05-24 RX ADMIN — SODIUM CHLORIDE 1000 MILLILITER(S): 9 INJECTION INTRAMUSCULAR; INTRAVENOUS; SUBCUTANEOUS at 10:41

## 2020-05-24 RX ADMIN — Medication 2 MILLIGRAM(S): at 23:00

## 2020-05-24 RX ADMIN — ENOXAPARIN SODIUM 40 MILLIGRAM(S): 100 INJECTION SUBCUTANEOUS at 17:12

## 2020-05-24 RX ADMIN — Medication 90 MILLIGRAM(S): at 18:40

## 2020-05-24 RX ADMIN — Medication 90 MILLIGRAM(S): at 23:01

## 2020-05-24 RX ADMIN — LEVETIRACETAM 500 MILLIGRAM(S): 250 TABLET, FILM COATED ORAL at 05:32

## 2020-05-24 RX ADMIN — Medication 4 MILLILITER(S): at 01:58

## 2020-05-24 RX ADMIN — Medication 4 MILLILITER(S): at 11:23

## 2020-05-24 RX ADMIN — Medication 90 MILLIGRAM(S): at 14:23

## 2020-05-24 RX ADMIN — Medication 90 MILLIGRAM(S): at 07:52

## 2020-05-24 RX ADMIN — Medication 4 MILLILITER(S): at 17:12

## 2020-05-24 RX ADMIN — SODIUM CHLORIDE 1000 MILLILITER(S): 9 INJECTION INTRAMUSCULAR; INTRAVENOUS; SUBCUTANEOUS at 23:22

## 2020-05-24 RX ADMIN — LEVETIRACETAM 500 MILLIGRAM(S): 250 TABLET, FILM COATED ORAL at 17:13

## 2020-05-24 NOTE — PROGRESS NOTE ADULT - SUBJECTIVE AND OBJECTIVE BOX
Subjective: Patient seen and examined. No new events except as noted.     SUBJECTIVE/ROS:        MEDICATIONS:  MEDICATIONS  (STANDING):  cefTRIAXone   IVPB 1000 milliGRAM(s) IV Intermittent every 24 hours  diltiazem    Tablet 90 milliGRAM(s) Oral every 6 hours  doxazosin 2 milliGRAM(s) Oral at bedtime  enoxaparin Injectable 40 milliGRAM(s) SubCutaneous <User Schedule>  levETIRAcetam  Solution 500 milliGRAM(s) Oral two times a day  nystatin    Suspension 4 milliLiter(s) Oral four times a day  polyethylene glycol 3350 17 Gram(s) Oral every 12 hours  senna 2 Tablet(s) Oral at bedtime      PHYSICAL EXAM:  T(C): 37.1 (05-23-20 @ 20:00), Max: 37.1 (05-23-20 @ 20:00)  HR: 68 (05-24-20 @ 06:00) (51 - 118)  BP: 107/62 (05-24-20 @ 06:00) (100/53 - 123/55)  RR: 14 (05-24-20 @ 06:00) (12 - 19)  SpO2: 98% (05-24-20 @ 06:00) (96% - 99%)  Wt(kg): --  I&O's Summary    23 May 2020 07:01  -  24 May 2020 06:59  --------------------------------------------------------  IN: 1030 mL / OUT: 750 mL / NET: 280 mL            JVP: Normal  Neck: supple  Lung: clear   CV: S1 S2 , Murmur:  Abd: soft  Ext: No edema  Psych: flat affect  Skin: normal``    LABS/DATA:    CARDIAC MARKERS:                                11.6   7.69  )-----------( 186      ( 24 May 2020 05:27 )             35.2     05-24    133<L>  |  102  |  24<H>  ----------------------------<  130<H>  3.4<L>   |  22  |  0.69    Ca    8.3<L>      24 May 2020 05:27      proBNP:   Lipid Profile:   HgA1c:   TSH:     TELE:  EKG:

## 2020-05-24 NOTE — PROGRESS NOTE ADULT - ASSESSMENT
87F with PMHx of HTN, HLD, presented with large left frontal/basal ganglia IPH, etiology unknown, possibility of underlying hypertension S/P larry clot removal. Now with PAF/A flutter.    NEURO: neurologically without acute change, continue close monitoring for neurologic deterioration as patient with cerebral edema, mass effect, and brain compression, she remains bedbound with immobility due to frailty and functional quadriplegia, maintain normotension, home statin therapy if applicable, MR brain upon resolution of hemorrhage for further evaluation, PMR: GINA.    ANTITHROMBOTIC THERAPY: none at this time in setting of ICH and increased risk for bleeding, pending outpatient follow up- 4 to 6 weeks, repeat CT Head before starting antithrombotic Rx, clinical course and radiological stability will need to determine timeline for initiation of AC vs. Watchman in setting of atrial fibrillation.     PULMONARY: CXR on 5/11 shows enlarged heart, lungs clear, protecting airway, saturating well     CARDIOVASCULAR:  TTE: ef 65-70%, moderate AS, mild AR, LV remodeling, moderate stage II diastolic dysfunction borderline pulmonary htn , cardiac monitoring: showed episodes of bradycardia and pauses 3-4 seconds, greatest 5.6 second conversion pause, STAT EKG showed ST depression suggestive of digoxin effect, dig level 0.8, digoxin discontinued, troponin neg x2. Given glucagon. Will reintroduce rate control medication slowly. EP at bedside during episode and agreed with plan. Cardiology consulted with Dr. Boyd velázquez.       SBP goal: <140mMHg    GASTROINTESTINAL:  dysphagia screen failed, NGT placed during interim. SLP evaluated patient on 5/20, recommend MBS, failed MBS, GI consulted: s/p PEG on 5/22- tolerating TFs. Nutrition consulted.     Diet: NPO with TF via PEG    RENAL: BUN/Cr without acute change, good urine output, hypokalemic s/p supplementation      Na Goal: Greater than 135     Garcia: n    HEMATOLOGY: H/H without acute change, no active bleeding, Platelets 186     DVT ppx: Heparin s.c [] LMWH [x]     ID: afebrile, no leukocytosis, UA showed UTI- started on ceftriaxone. UCx pending.    Other: Called son, Shahid Alberto ([phone]) and updated him on plan and answered all questions, at length.      DISPOSITION: GINA once tolerating tube feedings and bed available.     CORE MEASURES:        Admission NIHSS:      TPA: [] YES [x] NO      LDL/HDL:100/50     Depression Screen: NA     Statin Therapy: as noted     Dysphagia Screen: [] PASS [x] FAIL     Smoking [] YES [x] NO      Afib [x] YES [] NO     Stroke Education [x] YES [] NO    Obtain screening lower extremity venous ultrasound in patients who meet 1 or more of the following criteria as patient is high risk for DVT/PE on admission:   [] History of DVT/PE  []Hypercoagulable states (Factor V Leiden, Cancer, OCP, etc. )  []Prolonged immobility (hemiplegia/hemiparesis/post operative or any other extended immobilization)  [] Transferred from outside facility (Rehab or Long term care)  [] Age </= to 50 87F with PMHx of HTN, HLD, presented with large left frontal/basal ganglia IPH, etiology unknown, possibility of underlying hypertension S/P larry clot removal. Now with PAF/A flutter.     Impression: Left frontal/basal ganglia IPH likely due to chronic HTN    NEURO: neurologically without acute change, continue close monitoring for neurologic deterioration as patient with cerebral edema, mass effect, and brain compression, she remains bedbound with immobility due to frailty and functional quadriplegia, maintain normotension, home statin therapy if applicable, MR brain upon resolution of hemorrhage for further evaluation, PMR: GINA.    ANTITHROMBOTIC THERAPY: none at this time in setting of ICH and increased risk for bleeding, pending outpatient follow up- 4 to 6 weeks, repeat CT Head before starting antithrombotic Rx, clinical course and radiological stability will need to determine timeline for initiation of AC vs. Watchman in setting of atrial fibrillation.     PULMONARY: CXR on 5/11 shows enlarged heart, lungs clear, protecting airway, saturating well     CARDIOVASCULAR:  TTE: ef 65-70%, moderate AS, mild AR, LV remodeling, moderate stage II diastolic dysfunction borderline pulmonary htn , cardiac monitoring on 05/24: showed episodes of bradycardia and pauses 3-4 seconds, greatest 5.6 second conversion pause, STAT EKG showed ST depression suggestive of digoxin effect, dig level 0.8, digoxin discontinued, troponin neg x2. Given glucagon. Will reintroduce rate control medication slowly. EP at bedside during episode and agreed with plan. Cardiology consulted with Dr. Boyd velázquez.       SBP goal: <140mMHg    GASTROINTESTINAL:  dysphagia screen failed, NGT placed during interim. SLP evaluated patient on 5/20, recommend MBS, failed MBS, GI consulted: s/p PEG on 5/22- tolerating TFs. Nutrition consulted.     Diet: NPO with TF via PEG    RENAL: BUN/Cr without acute change, good urine output, hypokalemic s/p supplementation      Na Goal: Greater than 135     Garcia: n    HEMATOLOGY: H/H without acute change, no active bleeding, Platelets 186     DVT ppx: Heparin s.c [] LMWH [x]     ID: afebrile, no leukocytosis, UA showed UTI- started on ceftriaxone. UCx pending.    Other: Called son, Shahid Alberto ([phone]) and updated him on plan and answered all questions, at length.      DISPOSITION: GINA once tolerating tube feedings and bed available.     CORE MEASURES:        Admission NIHSS:      TPA: [] YES [x] NO      LDL/HDL:100/50     Depression Screen: NA     Statin Therapy: as noted     Dysphagia Screen: [] PASS [x] FAIL     Smoking [] YES [x] NO      Afib [x] YES [] NO     Stroke Education [x] YES [] NO    Obtain screening lower extremity venous ultrasound in patients who meet 1 or more of the following criteria as patient is high risk for DVT/PE on admission:   [] History of DVT/PE  []Hypercoagulable states (Factor V Leiden, Cancer, OCP, etc. )  []Prolonged immobility (hemiplegia/hemiparesis/post operative or any other extended immobilization)  [] Transferred from outside facility (Rehab or Long term care)  [] Age </= to 50

## 2020-05-24 NOTE — PROGRESS NOTE ADULT - ASSESSMENT
Aflutter   yesterday with episodes of significant pauses  off dig   off propranolol   cont cardizem   monitor HR , if further tachy / froylan then fu with EP to consider PPM

## 2020-05-24 NOTE — PROGRESS NOTE ADULT - SUBJECTIVE AND OBJECTIVE BOX
THE PATIENT WAS SEEN AND EXAMINED BY ME WITH THE HOUSESTAFF AND STROKE TEAM DURING MORNING ROUNDS.   HPI:  87F w/ PMH of HTN, HLD, transferred from [facility] for NSG eval after found to have large L. BG IPH. Patient found to have severe headache with subsequent syncopal episode. Found to be minimally responsive and so initially intubated for airway protection. Was hypertensive,  started on Caredene and prop gtt. Cardene discontinue prior to transfer. Upon arrival, with sedation held she was unable to open her eyes, not following commands, moving spontaneously on the L, RUE loc, RLE WDs. After extensive discussion with the family regarding goals of care, she was brought to the operating room for emergent clot evacuation now extubated.     SUBJECTIVE: No events overnight.  No new neurologic complaints.      acetaminophen   Tablet .. 650 milliGRAM(s) Oral every 6 hours PRN  cefTRIAXone   IVPB 1000 milliGRAM(s) IV Intermittent every 24 hours  diltiazem    Tablet 90 milliGRAM(s) Oral every 6 hours  doxazosin 2 milliGRAM(s) Oral at bedtime  enoxaparin Injectable 40 milliGRAM(s) SubCutaneous <User Schedule>  levETIRAcetam  Solution 500 milliGRAM(s) Oral two times a day  nystatin    Suspension 4 milliLiter(s) Oral four times a day  ondansetron Injectable 4 milliGRAM(s) IV Push every 6 hours PRN  polyethylene glycol 3350 17 Gram(s) Oral every 12 hours  senna 2 Tablet(s) Oral at bedtime  sodium chloride 3%  Inhalation 3 milliLiter(s) Inhalation every 6 hours PRN      PHYSICAL EXAM:   Vital Signs Last 24 Hrs  T(C): 37.1 (23 May 2020 20:00), Max: 37.1 (23 May 2020 20:00)  T(F): 98.7 (23 May 2020 20:00), Max: 98.7 (23 May 2020 20:00)  HR: 68 (24 May 2020 06:00) (51 - 118)  BP: 107/62 (24 May 2020 06:00) (100/53 - 123/55)  BP(mean): 73 (24 May 2020 06:00) (63 - 81)  RR: 14 (24 May 2020 06:00) (12 - 19)  SpO2: 98% (24 May 2020 06:00) (96% - 99%)    General: No acute distress  HEENT: eyes open, right gaze palsy , R HHA  Abdomen: Soft, nontender, nondistended   Extremities: No edema    NEUROLOGICAL EXAM:  Mental status: Awake, eyes open and somewhat minimally attentive, not following commands, some unintelligible speech   Cranial Nerves: right facial droop, right gaze palsy, right homonymous   Motor exam: left arm moves spontaneously against gravity within bed, LLE with some flexion in bed, right hemiplegia RUE/RLE 0/5.  Sensation: decreased on right   Coordination/ Gait: gait not assessed    LABS:                        11.6   7.69  )-----------( 186      ( 24 May 2020 05:27 )             35.2    05-24    133<L>  |  102  |  24<H>  ----------------------------<  130<H>  3.4<L>   |  22  |  0.69    Ca    8.3<L>      24 May 2020 05:27          IMAGING: Reviewed by me.       CT Head No Cont (05.11.20)  No change in moderate residual parenchymal hemorrhage in the left frontal parenchyma and basal ganglia compared with 5/10/2020. No change in mass effect.    (05.09.20)  NONCONTRAST CT BRAIN: Large intraparenchymal hemorrhage involving the left basal ganglia, frontal lobe and medial temporal lobe as described above. Near complete effacement of the left lateral ventricle. There is 0.4 cm of rightward midline shift.    CTA BRAIN AND NECK: Patent cervical circulation.  No identified aneurysm or AVM. Decreased size of the left middle cerebral artery may be due to distal branch occlusion or compression by the intraparenchymal hemorrhage. THE PATIENT WAS SEEN AND EXAMINED BY ME WITH THE HOUSESTAFF AND STROKE TEAM DURING MORNING ROUNDS.   HPI:  87F w/ PMH of HTN, HLD, transferred from [facility] for NSG eval after found to have large L. BG IPH. Patient found to have severe headache with subsequent syncopal episode. Found to be minimally responsive and so initially intubated for airway protection. Was hypertensive,  started on Caredene and prop gtt. Cardene discontinue prior to transfer. Upon arrival, with sedation held she was unable to open her eyes, not following commands, moving spontaneously on the L, RUE loc, RLE WDs. After extensive discussion with the family regarding goals of care, she was brought to the operating room for emergent clot evacuation now extubated.     SUBJECTIVE: No events overnight.  No new neurologic complaints.      acetaminophen   Tablet .. 650 milliGRAM(s) Oral every 6 hours PRN  cefTRIAXone   IVPB 1000 milliGRAM(s) IV Intermittent every 24 hours  diltiazem    Tablet 90 milliGRAM(s) Oral every 6 hours  doxazosin 2 milliGRAM(s) Oral at bedtime  enoxaparin Injectable 40 milliGRAM(s) SubCutaneous <User Schedule>  levETIRAcetam  Solution 500 milliGRAM(s) Oral two times a day  nystatin    Suspension 4 milliLiter(s) Oral four times a day  ondansetron Injectable 4 milliGRAM(s) IV Push every 6 hours PRN  polyethylene glycol 3350 17 Gram(s) Oral every 12 hours  senna 2 Tablet(s) Oral at bedtime  sodium chloride 3%  Inhalation 3 milliLiter(s) Inhalation every 6 hours PRN      PHYSICAL EXAM:   Vital Signs Last 24 Hrs  T(C): 37.1 (23 May 2020 20:00), Max: 37.1 (23 May 2020 20:00)  T(F): 98.7 (23 May 2020 20:00), Max: 98.7 (23 May 2020 20:00)  HR: 68 (24 May 2020 06:00) (51 - 118)  BP: 107/62 (24 May 2020 06:00) (100/53 - 123/55)  BP(mean): 73 (24 May 2020 06:00) (63 - 81)  RR: 14 (24 May 2020 06:00) (12 - 19)  SpO2: 98% (24 May 2020 06:00) (96% - 99%)    General: No acute distress  HEENT: eyes open, right gaze palsy , R HHA  Abdomen: Soft, nontender, nondistended   Extremities: No edema    NEUROLOGICAL EXAM:  Mental status: Awake, eyes open and somewhat minimally attentive, not following commands, some unintelligible speech   Cranial Nerves: right facial droop, right gaze palsy, right homonymous   Motor exam: left arm moves spontaneously against gravity within bed, LLE with some flexion in bed, right hemiplegia RUE/RLE 0/5.  Sensation: decreased on right   Coordination/ Gait: gait not assessed    LABS:                        11.6   7.69  )-----------( 186      ( 24 May 2020 05:27 )             35.2    05-24    133<L>  |  102  |  24<H>  ----------------------------<  130<H>  3.4<L>   |  22  |  0.69    Ca    8.3<L>      24 May 2020 05:27          IMAGING: Reviewed by me.     CT Head No Cont (05.11.20)  No change in moderate residual parenchymal hemorrhage in the left frontal parenchyma and basal ganglia compared with 5/10/2020. No change in mass effect.    (05.09.20)  NONCONTRAST CT BRAIN: Large intraparenchymal hemorrhage involving the left basal ganglia, frontal lobe and medial temporal lobe as described above. Near complete effacement of the left lateral ventricle. There is 0.4 cm of rightward midline shift.    CTA BRAIN AND NECK: Patent cervical circulation.  No identified aneurysm or AVM. Decreased size of the left middle cerebral artery may be due to distal branch occlusion or compression by the intraparenchymal hemorrhage.

## 2020-05-24 NOTE — PROGRESS NOTE ADULT - SUBJECTIVE AND OBJECTIVE BOX
INTERVAL HPI/OVERNIGHT EVENTS:    s/p PEG        MEDICATIONS  (STANDING):  cefTRIAXone   IVPB 1000 milliGRAM(s) IV Intermittent every 24 hours  diltiazem    Tablet 90 milliGRAM(s) Oral every 6 hours  doxazosin 2 milliGRAM(s) Oral at bedtime  enoxaparin Injectable 40 milliGRAM(s) SubCutaneous <User Schedule>  glucagon  Injectable 3 milliGRAM(s) IV Push once  levETIRAcetam  Solution 500 milliGRAM(s) Oral two times a day  nystatin    Suspension 4 milliLiter(s) Oral four times a day  polyethylene glycol 3350 17 Gram(s) Oral every 12 hours  senna 2 Tablet(s) Oral at bedtime    MEDICATIONS  (PRN):  acetaminophen   Tablet .. 650 milliGRAM(s) Oral every 6 hours PRN Temp greater or equal to 38C (100.4F), Mild Pain (1 - 3), Moderate Pain (4 - 6), Severe Pain (7 - 10)  ondansetron Injectable 4 milliGRAM(s) IV Push every 6 hours PRN Nausea and/or Vomiting  sodium chloride 3%  Inhalation 3 milliLiter(s) Inhalation every 6 hours PRN secretions      Allergies    No Known Allergies    Intolerances        Review of Systems: unable to obtain         Vital Signs Last 24 Hrs  T(C): 36.6 (23 May 2020 08:00), Max: 37.1 (22 May 2020 20:00)  T(F): 97.8 (23 May 2020 08:00), Max: 98.7 (22 May 2020 20:00)  HR: 69 (23 May 2020 10:00) (63 - 107)  BP: 106/57 (23 May 2020 10:00) (88/57 - 144/59)  BP(mean): 69 (23 May 2020 10:00) (67 - 88)  RR: 13 (23 May 2020 10:00) (13 - 19)  SpO2: 96% (23 May 2020 10:00) (95% - 100%)    PHYSICAL EXAM:    Constitutional: NAD  HEENT: ncat   Cardiovascular: S1 and S2, RRR, no M  Gastrointestinal: soft, NT/ND, +PEG   Extremities: No peripheral edema  Neurological: aphasic         LABS:                        12.6   8.14  )-----------( 164      ( 23 May 2020 05:21 )             38.0     05-23    136  |  106  |  22  ----------------------------<  105<H>  3.8   |  20<L>  |  0.62    Ca    8.5      23 May 2020 05:21        Urinalysis Basic - ( 22 May 2020 07:17 )    Color: Yellow / Appearance: Slightly Turbid / S.011 / pH: x  Gluc: x / Ketone: Negative  / Bili: Negative / Urobili: Negative   Blood: x / Protein: 100 mg/dL / Nitrite: Negative   Leuk Esterase: Large / RBC: 5 /hpf / WBC >50   Sq Epi: x / Non Sq Epi: Few / Bacteria: Many        RADIOLOGY & ADDITIONAL TESTS:

## 2020-05-25 LAB
-  AMIKACIN: SIGNIFICANT CHANGE UP
-  AZTREONAM: SIGNIFICANT CHANGE UP
-  CEFEPIME: SIGNIFICANT CHANGE UP
-  CEFTAZIDIME: SIGNIFICANT CHANGE UP
-  CIPROFLOXACIN: SIGNIFICANT CHANGE UP
-  GENTAMICIN: SIGNIFICANT CHANGE UP
-  IMIPENEM: SIGNIFICANT CHANGE UP
-  LEVOFLOXACIN: SIGNIFICANT CHANGE UP
-  MEROPENEM: SIGNIFICANT CHANGE UP
-  PIPERACILLIN/TAZOBACTAM: SIGNIFICANT CHANGE UP
-  TOBRAMYCIN: SIGNIFICANT CHANGE UP
ANION GAP SERPL CALC-SCNC: 8 MMOL/L — SIGNIFICANT CHANGE UP (ref 5–17)
BUN SERPL-MCNC: 17 MG/DL — SIGNIFICANT CHANGE UP (ref 7–23)
CALCIUM SERPL-MCNC: 7.9 MG/DL — LOW (ref 8.4–10.5)
CHLORIDE SERPL-SCNC: 108 MMOL/L — SIGNIFICANT CHANGE UP (ref 96–108)
CO2 SERPL-SCNC: 16 MMOL/L — LOW (ref 22–31)
CREAT SERPL-MCNC: 0.51 MG/DL — SIGNIFICANT CHANGE UP (ref 0.5–1.3)
CULTURE RESULTS: SIGNIFICANT CHANGE UP
DIGOXIN SERPL-MCNC: 0.5 NG/ML — LOW (ref 0.8–2)
GLUCOSE SERPL-MCNC: 117 MG/DL — HIGH (ref 70–99)
HCT VFR BLD CALC: 34.2 % — LOW (ref 34.5–45)
HGB BLD-MCNC: 11.1 G/DL — LOW (ref 11.5–15.5)
MCHC RBC-ENTMCNC: 32.5 GM/DL — SIGNIFICANT CHANGE UP (ref 32–36)
MCHC RBC-ENTMCNC: 32.6 PG — SIGNIFICANT CHANGE UP (ref 27–34)
MCV RBC AUTO: 100.6 FL — HIGH (ref 80–100)
METHOD TYPE: SIGNIFICANT CHANGE UP
NRBC # BLD: 0 /100 WBCS — SIGNIFICANT CHANGE UP (ref 0–0)
ORGANISM # SPEC MICROSCOPIC CNT: SIGNIFICANT CHANGE UP
ORGANISM # SPEC MICROSCOPIC CNT: SIGNIFICANT CHANGE UP
PLATELET # BLD AUTO: 133 K/UL — LOW (ref 150–400)
POTASSIUM SERPL-MCNC: 4.2 MMOL/L — SIGNIFICANT CHANGE UP (ref 3.5–5.3)
POTASSIUM SERPL-SCNC: 4.2 MMOL/L — SIGNIFICANT CHANGE UP (ref 3.5–5.3)
RBC # BLD: 3.4 M/UL — LOW (ref 3.8–5.2)
RBC # FLD: 13.9 % — SIGNIFICANT CHANGE UP (ref 10.3–14.5)
SODIUM SERPL-SCNC: 132 MMOL/L — LOW (ref 135–145)
SPECIMEN SOURCE: SIGNIFICANT CHANGE UP
WBC # BLD: 5.35 K/UL — SIGNIFICANT CHANGE UP (ref 3.8–10.5)
WBC # FLD AUTO: 5.35 K/UL — SIGNIFICANT CHANGE UP (ref 3.8–10.5)

## 2020-05-25 PROCEDURE — 99233 SBSQ HOSP IP/OBS HIGH 50: CPT

## 2020-05-25 RX ORDER — SODIUM CHLORIDE 9 MG/ML
500 INJECTION INTRAMUSCULAR; INTRAVENOUS; SUBCUTANEOUS ONCE
Refills: 0 | Status: COMPLETED | OUTPATIENT
Start: 2020-05-25 | End: 2020-05-25

## 2020-05-25 RX ORDER — CEFEPIME 1 G/1
1000 INJECTION, POWDER, FOR SOLUTION INTRAMUSCULAR; INTRAVENOUS EVERY 12 HOURS
Refills: 0 | Status: DISCONTINUED | OUTPATIENT
Start: 2020-05-26 | End: 2020-05-27

## 2020-05-25 RX ORDER — CEFEPIME 1 G/1
1000 INJECTION, POWDER, FOR SOLUTION INTRAMUSCULAR; INTRAVENOUS ONCE
Refills: 0 | Status: COMPLETED | OUTPATIENT
Start: 2020-05-25 | End: 2020-05-25

## 2020-05-25 RX ORDER — CEFEPIME 1 G/1
INJECTION, POWDER, FOR SOLUTION INTRAMUSCULAR; INTRAVENOUS
Refills: 0 | Status: DISCONTINUED | OUTPATIENT
Start: 2020-05-25 | End: 2020-05-27

## 2020-05-25 RX ADMIN — Medication 90 MILLIGRAM(S): at 05:29

## 2020-05-25 RX ADMIN — ENOXAPARIN SODIUM 40 MILLIGRAM(S): 100 INJECTION SUBCUTANEOUS at 16:49

## 2020-05-25 RX ADMIN — Medication 90 MILLIGRAM(S): at 23:05

## 2020-05-25 RX ADMIN — LEVETIRACETAM 500 MILLIGRAM(S): 250 TABLET, FILM COATED ORAL at 05:30

## 2020-05-25 RX ADMIN — SODIUM CHLORIDE 1000 MILLILITER(S): 9 INJECTION INTRAMUSCULAR; INTRAVENOUS; SUBCUTANEOUS at 05:30

## 2020-05-25 RX ADMIN — Medication 4 MILLILITER(S): at 16:05

## 2020-05-25 RX ADMIN — Medication 90 MILLIGRAM(S): at 16:05

## 2020-05-25 RX ADMIN — LEVETIRACETAM 500 MILLIGRAM(S): 250 TABLET, FILM COATED ORAL at 16:05

## 2020-05-25 RX ADMIN — Medication 2 MILLIGRAM(S): at 23:05

## 2020-05-25 RX ADMIN — Medication 4 MILLILITER(S): at 23:05

## 2020-05-25 RX ADMIN — Medication 90 MILLIGRAM(S): at 12:08

## 2020-05-25 RX ADMIN — Medication 4 MILLILITER(S): at 12:08

## 2020-05-25 RX ADMIN — SODIUM CHLORIDE 1000 MILLILITER(S): 9 INJECTION INTRAMUSCULAR; INTRAVENOUS; SUBCUTANEOUS at 21:47

## 2020-05-25 RX ADMIN — Medication 4 MILLILITER(S): at 05:29

## 2020-05-25 RX ADMIN — CEFTRIAXONE 100 MILLIGRAM(S): 500 INJECTION, POWDER, FOR SOLUTION INTRAMUSCULAR; INTRAVENOUS at 05:30

## 2020-05-25 RX ADMIN — SODIUM CHLORIDE 3 MILLILITER(S): 9 INJECTION INTRAMUSCULAR; INTRAVENOUS; SUBCUTANEOUS at 06:42

## 2020-05-25 RX ADMIN — CEFEPIME 100 MILLIGRAM(S): 1 INJECTION, POWDER, FOR SOLUTION INTRAMUSCULAR; INTRAVENOUS at 14:42

## 2020-05-25 NOTE — PROGRESS NOTE ADULT - SUBJECTIVE AND OBJECTIVE BOX
THE PATIENT WAS SEEN AND EXAMINED BY ME WITH THE HOUSESTAFF AND STROKE TEAM DURING MORNING ROUNDS.   HPI:  87F w/ PMH of HTN, HLD, transferred from [facility] for NSG eval after found to have large L. BG IPH. Patient found to have severe headache with subsequent syncopal episode. Found to be minimally responsive and so initially intubated for airway protection. Was hypertensive,  started on Caredene and prop gtt. Cardene discontinue prior to transfer. Upon arrival, with sedation held she was unable to open her eyes, not following commands, moving spontaneously on the L, RUE loc, RLE WDs. After extensive discussion with the family regarding goals of care, she was brought to the operating room for emergent clot evacuation now extubated.     SUBJECTIVE: No events overnight.  No new neurologic complaints.      acetaminophen   Tablet .. 650 milliGRAM(s) Oral every 6 hours PRN  cefTRIAXone   IVPB 1000 milliGRAM(s) IV Intermittent every 24 hours  diltiazem    Tablet 90 milliGRAM(s) Oral every 6 hours  doxazosin 2 milliGRAM(s) Oral at bedtime  enoxaparin Injectable 40 milliGRAM(s) SubCutaneous <User Schedule>  levETIRAcetam  Solution 500 milliGRAM(s) Oral two times a day  nystatin    Suspension 4 milliLiter(s) Oral four times a day  ondansetron Injectable 4 milliGRAM(s) IV Push every 6 hours PRN  polyethylene glycol 3350 17 Gram(s) Oral every 12 hours  senna 2 Tablet(s) Oral at bedtime  sodium chloride 3%  Inhalation 3 milliLiter(s) Inhalation every 6 hours PRN      PHYSICAL EXAM:   Vital Signs Last 24 Hrs  T(C): 37.2 (25 May 2020 04:00), Max: 37.2 (25 May 2020 04:00)  T(F): 98.9 (25 May 2020 04:00), Max: 98.9 (25 May 2020 04:00)  HR: 94 (25 May 2020 06:00) (60 - 125)  BP: 139/64 (25 May 2020 06:00) (72/52 - 139/64)  BP(mean): 84 (25 May 2020 06:00) (55 - 84)  RR: 25 (25 May 2020 06:00) (13 - 25)  SpO2: 94% (25 May 2020 06:00) (94% - 99%)    General: No acute distress  HEENT: eyes open, right gaze palsy , R HHA  Abdomen: Soft, nontender, nondistended   Extremities: No edema    NEUROLOGICAL EXAM:  Mental status: Awake, eyes open and somewhat minimally attentive, not following commands, some unintelligible speech   Cranial Nerves: right facial droop, right gaze palsy, right homonymous   Motor exam: left arm moves spontaneously against gravity within bed, LLE with some flexion in bed, right hemiplegia RUE/RLE 0/5.  Sensation: decreased on right   Coordination/ Gait: gait not assessed    LABS:                        11.1   5.35  )-----------( 133      ( 25 May 2020 00:15 )             34.2    05-25    132<L>  |  108  |  17  ----------------------------<  117<H>  4.2   |  16<L>  |  0.51    Ca    7.9<L>      25 May 2020 00:15          IMAGING: Reviewed by me.   CT Head No Cont (05.11.20)  No change in moderate residual parenchymal hemorrhage in the left frontal parenchyma and basal ganglia compared with 5/10/2020. No change in mass effect.    (05.09.20)  NONCONTRAST CT BRAIN: Large intraparenchymal hemorrhage involving the left basal ganglia, frontal lobe and medial temporal lobe as described above. Near complete effacement of the left lateral ventricle. There is 0.4 cm of rightward midline shift.    CTA BRAIN AND NECK: Patent cervical circulation.  No identified aneurysm or AVM. Decreased size of the left middle cerebral artery may be due to distal branch occlusion or compression by the intraparenchymal hemorrhage.

## 2020-05-25 NOTE — CONSULT NOTE ADULT - SUBJECTIVE AND OBJECTIVE BOX
HPI:   Patient is a 84y female with PMHx of HTN, HLD, presented with large left frontal/basal ganglia IPH, etiology unknown, possibility of underlying hypertension S/P larry clot removal. Now with PAF/A flutter. She had a urine cultured ordered it came back positive for Pseudomonas and ID called for antibiotic management.    REVIEW OF SYSTEMS:  not able to obtain     PAST MEDICAL & SURGICAL HISTORY:  HTN (hypertension)      Allergies    No Known Allergies    Intolerances    FAMILY HISTORY: non contributory       SOCIAL HISTORY:  unable to obtain she is not verbal she does to respond to questions    T(F): 98.9 (05-25-20 @ 04:00), Max: 98.9 (05-25-20 @ 04:00)  HR: 94 (05-25-20 @ 12:00)  BP: 119/61 (05-25-20 @ 12:00)  RR: 14 (05-25-20 @ 12:00)  SpO2: 100% (05-25-20 @ 12:00)  Wt(kg): --    PHYSICAL EXAM:  General: alert, no acute distress  Eyes:  anicteric, no conjunctival injection, no discharge  Oropharynx: no lesions or injection 	  Lungs: clear to auscultation  Heart: regular rate and rhythm; no murmur,  Abdomen: soft, nondistended, nontender,  Skin: no lesions  Extremities: no clubbing, cyanosis, or edema  Neurologic: alert, eyes are open she is laying in bed     LAB RESULTS:                        11.1   5.35  )-----------( 133      ( 25 May 2020 00:15 )             34.2     05-25    132<L>  |  108  |  17  ----------------------------<  117<H>  4.2   |  16<L>  |  0.51    Ca    7.9<L>      25 May 2020 00:15            MICROBIOLOGY:  RECENT CULTURES:  05-23 @ 07:04 .Urine Clean Catch (Midstream) Pseudomonas aeruginosa    >100,000 CFU/ml Pseudomonas aeruginosa            RADIOLOGY REVIEWED:      Antimicrobials Day #    cefepime   IVPB      nystatin    Suspension 4 milliLiter(s) Oral four times a day    Other Medications:  acetaminophen   Tablet .. 650 milliGRAM(s) Oral every 6 hours PRN  diltiazem    Tablet 90 milliGRAM(s) Oral every 6 hours  doxazosin 2 milliGRAM(s) Oral at bedtime  enoxaparin Injectable 40 milliGRAM(s) SubCutaneous <User Schedule>  levETIRAcetam  Solution 500 milliGRAM(s) Oral two times a day  ondansetron Injectable 4 milliGRAM(s) IV Push every 6 hours PRN  polyethylene glycol 3350 17 Gram(s) Oral every 12 hours  senna 2 Tablet(s) Oral at bedtime  sodium chloride 3%  Inhalation 3 milliLiter(s) Inhalation every 6 hours PRN

## 2020-05-25 NOTE — PROGRESS NOTE ADULT - ASSESSMENT
87F with PMHx of HTN, HLD, presented with large left frontal/basal ganglia IPH, etiology unknown, possibility of underlying hypertension S/P larry clot removal. Now with PAF/A flutter.     Impression: Left frontal/basal ganglia IPH likely due to chronic HTN    NEURO: neurologically without acute change, continue close monitoring for neurologic deterioration as patient with cerebral edema, mass effect, and brain compression, she remains bedbound with immobility due to frailty and functional quadriplegia, maintain normotension, home statin therapy if applicable, MR brain upon resolution of hemorrhage for further evaluation, PMR: [facility].    ANTITHROMBOTIC THERAPY: none at this time in setting of ICH and increased risk for bleeding, pending outpatient follow up- 4 to 6 weeks, repeat CT Head before starting antithrombotic Rx, clinical course and radiological stability will need to determine timeline for initiation of AC vs. Watchman in setting of atrial fibrillation.     PULMONARY: CXR on 5/11 shows enlarged heart, lungs clear, protecting airway, saturating well     CARDIOVASCULAR:  TTE: ef 65-70%, moderate AS, mild AR, LV remodeling, moderate stage II diastolic dysfunction borderline pulmonary htn , cardiac monitoring on 05/24: showed episodes of bradycardia and pauses 3-4 seconds, greatest 5.6 second conversion pause, STAT EKG showed ST depression suggestive of digoxin effect, dig level 0.8, digoxin discontinued, troponin neg x2. Given glucagon. Will reintroduce rate control medication slowly. EP at bedside during episode and agreed with plan. Cardiology consulted with Dr. Boyd velázquez.       SBP goal: <140mMHg    GASTROINTESTINAL:  dysphagia screen failed, NGT placed during interim. SLP evaluated patient on 5/20, recommend MBS, failed MBS, GI consulted: s/p PEG on 5/22- tolerating TFs. Nutrition consulted.     Diet: NPO with TF via PEG    RENAL: BUN/Cr without acute change, good urine output, hypokalemic s/p supplementation      Na Goal: Greater than 135     Garcia: n    HEMATOLOGY: H/H without acute change, no active bleeding, Platelets 186     DVT ppx: Heparin s.c [] LMWH [x]     ID: afebrile, no leukocytosis, UA showed UTI- started on ceftriaxone. UCx pending.    Other: Called son, Shahid Alberto ([phone]) and updated him on plan and answered all questions, at length.      DISPOSITION: [facility] once  bed available.     CORE MEASURES:        Admission NIHSS:      TPA: [] YES [x] NO      LDL/HDL:100/50     Depression Screen: NA     Statin Therapy: as noted     Dysphagia Screen: [] PASS [x] FAIL     Smoking [] YES [x] NO      Afib [x] YES [] NO     Stroke Education [x] YES [] NO    Obtain screening lower extremity venous ultrasound in patients who meet 1 or more of the following criteria as patient is high risk for DVT/PE on admission:   [] History of DVT/PE  []Hypercoagulable states (Factor V Leiden, Cancer, OCP, etc. )  []Prolonged immobility (hemiplegia/hemiparesis/post operative or any other extended immobilization)  [] Transferred from outside facility (Rehab or Long term care)  [] Age </= to 50

## 2020-05-25 NOTE — CONSULT NOTE ADULT - ASSESSMENT
84y female with PMHx of HTN, HLD, presented with large left frontal/basal ganglia IPH, etiology unknown, possibility of underlying hypertension S/P larry clot removal. Now with PAF/A flutter. She had a urine cultured ordered it came back positive for Pseudomonas and ID called for antibiotic management.   reviewed orders that patient was empirically started on CTX   Flow sheets reviewed and she is afebrile and her wbc wnl     Plan   DC CTX and start Cefepime 1 g IV q12 to target Pseudomonas

## 2020-05-25 NOTE — PROVIDER CONTACT NOTE (OTHER) - SITUATION
Pt tachycardic 120s-130s, pt received cardizem 90mg via PEG at 23:01. Pt tachycardic 120s-130s, pt received cardizem 90mg via PEG at 23:01. /56

## 2020-05-25 NOTE — PROGRESS NOTE ADULT - SUBJECTIVE AND OBJECTIVE BOX
Subjective: Patient seen and examined. No new events except as noted.     SUBJECTIVE/ROS:        MEDICATIONS:  MEDICATIONS  (STANDING):  cefTRIAXone   IVPB 1000 milliGRAM(s) IV Intermittent every 24 hours  diltiazem    Tablet 90 milliGRAM(s) Oral every 6 hours  doxazosin 2 milliGRAM(s) Oral at bedtime  enoxaparin Injectable 40 milliGRAM(s) SubCutaneous <User Schedule>  levETIRAcetam  Solution 500 milliGRAM(s) Oral two times a day  nystatin    Suspension 4 milliLiter(s) Oral four times a day  polyethylene glycol 3350 17 Gram(s) Oral every 12 hours  senna 2 Tablet(s) Oral at bedtime      PHYSICAL EXAM:  T(C): 37.2 (05-25-20 @ 04:00), Max: 37.2 (05-25-20 @ 04:00)  HR: 80 (05-25-20 @ 10:00) (60 - 125)  BP: 119/55 (05-25-20 @ 10:00) (100/52 - 139/64)  RR: 12 (05-25-20 @ 10:00) (12 - 25)  SpO2: 100% (05-25-20 @ 10:00) (94% - 100%)  Wt(kg): --  I&O's Summary    24 May 2020 07:01  -  25 May 2020 07:00  --------------------------------------------------------  IN: 2400 mL / OUT: 1950 mL / NET: 450 mL            JVP: Normal  Neck: supple  Lung: clear   CV: S1 S2 , Murmur:  Abd: soft  Ext: No edema  neuro: Awake / alert  Psych: flat affect  Skin: normal``    LABS/DATA:    CARDIAC MARKERS:                                11.1   5.35  )-----------( 133      ( 25 May 2020 00:15 )             34.2     05-25    132<L>  |  108  |  17  ----------------------------<  117<H>  4.2   |  16<L>  |  0.51    Ca    7.9<L>      25 May 2020 00:15      proBNP:   Lipid Profile:   HgA1c:   TSH:     TELE:  EKG:

## 2020-05-26 ENCOUNTER — APPOINTMENT (OUTPATIENT)
Dept: NEUROSURGERY | Facility: CLINIC | Age: 85
End: 2020-05-26

## 2020-05-26 LAB
ANION GAP SERPL CALC-SCNC: 11 MMOL/L — SIGNIFICANT CHANGE UP (ref 5–17)
BUN SERPL-MCNC: 16 MG/DL — SIGNIFICANT CHANGE UP (ref 7–23)
CALCIUM SERPL-MCNC: 8.4 MG/DL — SIGNIFICANT CHANGE UP (ref 8.4–10.5)
CHLORIDE SERPL-SCNC: 108 MMOL/L — SIGNIFICANT CHANGE UP (ref 96–108)
CO2 SERPL-SCNC: 21 MMOL/L — LOW (ref 22–31)
CREAT SERPL-MCNC: 0.52 MG/DL — SIGNIFICANT CHANGE UP (ref 0.5–1.3)
DIGOXIN SERPL-MCNC: <0.4 NG/ML — LOW (ref 0.8–2)
GLUCOSE SERPL-MCNC: 134 MG/DL — HIGH (ref 70–99)
HCT VFR BLD CALC: 31.9 % — LOW (ref 34.5–45)
HGB BLD-MCNC: 10.3 G/DL — LOW (ref 11.5–15.5)
MCHC RBC-ENTMCNC: 31.5 PG — SIGNIFICANT CHANGE UP (ref 27–34)
MCHC RBC-ENTMCNC: 32.3 GM/DL — SIGNIFICANT CHANGE UP (ref 32–36)
MCV RBC AUTO: 97.6 FL — SIGNIFICANT CHANGE UP (ref 80–100)
NRBC # BLD: 0 /100 WBCS — SIGNIFICANT CHANGE UP (ref 0–0)
PLATELET # BLD AUTO: 173 K/UL — SIGNIFICANT CHANGE UP (ref 150–400)
POTASSIUM SERPL-MCNC: 3.6 MMOL/L — SIGNIFICANT CHANGE UP (ref 3.5–5.3)
POTASSIUM SERPL-SCNC: 3.6 MMOL/L — SIGNIFICANT CHANGE UP (ref 3.5–5.3)
RBC # BLD: 3.27 M/UL — LOW (ref 3.8–5.2)
RBC # FLD: 13.9 % — SIGNIFICANT CHANGE UP (ref 10.3–14.5)
SODIUM SERPL-SCNC: 140 MMOL/L — SIGNIFICANT CHANGE UP (ref 135–145)
WBC # BLD: 4.09 K/UL — SIGNIFICANT CHANGE UP (ref 3.8–10.5)
WBC # FLD AUTO: 4.09 K/UL — SIGNIFICANT CHANGE UP (ref 3.8–10.5)

## 2020-05-26 PROCEDURE — 99232 SBSQ HOSP IP/OBS MODERATE 35: CPT

## 2020-05-26 PROCEDURE — 99233 SBSQ HOSP IP/OBS HIGH 50: CPT

## 2020-05-26 RX ORDER — SODIUM CHLORIDE 9 MG/ML
1000 INJECTION INTRAMUSCULAR; INTRAVENOUS; SUBCUTANEOUS ONCE
Refills: 0 | Status: COMPLETED | OUTPATIENT
Start: 2020-05-26 | End: 2020-05-26

## 2020-05-26 RX ADMIN — SODIUM CHLORIDE 2000 MILLILITER(S): 9 INJECTION INTRAMUSCULAR; INTRAVENOUS; SUBCUTANEOUS at 00:27

## 2020-05-26 RX ADMIN — CEFEPIME 100 MILLIGRAM(S): 1 INJECTION, POWDER, FOR SOLUTION INTRAMUSCULAR; INTRAVENOUS at 17:51

## 2020-05-26 RX ADMIN — Medication 90 MILLIGRAM(S): at 06:18

## 2020-05-26 RX ADMIN — Medication 90 MILLIGRAM(S): at 12:43

## 2020-05-26 RX ADMIN — LEVETIRACETAM 500 MILLIGRAM(S): 250 TABLET, FILM COATED ORAL at 06:19

## 2020-05-26 RX ADMIN — Medication 4 MILLILITER(S): at 17:52

## 2020-05-26 RX ADMIN — Medication 90 MILLIGRAM(S): at 17:52

## 2020-05-26 RX ADMIN — Medication 4 MILLILITER(S): at 06:19

## 2020-05-26 RX ADMIN — Medication 2 MILLIGRAM(S): at 23:31

## 2020-05-26 RX ADMIN — Medication 4 MILLILITER(S): at 12:43

## 2020-05-26 RX ADMIN — Medication 4 MILLILITER(S): at 23:31

## 2020-05-26 RX ADMIN — ENOXAPARIN SODIUM 40 MILLIGRAM(S): 100 INJECTION SUBCUTANEOUS at 17:52

## 2020-05-26 RX ADMIN — CEFEPIME 100 MILLIGRAM(S): 1 INJECTION, POWDER, FOR SOLUTION INTRAMUSCULAR; INTRAVENOUS at 06:19

## 2020-05-26 RX ADMIN — Medication 90 MILLIGRAM(S): at 23:30

## 2020-05-26 RX ADMIN — LEVETIRACETAM 500 MILLIGRAM(S): 250 TABLET, FILM COATED ORAL at 17:52

## 2020-05-26 NOTE — PROGRESS NOTE ADULT - SUBJECTIVE AND OBJECTIVE BOX
THE PATIENT WAS SEEN AND EXAMINED BY ME WITH THE HOUSESTAFF AND STROKE TEAM DURING MORNING ROUNDS.   HPI:  87F w/ PMH of HTN, HLD, transferred from [facility] for NSG eval after found to have large L. BG IPH. Patient found to have severe headache with subsequent syncopal episode. Found to be minimally responsive and so initially intubated for airway protection. Was hypertensive,  started on Caredene and prop gtt. Cardene discontinue prior to transfer. Upon arrival, with sedation held she was unable to open her eyes, not following commands, moving spontaneously on the L, RUE loc, RLE WDs. After extensive discussion with the family regarding goals of care, she was brought to the operating room for emergent clot evacuation now extubated.     SUBJECTIVE: Hypotension noted overnight.     acetaminophen   Tablet .. 650 milliGRAM(s) Oral every 6 hours PRN  cefepime   IVPB      cefepime   IVPB 1000 milliGRAM(s) IV Intermittent every 12 hours  diltiazem    Tablet 90 milliGRAM(s) Oral every 6 hours  doxazosin 2 milliGRAM(s) Oral at bedtime  enoxaparin Injectable 40 milliGRAM(s) SubCutaneous <User Schedule>  levETIRAcetam  Solution 500 milliGRAM(s) Oral two times a day  nystatin    Suspension 4 milliLiter(s) Oral four times a day  ondansetron Injectable 4 milliGRAM(s) IV Push every 6 hours PRN  polyethylene glycol 3350 17 Gram(s) Oral every 12 hours  senna 2 Tablet(s) Oral at bedtime  sodium chloride 3%  Inhalation 3 milliLiter(s) Inhalation every 6 hours PRN      PHYSICAL EXAM:   Vital Signs Last 24 Hrs  T(C): 36.7 (26 May 2020 08:04), Max: 37 (25 May 2020 15:30)  T(F): 98.1 (26 May 2020 08:04), Max: 98.6 (25 May 2020 15:30)  HR: 65 (26 May 2020 08:00) (55 - 103)  BP: 125/51 (26 May 2020 08:00) (87/47 - 137/52)  BP(mean): 68 (26 May 2020 08:00) (60 - 85)  RR: 18 (26 May 2020 08:00) (11 - 18)  SpO2: 96% (26 May 2020 08:00) (96% - 100%)    General: No acute distress  HEENT: eyes open, right gaze palsy , R HHA  Abdomen: Soft, nontender, nondistended   Extremities: No edema    NEUROLOGICAL EXAM:  Mental status: Awake, eyes open and somewhat minimally attentive, not following commands, some unintelligible speech   Cranial Nerves: right facial droop, right gaze palsy, right homonymous   Motor exam: left arm moves spontaneously against gravity within bed, LLE with some flexion in bed, right hemiplegia RUE/RLE 0/5.  Sensation: decreased on right   Coordination/ Gait: gait not assessed    LABS:                        10.3   4.09  )-----------( 173      ( 26 May 2020 07:09 )             31.9    05-26    140  |  108  |  16  ----------------------------<  134<H>  3.6   |  21<L>  |  0.52    Ca    8.4      26 May 2020 07:09          IMAGING: Reviewed by me.     CT Head No Cont (05.11.20)  No change in moderate residual parenchymal hemorrhage in the left frontal parenchyma and basal ganglia compared with 5/10/2020. No change in mass effect.    (05.09.20)  NONCONTRAST CT BRAIN: Large intraparenchymal hemorrhage involving the left basal ganglia, frontal lobe and medial temporal lobe as described above. Near complete effacement of the left lateral ventricle. There is 0.4 cm of rightward midline shift.    CTA BRAIN AND NECK: Patent cervical circulation.  No identified aneurysm or AVM. Decreased size of the left middle cerebral artery may be due to distal branch occlusion or compression by the intraparenchymal hemorrhage. THE PATIENT WAS SEEN AND EXAMINED BY ME WITH THE HOUSESTAFF AND STROKE TEAM DURING MORNING ROUNDS.   HPI:  87F w/ PMH of HTN, HLD, transferred from [facility] for NSG eval after found to have large L. BG IPH. Patient found to have severe headache with subsequent syncopal episode. Found to be minimally responsive and so initially intubated for airway protection. Was hypertensive,  started on Caredene and prop gtt. Cardene discontinue prior to transfer. Upon arrival, with sedation held she was unable to open her eyes, not following commands, moving spontaneously on the L, RUE loc, RLE WDs. After extensive discussion with the family regarding goals of care, she was brought to the operating room for emergent clot evacuation now extubated.     SUBJECTIVE: Hypotension noted overnight.     acetaminophen   Tablet .. 650 milliGRAM(s) Oral every 6 hours PRN  cefepime   IVPB      cefepime   IVPB 1000 milliGRAM(s) IV Intermittent every 12 hours  diltiazem    Tablet 90 milliGRAM(s) Oral every 6 hours  doxazosin 2 milliGRAM(s) Oral at bedtime  enoxaparin Injectable 40 milliGRAM(s) SubCutaneous <User Schedule>  levETIRAcetam  Solution 500 milliGRAM(s) Oral two times a day  nystatin    Suspension 4 milliLiter(s) Oral four times a day  ondansetron Injectable 4 milliGRAM(s) IV Push every 6 hours PRN  polyethylene glycol 3350 17 Gram(s) Oral every 12 hours  senna 2 Tablet(s) Oral at bedtime  sodium chloride 3%  Inhalation 3 milliLiter(s) Inhalation every 6 hours PRN      PHYSICAL EXAM:   Vital Signs Last 24 Hrs  T(C): 36.7 (26 May 2020 08:04), Max: 37 (25 May 2020 15:30)  T(F): 98.1 (26 May 2020 08:04), Max: 98.6 (25 May 2020 15:30)  HR: 65 (26 May 2020 08:00) (55 - 103)  BP: 125/51 (26 May 2020 08:00) (87/47 - 137/52)  BP(mean): 68 (26 May 2020 08:00) (60 - 85)  RR: 18 (26 May 2020 08:00) (11 - 18)  SpO2: 96% (26 May 2020 08:00) (96% - 100%)    General: No acute distress  HEENT: eyes open, right gaze palsy , R HHA  Abdomen: Soft, nontender, nondistended   Extremities: No edema    NEUROLOGICAL EXAM:  Mental status: Awake, eyes open and somewhat  attentive, not following commands, some unintelligible speech   Cranial Nerves: right facial droop, right gaze palsy, right homonymous   Motor exam: left arm moves spontaneously against gravity  , LLE with some flexion in  , right hemiplegia RUE/RLE 0/5.  Sensation: decreased on right   Coordination/ Gait: gait not assessed    LABS:                        10.3   4.09  )-----------( 173      ( 26 May 2020 07:09 )             31.9    05-26    140  |  108  |  16  ----------------------------<  134<H>  3.6   |  21<L>  |  0.52    Ca    8.4      26 May 2020 07:09          IMAGING: Reviewed by me.     CT Head No Cont (05.11.20)  No change in moderate residual parenchymal hemorrhage in the left frontal parenchyma and basal ganglia compared with 5/10/2020. No change in mass effect.    (05.09.20)  NONCONTRAST CT BRAIN: Large intraparenchymal hemorrhage involving the left basal ganglia, frontal lobe and medial temporal lobe as described above. Near complete effacement of the left lateral ventricle. There is 0.4 cm of rightward midline shift.    CTA BRAIN AND NECK: Patent cervical circulation.  No identified aneurysm or AVM. Decreased size of the left middle cerebral artery may be due to distal branch occlusion or compression by the intraparenchymal hemorrhage. THE PATIENT WAS SEEN AND EXAMINED BY ME WITH THE HOUSESTAFF AND STROKE TEAM DURING MORNING ROUNDS.   HPI:  87F w/ PMH of HTN, HLD, transferred from [facility] for NSG eval after found to have large L. BG IPH. Patient found to have severe headache with subsequent syncopal episode. Found to be minimally responsive and so initially intubated for airway protection. Was hypertensive,  started on Caredene and prop gtt. Cardene discontinue prior to transfer. Upon arrival, with sedation held she was unable to open her eyes, not following commands, moving spontaneously on the L, RUE loc, RLE WDs. After extensive discussion with the family regarding goals of care, she was brought to the operating room for emergent clot evacuation now extubated.     SUBJECTIVE: Hypotension noted overnight.     acetaminophen   Tablet .. 650 milliGRAM(s) Oral every 6 hours PRN  cefepime   IVPB      cefepime   IVPB 1000 milliGRAM(s) IV Intermittent every 12 hours  diltiazem    Tablet 90 milliGRAM(s) Oral every 6 hours  doxazosin 2 milliGRAM(s) Oral at bedtime  enoxaparin Injectable 40 milliGRAM(s) SubCutaneous <User Schedule>  levETIRAcetam  Solution 500 milliGRAM(s) Oral two times a day  nystatin    Suspension 4 milliLiter(s) Oral four times a day  ondansetron Injectable 4 milliGRAM(s) IV Push every 6 hours PRN  polyethylene glycol 3350 17 Gram(s) Oral every 12 hours  senna 2 Tablet(s) Oral at bedtime  sodium chloride 3%  Inhalation 3 milliLiter(s) Inhalation every 6 hours PRN      PHYSICAL EXAM:   Vital Signs Last 24 Hrs  T(C): 36.7 (26 May 2020 08:04), Max: 37 (25 May 2020 15:30)  T(F): 98.1 (26 May 2020 08:04), Max: 98.6 (25 May 2020 15:30)  HR: 65 (26 May 2020 08:00) (55 - 103)  BP: 125/51 (26 May 2020 08:00) (87/47 - 137/52)  BP(mean): 68 (26 May 2020 08:00) (60 - 85)  RR: 18 (26 May 2020 08:00) (11 - 18)  SpO2: 96% (26 May 2020 08:00) (96% - 100%)    General: No acute distress  HEENT: eyes open, right gaze palsy , R HHA  Abdomen: Soft, nontender, nondistended   Extremities: No edema    NEUROLOGICAL EXAM:  Mental status: Awake, eyes open and somewhat  attentive, not following commands, some unintelligible speech   Cranial Nerves: right facial droop, right gaze palsy, right homonymous hemianopia to threat  Motor exam: left arm moves spontaneously against gravity  , LLE with some flexion, right hemiplegia RUE/RLE 0/5.  Sensation: decreased on right   Coordination/ Gait: gait not assessed    LABS:                        10.3   4.09  )-----------( 173      ( 26 May 2020 07:09 )             31.9    05-26    140  |  108  |  16  ----------------------------<  134<H>  3.6   |  21<L>  |  0.52    Ca    8.4      26 May 2020 07:09          IMAGING: Reviewed by me.     CT Head No Cont (05.11.20)  No change in moderate residual parenchymal hemorrhage in the left frontal parenchyma and basal ganglia compared with 5/10/2020. No change in mass effect.    (05.09.20)  NONCONTRAST CT BRAIN: Large intraparenchymal hemorrhage involving the left basal ganglia, frontal lobe and medial temporal lobe as described above. Near complete effacement of the left lateral ventricle. There is 0.4 cm of rightward midline shift.    CTA BRAIN AND NECK: Patent cervical circulation.  No identified aneurysm or AVM. Decreased size of the left middle cerebral artery may be due to distal branch occlusion or compression by the intraparenchymal hemorrhage.

## 2020-05-26 NOTE — PROGRESS NOTE ADULT - SUBJECTIVE AND OBJECTIVE BOX
Subjective: Patient seen and examined. No new events except as noted.     SUBJECTIVE/ROS:        MEDICATIONS:  MEDICATIONS  (STANDING):  cefepime   IVPB      cefepime   IVPB 1000 milliGRAM(s) IV Intermittent every 12 hours  diltiazem    Tablet 90 milliGRAM(s) Oral every 6 hours  doxazosin 2 milliGRAM(s) Oral at bedtime  enoxaparin Injectable 40 milliGRAM(s) SubCutaneous <User Schedule>  levETIRAcetam  Solution 500 milliGRAM(s) Oral two times a day  nystatin    Suspension 4 milliLiter(s) Oral four times a day  polyethylene glycol 3350 17 Gram(s) Oral every 12 hours  senna 2 Tablet(s) Oral at bedtime      PHYSICAL EXAM:  T(C): 36.7 (05-26-20 @ 08:04), Max: 37 (05-25-20 @ 15:30)  HR: 65 (05-26-20 @ 08:00) (55 - 103)  BP: 125/51 (05-26-20 @ 08:00) (87/47 - 137/52)  RR: 18 (05-26-20 @ 08:00) (11 - 18)  SpO2: 96% (05-26-20 @ 08:00) (96% - 100%)  Wt(kg): --  I&O's Summary    25 May 2020 07:01  -  26 May 2020 07:00  --------------------------------------------------------  IN: 2700 mL / OUT: 1700 mL / NET: 1000 mL            JVP: Normal  Neck: supple  Lung: clear   CV: S1 S2 , Murmur:  Abd: soft  Ext: No edema  Psych: flat affect  Skin: normal``    LABS/DATA:    CARDIAC MARKERS:                                10.3   4.09  )-----------( 173      ( 26 May 2020 07:09 )             31.9     05-26    140  |  108  |  16  ----------------------------<  134<H>  3.6   |  21<L>  |  0.52    Ca    8.4      26 May 2020 07:09      proBNP:   Lipid Profile:   HgA1c:   TSH:     TELE:  EKG:

## 2020-05-26 NOTE — PROGRESS NOTE ADULT - SUBJECTIVE AND OBJECTIVE BOX
Patient seen and examined at bedside.    Overnight Events:   No overnight events.     On Saturday morning had 5.6s pause while on 3 AV wilfredo blockers.     REVIEW OF SYSTEMS:  Constitutional:     [ ] negative [ ] fevers [ ] chills [ ] weight loss [ ] weight gain  HEENT:                  [ ] negative [ ] dry eyes [ ] eye irritation [ ] postnasal drip [ ] nasal congestion  CV:                         [ ] negative  [ ] chest pain [ ] orthopnea [ ] palpitations [ ] murmur  Resp:                     [ ] negative [ ] cough [ ] shortness of breath [ ] dyspnea [ ] wheezing [ ] sputum [ ]hemoptysis  GI:                          [ ] negative [ ] nausea [ ] vomiting [ ] diarrhea [ ] constipation [ ] abd pain [ ] dysphagia   :                        [ ] negative [ ] dysuria [ ] nocturia [ ] hematuria [ ] increased urinary frequency  Musculoskeletal: [ ] negative [ ] back pain [ ] myalgias [ ] arthralgias [ ] fracture  Skin:                       [ ] negative [ ] rash [ ] itch  Neurological:        [ ] negative [ ] headache [ ] dizziness [ ] syncope [ ] weakness [ ] numbness  Psychiatric:           [ ] negative [ ] anxiety [ ] depression  Endocrine:            [ ] negative [ ] diabetes [ ] thyroid problem  Heme/Lymph:      [ ] negative [ ] anemia [ ] bleeding problem  Allergic/Immune: [ ] negative [ ] itchy eyes [ ] nasal discharge [ ] hives [ ] angioedema    [ ] All other systems negative  [x] Unable to assess ROS due to CVA    Current Meds:  acetaminophen   Tablet .. 650 milliGRAM(s) Oral every 6 hours PRN  cefepime   IVPB      cefepime   IVPB 1000 milliGRAM(s) IV Intermittent every 12 hours  diltiazem    Tablet 90 milliGRAM(s) Oral every 6 hours  doxazosin 2 milliGRAM(s) Oral at bedtime  enoxaparin Injectable 40 milliGRAM(s) SubCutaneous <User Schedule>  levETIRAcetam  Solution 500 milliGRAM(s) Oral two times a day  nystatin    Suspension 4 milliLiter(s) Oral four times a day  ondansetron Injectable 4 milliGRAM(s) IV Push every 6 hours PRN  polyethylene glycol 3350 17 Gram(s) Oral every 12 hours  senna 2 Tablet(s) Oral at bedtime  sodium chloride 3%  Inhalation 3 milliLiter(s) Inhalation every 6 hours PRN      PAST MEDICAL & SURGICAL HISTORY:  HTN (hypertension)      Vitals:  T(F): 98.1 (05-26), Max: 98.6 (05-25)  HR: 92 (05-26) (55 - 103)  BP: 108/46 (05-26) (87/47 - 137/52)  RR: 16 (05-26)  SpO2: 98% (05-26)  I&O's Summary    25 May 2020 07:01  -  26 May 2020 07:00  --------------------------------------------------------  IN: 2700 mL / OUT: 1700 mL / NET: 1000 mL        Physical Exam:  Appearance: No acute distress; well appearing  Eyes: PERRL, EOMI, pink conjunctiva  HENT: Normal oral mucosa  Cardiovascular: RRR, S1, S2, no murmurs, rubs, or gallops; no edema; no JVD  Respiratory: Clear to auscultation bilaterally  Gastrointestinal: soft, non-tender, non-distended with normal bowel sounds  Musculoskeletal: No clubbing; no joint deformity   Neurologic: Non-focal  Lymphatic: No lymphadenopathy  Psychiatry: AAOx3, mood & affect appropriate  Skin: No rashes, ecchymoses, or cyanosis                          10.3   4.09  )-----------( 173      ( 26 May 2020 07:09 )             31.9     05-26    140  |  108  |  16  ----------------------------<  134<H>  3.6   |  21<L>  |  0.52    Ca    8.4      26 May 2020 07:09                    New ECG(s): Personally reviewed    Echo:  < from: Transthoracic Echocardiogram (05.12.20 @ 17:19) >  Conclusions:  1. Calcified aortic valve with decreased opening. Peak  transaortic valve gradient equals 34 mm Hg, mean  transaortic valve gradient equals 18 mm Hg, estimated  aortic valve area equals 1.3 sqcm (by continuity equation),  aortic valve velocity time integral equals 62 cm,  consistent with moderate aortic stenosis. Dimensionless  index = 0.42, consistent with moderate aortic stenosis.  Mild aortic regurgitation.  2. Increased relative wall thickness with normal left  ventricular mass index, consistent with concentric left  ventricular remodeling.  3. Hyperdynamic left ventricular systolic function.  4. Moderate diastolic dysfunction (Stage II).  5. The right ventricle is not well visualized; grossly  normal right ventricular systolic function.  6. Estimated right ventricular systolic pressure equals 37  mm Hg, assuming right atrial pressure equals 8 mm Hg,  consistent with borderline pulmonary hypertension.  *** No previous Echo exam.    < end of copied text >    Stress Testing:     Cath:    Imaging:    Interpretation of Telemetry:  A flutter 9 brief periods of 130 and 50's. But mostly wnl

## 2020-05-26 NOTE — PROGRESS NOTE ADULT - ASSESSMENT
87F with PMHx of HTN, HLD, presented with large left frontal/basal ganglia IPH, etiology unknown, possibility of underlying hypertension S/P larry clot removal. Now with PAF/A flutter.     Impression: Left frontal/basal ganglia IPH likely due to  HTN, new onset atrial fibrillation.     NEURO: neurologically without acute change, continue close monitoring for neurologic deterioration as patient with cerebral edema, mass effect, and brain compression, she remains bedbound with immobility due to frailty and functional quadriplegia, maintain normotension, home statin therapy if applicable, MR brain upon resolution of hemorrhage for further evaluation, PMR: [facility].    ANTITHROMBOTIC THERAPY: none at this time in setting of ICH and increased risk for bleeding, pending outpatient follow up- 4 to 6 weeks, repeat CT Head before starting antithrombotic Rx, clinical course and radiological stability will need to determine timeline for initiation of AC vs. Watchman in setting of atrial fibrillation.     PULMONARY: CXR on 5/11 shows enlarged heart, lungs clear, protecting airway, saturating well     CARDIOVASCULAR:  TTE: ef 65-70%, moderate AS, mild AR, LV remodeling, moderate stage II diastolic dysfunction borderline pulmonary htn , cardiac monitoring on 05/24: showed episodes of bradycardia and pauses  EP re-consulted for further recommendations. Cardiology consulted with Dr. Boyd velázquez.       SBP goal: <140mMHg    GASTROINTESTINAL:  dysphagia screen failed, . SLP evaluated patient on 5/20, recommend MBS, failed MBS, GI consulted: s/p PEG on 5/22- tolerating TFs. Nutrition consulted.     Diet: NPO with TF via PEG    RENAL: BUN/Cr without acute change, good urine output, hypokalemic s/p supplementation now resolved along with hyponatremia.      Na Goal: Greater than 135     Garcia: n    HEMATOLOGY: H/H without acute change, no active bleeding, Platelets 173     DVT ppx: Heparin s.c [] LMWH [x]     ID: afebrile, no leukocytosis, UA showed UTI- started on ceftriaxone. UCx pending.    Other: Called family , discussed plan and answered all questions per team.     DISPOSITION: [facility]      CORE MEASURES:        Admission NIHSS:      TPA: [] YES [x] NO      LDL/HDL:100/50     Depression Screen: NA     Statin Therapy: as noted     Dysphagia Screen: [] PASS [x] FAIL     Smoking [] YES [x] NO      Afib [x] YES [] NO     Stroke Education [x] YES [] NO    Obtain screening lower extremity venous ultrasound in patients who meet 1 or more of the following criteria as patient is high risk for DVT/PE on admission:   [] History of DVT/PE  []Hypercoagulable states (Factor V Leiden, Cancer, OCP, etc. )  []Prolonged immobility (hemiplegia/hemiparesis/post operative or any other extended immobilization)  [] Transferred from outside facility (Rehab or Long term care)  [] Age </= to 50

## 2020-05-26 NOTE — PROGRESS NOTE ADULT - SUBJECTIVE AND OBJECTIVE BOX
CC: f/u for UTI     Patient reports nothing     REVIEW OF SYSTEMS:  All other review of systems negative (Comprehensive ROS)      T(F): 98.1 (05-26-20 @ 08:04), Max: 98.6 (05-25-20 @ 15:30)  HR: 72 (05-26-20 @ 10:30)  BP: 134/97 (05-26-20 @ 10:30)  RR: 15 (05-26-20 @ 10:29)  SpO2: 97% (05-26-20 @ 10:30)  Wt(kg): --    PHYSICAL EXAM:  General: no acute distress  Eyes:  anicteric, no conjunctival injection, no discharge  Oropharynx: no lesions or injection 	  Lungs: clear to auscultation  Heart: regular rate and rhythm; no murmur  Abdomen: soft, nondistended, nontender  Skin: no lesions  Extremities: no clubbing, cyanosis, or edema  Neurologic: resting in bed     LAB RESULTS:                        10.3   4.09  )-----------( 173      ( 26 May 2020 07:09 )             31.9     05-26    140  |  108  |  16  ----------------------------<  134<H>  3.6   |  21<L>  |  0.52    Ca    8.4      26 May 2020 07:09          MICROBIOLOGY:  RECENT CULTURES:  05-23 @ 07:04 .Urine Clean Catch (Midstream) Pseudomonas aeruginosa    >100,000 CFU/ml Pseudomonas aeruginosa          RADIOLOGY REVIEWED:        Antimicrobials Day #    cefepime   IVPB      cefepime   IVPB 1000 milliGRAM(s) IV Intermittent every 12 hours  nystatin    Suspension 4 milliLiter(s) Oral four times a day    Other Medications Reviewed

## 2020-05-26 NOTE — PROGRESS NOTE ADULT - ASSESSMENT
aflutter    had pauses   off dig and BB  on cardizem with episodes of tachycardia  cont current meds , if further pauses or tachy froylan then fu wt EP for rate management and consideration of PPM   off a/c due to SAH

## 2020-05-26 NOTE — PROGRESS NOTE ADULT - ASSESSMENT
84y female with PMHx of HTN, HLD, presented with large left frontal/basal ganglia IPH, etiology unknown, possibility of underlying hypertension S/P larry clot removal. Now with PAF/A flutter. She had a urine cultured ordered it came back positive for Pseudomonas and ID called for antibiotic management.   reviewed orders that patient was empirically started on CTX   Flow sheets reviewed and she is afebrile and her wbc wnl     Plan   ·	day # 2 of Cefepime 1 g IV q12 to target Pseudomonas UTI, continue cefepime for today,  start on 5/27 she can be change to po Cipro 500 mg via peg bid

## 2020-05-26 NOTE — PROGRESS NOTE ADULT - ASSESSMENT
84 year old female h/o HTN, HLD, CVA (2013) p/w left basal ganglia hemorrhage with mass effect & midline shift s/p left sided craniectomy w/ hematoma evacuation 5/9/2020 course complicated by Afib and A flutter. Started on digoxin, diltiazem, and propranolol.  Had 5.6 second pause. Has since d/c digoxin and propranolol. 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    Afib/flutter  Currently rate controlled on diltiazem 90mg q6 hours  Has minimal time in 130';s and 50's but mostly rates controlled  Would start with single agent and uptitrate as necessary   Last pause was on 5/24 with multiple agents on board  Would Continue to monitor on tele  Currently not on A/C or amioderone, would defer until cleared from neurosurgical perspective  If patient has persistent pauses or any symptomatic tachy/bradycardia can consider ppm.     Please call EP with any changes in clinical status or questions.     Fuad Oviedo  Cardiology Fellow  [phone]    All Cardiology service information can be found 24/7 on [email or web address], password: [email or web address]

## 2020-05-26 NOTE — PROGRESS NOTE ADULT - SUBJECTIVE AND OBJECTIVE BOX
INTERVAL HPI/OVERNIGHT EVENTS:    overnight events noted  tolerating PEG feeds         MEDICATIONS  (STANDING):  cefTRIAXone   IVPB 1000 milliGRAM(s) IV Intermittent every 24 hours  diltiazem    Tablet 90 milliGRAM(s) Oral every 6 hours  doxazosin 2 milliGRAM(s) Oral at bedtime  enoxaparin Injectable 40 milliGRAM(s) SubCutaneous <User Schedule>  glucagon  Injectable 3 milliGRAM(s) IV Push once  levETIRAcetam  Solution 500 milliGRAM(s) Oral two times a day  nystatin    Suspension 4 milliLiter(s) Oral four times a day  polyethylene glycol 3350 17 Gram(s) Oral every 12 hours  senna 2 Tablet(s) Oral at bedtime    MEDICATIONS  (PRN):  acetaminophen   Tablet .. 650 milliGRAM(s) Oral every 6 hours PRN Temp greater or equal to 38C (100.4F), Mild Pain (1 - 3), Moderate Pain (4 - 6), Severe Pain (7 - 10)  ondansetron Injectable 4 milliGRAM(s) IV Push every 6 hours PRN Nausea and/or Vomiting  sodium chloride 3%  Inhalation 3 milliLiter(s) Inhalation every 6 hours PRN secretions      Allergies    No Known Allergies    Intolerances        Review of Systems: unable to obtain         Vital Signs Last 24 Hrs  T(C): 36.6 (23 May 2020 08:00), Max: 37.1 (22 May 2020 20:00)  T(F): 97.8 (23 May 2020 08:00), Max: 98.7 (22 May 2020 20:00)  HR: 69 (23 May 2020 10:00) (63 - 107)  BP: 106/57 (23 May 2020 10:00) (88/57 - 144/59)  BP(mean): 69 (23 May 2020 10:00) (67 - 88)  RR: 13 (23 May 2020 10:00) (13 - 19)  SpO2: 96% (23 May 2020 10:00) (95% - 100%)    PHYSICAL EXAM:    Constitutional: NAD  HEENT: ncat   Cardiovascular: S1 and S2, RRR, no M  Gastrointestinal: soft, NT/ND, +PEG   Extremities: No peripheral edema  Neurological: aphasic         LABS:                        12.6   8.14  )-----------( 164      ( 23 May 2020 05:21 )             38.0     05-23    136  |  106  |  22  ----------------------------<  105<H>  3.8   |  20<L>  |  0.62    Ca    8.5      23 May 2020 05:21        Urinalysis Basic - ( 22 May 2020 07:17 )    Color: Yellow / Appearance: Slightly Turbid / S.011 / pH: x  Gluc: x / Ketone: Negative  / Bili: Negative / Urobili: Negative   Blood: x / Protein: 100 mg/dL / Nitrite: Negative   Leuk Esterase: Large / RBC: 5 /hpf / WBC >50   Sq Epi: x / Non Sq Epi: Few / Bacteria: Many        RADIOLOGY & ADDITIONAL TESTS:

## 2020-05-26 NOTE — PROGRESS NOTE ADULT - SUBJECTIVE AND OBJECTIVE BOX
HPI:  Patient seen and examined.  Max A with bed mob and transfers.    MEDICATIONS  (STANDING):  cefepime   IVPB      cefepime   IVPB 1000 milliGRAM(s) IV Intermittent every 12 hours  diltiazem    Tablet 90 milliGRAM(s) Oral every 6 hours  doxazosin 2 milliGRAM(s) Oral at bedtime  enoxaparin Injectable 40 milliGRAM(s) SubCutaneous <User Schedule>  levETIRAcetam  Solution 500 milliGRAM(s) Oral two times a day  nystatin    Suspension 4 milliLiter(s) Oral four times a day  polyethylene glycol 3350 17 Gram(s) Oral every 12 hours  senna 2 Tablet(s) Oral at bedtime    MEDICATIONS  (PRN):  acetaminophen   Tablet .. 650 milliGRAM(s) Oral every 6 hours PRN Temp greater or equal to 38C (100.4F), Mild Pain (1 - 3), Moderate Pain (4 - 6), Severe Pain (7 - 10)  ondansetron Injectable 4 milliGRAM(s) IV Push every 6 hours PRN Nausea and/or Vomiting  sodium chloride 3%  Inhalation 3 milliLiter(s) Inhalation every 6 hours PRN secretions    Vital Signs Last 24 Hrs  T(C): 36.7 (26 May 2020 08:04), Max: 37 (25 May 2020 15:30)  T(F): 98.1 (26 May 2020 08:04), Max: 98.6 (25 May 2020 15:30)  HR: 72 (26 May 2020 10:29) (55 - 103)  BP: 134/97 (26 May 2020 10:29) (87/47 - 137/52)  BP(mean): 68 (26 May 2020 08:00) (60 - 85)  RR: 15 (26 May 2020 10:29) (11 - 18)  SpO2: 97% (26 May 2020 10:29) (96% - 100%)    PHYSICAL EXAM  Constitutional - NAD, Comfortable   HEENT - Incision intact  Extremities - No C/C/E, No calf tenderness   Neurologic Exam -                  Aphasia   + alert   Follows some verbal instruction inconsistently   RUE/RLE 0/5, LUE/LLE 4+/5                           10.3   4.09  )-----------( 173      ( 26 May 2020 07:09 )             31.9     05-26    140  |  108  |  16  ----------------------------<  134<H>  3.6   |  21<L>  |  0.52    Ca    8.4      26 May 2020 07:09          ASSESSMENT/PLAN  87 year-old woman with history of hypertension and hyperlipidemia presented with large left BG hemorrhage s/p left larry evacuation Now with functional, gait, ADL, impairments.    PT - ROM, Bed Mob, Transfers, Amb with AD   OT - ADLs, ROM  SLP - Dysphagia eval and treat  Precautions - Falls, Cardiac  DVT Prophylaxis - Lovenox   Skin - Turn Q2hrs  Disposition recommendation-  [facility] when stable

## 2020-05-27 ENCOUNTER — TRANSCRIPTION ENCOUNTER (OUTPATIENT)
Age: 85
End: 2020-05-27

## 2020-05-27 LAB
ANION GAP SERPL CALC-SCNC: 10 MMOL/L — SIGNIFICANT CHANGE UP (ref 5–17)
BUN SERPL-MCNC: 14 MG/DL — SIGNIFICANT CHANGE UP (ref 7–23)
CALCIUM SERPL-MCNC: 8.7 MG/DL — SIGNIFICANT CHANGE UP (ref 8.4–10.5)
CHLORIDE SERPL-SCNC: 109 MMOL/L — HIGH (ref 96–108)
CO2 SERPL-SCNC: 23 MMOL/L — SIGNIFICANT CHANGE UP (ref 22–31)
CREAT SERPL-MCNC: 0.47 MG/DL — LOW (ref 0.5–1.3)
GLUCOSE SERPL-MCNC: 94 MG/DL — SIGNIFICANT CHANGE UP (ref 70–99)
HCT VFR BLD CALC: 32.2 % — LOW (ref 34.5–45)
HGB BLD-MCNC: 10.4 G/DL — LOW (ref 11.5–15.5)
MCHC RBC-ENTMCNC: 31.3 PG — SIGNIFICANT CHANGE UP (ref 27–34)
MCHC RBC-ENTMCNC: 32.3 GM/DL — SIGNIFICANT CHANGE UP (ref 32–36)
MCV RBC AUTO: 97 FL — SIGNIFICANT CHANGE UP (ref 80–100)
NRBC # BLD: 0 /100 WBCS — SIGNIFICANT CHANGE UP (ref 0–0)
PLATELET # BLD AUTO: 178 K/UL — SIGNIFICANT CHANGE UP (ref 150–400)
POTASSIUM SERPL-MCNC: 3.7 MMOL/L — SIGNIFICANT CHANGE UP (ref 3.5–5.3)
POTASSIUM SERPL-SCNC: 3.7 MMOL/L — SIGNIFICANT CHANGE UP (ref 3.5–5.3)
RBC # BLD: 3.32 M/UL — LOW (ref 3.8–5.2)
RBC # FLD: 13.6 % — SIGNIFICANT CHANGE UP (ref 10.3–14.5)
SARS-COV-2 RNA SPEC QL NAA+PROBE: SIGNIFICANT CHANGE UP
SODIUM SERPL-SCNC: 142 MMOL/L — SIGNIFICANT CHANGE UP (ref 135–145)
WBC # BLD: 4.69 K/UL — SIGNIFICANT CHANGE UP (ref 3.8–10.5)
WBC # FLD AUTO: 4.69 K/UL — SIGNIFICANT CHANGE UP (ref 3.8–10.5)

## 2020-05-27 PROCEDURE — 99233 SBSQ HOSP IP/OBS HIGH 50: CPT

## 2020-05-27 RX ORDER — SIMVASTATIN 20 MG/1
10 TABLET, FILM COATED ORAL AT BEDTIME
Refills: 0 | Status: DISCONTINUED | OUTPATIENT
Start: 2020-05-27 | End: 2020-05-28

## 2020-05-27 RX ORDER — SIMVASTATIN 20 MG/1
40 TABLET, FILM COATED ORAL AT BEDTIME
Refills: 0 | Status: DISCONTINUED | OUTPATIENT
Start: 2020-05-27 | End: 2020-05-27

## 2020-05-27 RX ORDER — DILTIAZEM HCL 120 MG
30 CAPSULE, EXT RELEASE 24 HR ORAL ONCE
Refills: 0 | Status: DISCONTINUED | OUTPATIENT
Start: 2020-05-27 | End: 2020-05-27

## 2020-05-27 RX ORDER — DILTIAZEM HCL 120 MG
30 CAPSULE, EXT RELEASE 24 HR ORAL ONCE
Refills: 0 | Status: COMPLETED | OUTPATIENT
Start: 2020-05-27 | End: 2020-05-27

## 2020-05-27 RX ORDER — CIPROFLOXACIN LACTATE 400MG/40ML
500 VIAL (ML) INTRAVENOUS EVERY 12 HOURS
Refills: 0 | Status: DISCONTINUED | OUTPATIENT
Start: 2020-05-27 | End: 2020-05-28

## 2020-05-27 RX ADMIN — LEVETIRACETAM 500 MILLIGRAM(S): 250 TABLET, FILM COATED ORAL at 17:22

## 2020-05-27 RX ADMIN — Medication 500 MILLIGRAM(S): at 17:22

## 2020-05-27 RX ADMIN — Medication 4 MILLILITER(S): at 06:23

## 2020-05-27 RX ADMIN — Medication 30 MILLIGRAM(S): at 13:52

## 2020-05-27 RX ADMIN — Medication 90 MILLIGRAM(S): at 12:43

## 2020-05-27 RX ADMIN — Medication 90 MILLIGRAM(S): at 06:23

## 2020-05-27 RX ADMIN — Medication 90 MILLIGRAM(S): at 17:22

## 2020-05-27 RX ADMIN — ENOXAPARIN SODIUM 40 MILLIGRAM(S): 100 INJECTION SUBCUTANEOUS at 17:20

## 2020-05-27 RX ADMIN — LEVETIRACETAM 500 MILLIGRAM(S): 250 TABLET, FILM COATED ORAL at 06:23

## 2020-05-27 NOTE — PROVIDER CONTACT NOTE (OTHER) - ACTION/TREATMENT ORDERED:
1 Liter NS bolus
500 bolus of normal saline
1L Bolus
Gave Oxy 10 for possible discomfort. Continue to monitor.
Give one time dose of cardizem. Will continue to monitor.
IVP Metoprolol.  Continue to assess for continued tachycardia.
Ns 250cc times 2 given . rechecked bp 103/ 64. given 2400dose of cardizem as per PA

## 2020-05-27 NOTE — PROGRESS NOTE ADULT - SUBJECTIVE AND OBJECTIVE BOX
INTERVAL HPI/OVERNIGHT EVENTS:    patient seen and examined  tolerating PEG feeds, no residuals  +bowel function         MEDICATIONS  (STANDING):  cefTRIAXone   IVPB 1000 milliGRAM(s) IV Intermittent every 24 hours  diltiazem    Tablet 90 milliGRAM(s) Oral every 6 hours  doxazosin 2 milliGRAM(s) Oral at bedtime  enoxaparin Injectable 40 milliGRAM(s) SubCutaneous <User Schedule>  glucagon  Injectable 3 milliGRAM(s) IV Push once  levETIRAcetam  Solution 500 milliGRAM(s) Oral two times a day  nystatin    Suspension 4 milliLiter(s) Oral four times a day  polyethylene glycol 3350 17 Gram(s) Oral every 12 hours  senna 2 Tablet(s) Oral at bedtime    MEDICATIONS  (PRN):  acetaminophen   Tablet .. 650 milliGRAM(s) Oral every 6 hours PRN Temp greater or equal to 38C (100.4F), Mild Pain (1 - 3), Moderate Pain (4 - 6), Severe Pain (7 - 10)  ondansetron Injectable 4 milliGRAM(s) IV Push every 6 hours PRN Nausea and/or Vomiting  sodium chloride 3%  Inhalation 3 milliLiter(s) Inhalation every 6 hours PRN secretions      Allergies    No Known Allergies    Intolerances        Review of Systems: unable to obtain         Vital Signs Last 24 Hrs  T(C): 36.6 (23 May 2020 08:00), Max: 37.1 (22 May 2020 20:00)  T(F): 97.8 (23 May 2020 08:00), Max: 98.7 (22 May 2020 20:00)  HR: 69 (23 May 2020 10:00) (63 - 107)  BP: 106/57 (23 May 2020 10:00) (88/57 - 144/59)  BP(mean): 69 (23 May 2020 10:00) (67 - 88)  RR: 13 (23 May 2020 10:00) (13 - 19)  SpO2: 96% (23 May 2020 10:00) (95% - 100%)    PHYSICAL EXAM:    Constitutional: NAD  HEENT: ncat   Cardiovascular: S1 and S2, RRR, no M  Gastrointestinal: soft, NT/ND, +PEG c/d/i  Extremities: No peripheral edema  Neurological: aphasic         LABS:                        12.6   8.14  )-----------( 164      ( 23 May 2020 05:21 )             38.0     05-23    136  |  106  |  22  ----------------------------<  105<H>  3.8   |  20<L>  |  0.62    Ca    8.5      23 May 2020 05:21        Urinalysis Basic - ( 22 May 2020 07:17 )    Color: Yellow / Appearance: Slightly Turbid / S.011 / pH: x  Gluc: x / Ketone: Negative  / Bili: Negative / Urobili: Negative   Blood: x / Protein: 100 mg/dL / Nitrite: Negative   Leuk Esterase: Large / RBC: 5 /hpf / WBC >50   Sq Epi: x / Non Sq Epi: Few / Bacteria: Many        RADIOLOGY & ADDITIONAL TESTS:

## 2020-05-27 NOTE — PHARMACOTHERAPY INTERVENTION NOTE - COMMENTS
Recommendation:  -Stop cefepime and start ciprofloxacin PO (total of 6 doses = 3 days) for UTI (P. aeruginosa)    Misty Alatorre, PharmD  #[phone] Recommendation:  -Stop cefepime and start ciprofloxacin PO (total of 6 doses = 3 days) for UTI (P. aeruginosa)  -Discontinue simvastatin 40 mg and start simvastatin 10 mg due to drug interaction with diltiazem     Misty Alatorre, PharmD  #[phone]

## 2020-05-27 NOTE — DISCHARGE NOTE PROVIDER - NSDCMRMEDTOKEN_GEN_ALL_CORE_FT
acetaminophen 325 mg oral tablet: 2 tab(s) orally every 6 hours, As needed, Temp greater or equal to 38C (100.4F), Mild Pain (1 - 3), Moderate Pain (4 - 6), Severe Pain (7 - 10)  ciprofloxacin 500 mg oral tablet: 1 tab(s) orally every 12 hours fpr 4 days  dilTIAZem 90 mg oral tablet: 1 tab(s) orally every 6 hours  doxazosin 2 mg oral tablet: 1 tab(s) orally once a day (at bedtime)  enoxaparin: 40 unit(s) subcutaneous once a day dvt ppx  levETIRAcetam 100 mg/mL oral solution: 5 milliliter(s) orally 2 times a day  polyethylene glycol 3350 oral powder for reconstitution: 17 gram(s) orally every 12 hours  senna oral tablet: 2 tab(s) orally once a day (at bedtime)  simvastatin 10 mg oral tablet: 1 tab(s) orally once a day (at bedtime)

## 2020-05-27 NOTE — DISCHARGE NOTE PROVIDER - INSTRUCTIONS
continue Jevity 1.2 @ 60mL/hr x 24 hours   -Provides: 1440mL total volume, 1728kcal, 80g protein and 1162mL free water (21kcal/kg based on dosing wt 82.2kg; 1.4g protein/kg based on IBW 58.9kg)

## 2020-05-27 NOTE — PROGRESS NOTE ADULT - SUBJECTIVE AND OBJECTIVE BOX
CC: f/u for pseudomonas uti    Patient reports  nothing not verbal  REVIEW OF SYSTEMS:  All other review of systems cannot get(Comprehensive ROS)    Antimicrobials Day #  :3/7  ciprofloxacin     Tablet 500 milliGRAM(s) Oral every 12 hours    Other Medications Reviewed    T(F): 98.5 (05-27-20 @ 08:05), Max: 99 (05-26-20 @ 22:00)  HR: 103 (05-27-20 @ 12:00)  BP: 113/95 (05-27-20 @ 12:00)  RR: 16 (05-27-20 @ 12:00)  SpO2: 98% (05-27-20 @ 12:00)  Wt(kg): --    PHYSICAL EXAM:  General: awake no acute distress  Eyes:  anicteric, no conjunctival injection, no discharge  Oropharynx: no lesions or injection 	  Neck: supple, without adenopathy  Lungs: grossly  clear to auscultation  Heart: regular rate and rhythm; no murmur, rubs or gallops  Abdomen: soft, nondistended, nontender, without mass or organomegaly  Skin: no lesions  Extremities: no clubbing, cyanosis, or edema  Neurologic: awake moves left  extremities    LAB RESULTS:                        10.4   4.69  )-----------( 178      ( 27 May 2020 06:28 )             32.2     05-27    142  |  109<H>  |  14  ----------------------------<  94  3.7   |  23  |  0.47<L>    Ca    8.7      27 May 2020 06:28          MICROBIOLOGY:  RECENT CULTURES:  05-23 @ 07:04 .Urine Clean Catch (Midstream) Pseudomonas aeruginosa    >100,000 CFU/ml Pseudomonas aeruginosa          RADIOLOGY REVIEWED:    < from: CT Head No Cont (05.11.20 @ 10:27) >  EXAM:  CT BRAIN                            PROCEDURE DATE:  05/11/2020            INTERPRETATION:    CLINICAL INDICATION: Post drainage of left frontal and basal ganglia parenchymal hemorrhage    5mm axial sections of the brain were obtained from base to vertex, without the intravenous administration of contrast material. Coronal and sagittal computer generated reconstructed views are available.    Comparison is made with the prior CT of 5/10/2020 no significant interval change is identified.    There is residual hemorrhage (6.5 cm AP diameter x 2.5 cm transversely x 3.6 cm craniocaudal) identified in the left basal ganglia and frontal parenchymal with a few foci of air after drainage. There is vasogenic edema and mass effect on the left lateral ventricle with mild midline shift to the right which is unchanged. Small vessel white matter ischemic changes are noted. A small amount of subdural air is identified beneath the frontal calvarium.    Left parietal nallely hole unchanged.      IMPRESSION: No change in moderate residual parenchymal hemorrhage in the left frontal parenchyma and basal ganglia compared with 5/10/2020. No change in mass effect.        < end of copied text >            Assessment:  patient with left iph basal ganglia, found to have pyuria with pseudomonas uti  Plan:  4 more days of cipro

## 2020-05-27 NOTE — DISCHARGE NOTE PROVIDER - NSDCCPCAREPLAN_GEN_ALL_CORE_FT
PRINCIPAL DISCHARGE DIAGNOSIS  Diagnosis: Intraparenchymal hemorrhage of brain  Assessment and Plan of Treatment: Please follow up with neurologist in 1 week. The office will call you to schedule an appointment, if you do not hear from them please call [phone]. Continue taking medications as prescribed. If you were prescribed a statin for your cholesterol please make sure you have your liver enzymes checked with your primary care physician. Monitor your blood pressure. Reduce fat, cholesterol and salt in your diet. Increase intake of fruits and vegetables. Limit alcohol to minimum and do not smoke. You may be at risk for falling, make changes to your home to help you walk easier. Keep up to date on vaccinations.  If you experience any symptoms of facial drooping, slurred speech, arm or leg weakness, severe headache, vision changes or any worsening symptoms, notify provider immediately and return to ER.

## 2020-05-27 NOTE — DISCHARGE NOTE PROVIDER - PROVIDER TOKENS
PROVIDER:[TOKEN:[[id number]],FOLLOWUP:[1 week]],PROVIDER:[TOKEN:[[id number]],FOLLOWUP:[2 weeks]],PROVIDER:[TOKEN:[[id number]],FOLLOWUP:[2 weeks]]

## 2020-05-27 NOTE — PROGRESS NOTE ADULT - ASSESSMENT
aflutter    had pauses   off dig and BB  on cardizem with episodes of tachycardia  fu with EP   off a/c due to SAH

## 2020-05-27 NOTE — PROGRESS NOTE ADULT - SUBJECTIVE AND OBJECTIVE BOX
THE PATIENT WAS SEEN AND EXAMINED BY ME WITH THE HOUSESTAFF AND STROKE TEAM DURING MORNING ROUNDS.   HPI:  87F w/ PMH of HTN, HLD, transferred from [facility] for NSG eval after found to have large L. BG IPH.   87F w/ PMH of HTN, HLD, transferred from [facility] for NSG eval after found to have large L. BG IPH. Patient found to have severe headache with subsequent syncopal episode. Found to be minimally responsive and so initially intubated for airway protection. Was hypertensive,  started on Caredene and prop gtt. Cardene discontinue prior to transfer. Upon arrival, with sedation held she was unable to open her eyes, not following commands, moving spontaneously on the L, RUE loc, RLE WDs. After extensive discussion with the family regarding goals of care, she was brought to the operating room for emergent clot evacuation.    SUBJECTIVE: No events overnight.  No new neurologic complaints.      acetaminophen   Tablet .. 650 milliGRAM(s) Oral every 6 hours PRN  cefepime   IVPB      cefepime   IVPB 1000 milliGRAM(s) IV Intermittent every 12 hours  diltiazem    Tablet 90 milliGRAM(s) Oral every 6 hours  doxazosin 2 milliGRAM(s) Oral at bedtime  enoxaparin Injectable 40 milliGRAM(s) SubCutaneous <User Schedule>  levETIRAcetam  Solution 500 milliGRAM(s) Oral two times a day  ondansetron Injectable 4 milliGRAM(s) IV Push every 6 hours PRN  polyethylene glycol 3350 17 Gram(s) Oral every 12 hours  senna 2 Tablet(s) Oral at bedtime  sodium chloride 3%  Inhalation 3 milliLiter(s) Inhalation every 6 hours PRN      PHYSICAL EXAM:   Vital Signs Last 24 Hrs  T(C): 36.9 (27 May 2020 08:05), Max: 37.2 (26 May 2020 16:00)  T(F): 98.5 (27 May 2020 08:05), Max: 99 (26 May 2020 22:00)  HR: 110 (27 May 2020 08:00) (67 - 127)  BP: 120/55 (27 May 2020 08:00) (105/61 - 149/89)  BP(mean): 76 (27 May 2020 08:00) (62 - 104)  RR: 13 (27 May 2020 08:00) (10 - 20)  SpO2: 97% (27 May 2020 08:00) (89% - 98%)    General: No acute distress  HEENT: eyes open, right gaze palsy , R HHA  Abdomen: Soft, nontender, nondistended   Extremities: No edema    NEUROLOGICAL EXAM:  Mental status: Awake, eyes open and somewhat  attentive, not following commands, some unintelligible speech   Cranial Nerves: right facial droop, right gaze palsy, right homonymous hemianopia to threat  Motor exam: left arm moves spontaneously against gravity  , LLE with some flexion, right hemiplegia RUE/RLE 0/5.  Sensation: decreased on right   Coordination/ Gait: gait not assessed    LABS:                        10.4   4.69  )-----------( 178      ( 27 May 2020 06:28 )             32.2    05-27    142  |  109<H>  |  14  ----------------------------<  94  3.7   |  23  |  0.47<L>    Ca    8.7      27 May 2020 06:28          IMAGING: Reviewed by me.     CT Head No Cont (05.11.20)  No change in moderate residual parenchymal hemorrhage in the left frontal parenchyma and basal ganglia compared with 5/10/2020. No change in mass effect.    (05.09.20)  NONCONTRAST CT BRAIN: Large intraparenchymal hemorrhage involving the left basal ganglia, frontal lobe and medial temporal lobe as described above. Near complete effacement of the left lateral ventricle. There is 0.4 cm of rightward midline shift.    CTA BRAIN AND NECK: Patent cervical circulation.  No identified aneurysm or AVM. Decreased size of the left middle cerebral artery may be due to distal branch occlusion or compression by the intraparenchymal hemorrhage. THE PATIENT WAS SEEN AND EXAMINED BY ME WITH THE HOUSESTAFF AND STROKE TEAM DURING MORNING ROUNDS.   HPI:     87F w/ PMH of HTN, HLD, transferred from [facility] for NSG eval after found to have large L. BG IPH. Patient found to have severe headache with subsequent syncopal episode. Found to be minimally responsive and so initially intubated for airway protection. Was hypertensive,  started on Caredene and prop gtt. Cardene discontinue prior to transfer. Upon arrival, with sedation held she was unable to open her eyes, not following commands, moving spontaneously on the L, RUE loc, RLE WDs. After extensive discussion with the family regarding goals of care, she was brought to the operating room for emergent clot evacuation.    SUBJECTIVE: No events overnight.  No new neurologic complaints.      acetaminophen   Tablet .. 650 milliGRAM(s) Oral every 6 hours PRN  cefepime   IVPB      cefepime   IVPB 1000 milliGRAM(s) IV Intermittent every 12 hours  diltiazem    Tablet 90 milliGRAM(s) Oral every 6 hours  doxazosin 2 milliGRAM(s) Oral at bedtime  enoxaparin Injectable 40 milliGRAM(s) SubCutaneous <User Schedule>  levETIRAcetam  Solution 500 milliGRAM(s) Oral two times a day  ondansetron Injectable 4 milliGRAM(s) IV Push every 6 hours PRN  polyethylene glycol 3350 17 Gram(s) Oral every 12 hours  senna 2 Tablet(s) Oral at bedtime  sodium chloride 3%  Inhalation 3 milliLiter(s) Inhalation every 6 hours PRN      PHYSICAL EXAM:   Vital Signs Last 24 Hrs  T(C): 36.9 (27 May 2020 08:05), Max: 37.2 (26 May 2020 16:00)  T(F): 98.5 (27 May 2020 08:05), Max: 99 (26 May 2020 22:00)  HR: 110 (27 May 2020 08:00) (67 - 127)  BP: 120/55 (27 May 2020 08:00) (105/61 - 149/89)  BP(mean): 76 (27 May 2020 08:00) (62 - 104)  RR: 13 (27 May 2020 08:00) (10 - 20)  SpO2: 97% (27 May 2020 08:00) (89% - 98%)    General: No acute distress  HEENT: eyes open, right gaze palsy , R HHA  Abdomen: Soft, nontender, nondistended   Extremities: No edema    NEUROLOGICAL EXAM:  Mental status: Awake, eyes open and somewhat  attentive, not following commands, some unintelligible speech   Cranial Nerves: right facial droop, right gaze palsy, right homonymous hemianopia to threat  Motor exam: left arm moves spontaneously against gravity  , LLE with some flexion, right hemiplegia RUE/RLE 0/5.  Sensation: decreased on right   Coordination/ Gait: gait not assessed    LABS:                        10.4   4.69  )-----------( 178      ( 27 May 2020 06:28 )             32.2    05-27    142  |  109<H>  |  14  ----------------------------<  94  3.7   |  23  |  0.47<L>    Ca    8.7      27 May 2020 06:28          IMAGING: Reviewed by me.     CT Head No Cont (05.11.20)  No change in moderate residual parenchymal hemorrhage in the left frontal parenchyma and basal ganglia compared with 5/10/2020. No change in mass effect.    (05.09.20)  NONCONTRAST CT BRAIN: Large intraparenchymal hemorrhage involving the left basal ganglia, frontal lobe and medial temporal lobe as described above. Near complete effacement of the left lateral ventricle. There is 0.4 cm of rightward midline shift.    CTA BRAIN AND NECK: Patent cervical circulation.  No identified aneurysm or AVM. Decreased size of the left middle cerebral artery may be due to distal branch occlusion or compression by the intraparenchymal hemorrhage.

## 2020-05-27 NOTE — DISCHARGE NOTE PROVIDER - CARE PROVIDER_API CALL
Laly Ho (NP; RN)  NP in Family Health  [address], [address]  [place] [address]  Phone: [phone]  Fax: [phone]  Follow Up Time: 1 week    Jose Antonio Nix  CARDIOVASCULAR DISEASE  [address]  [place] [address]  Phone: [phone]  Fax: [phone]  Follow Up Time: 2 weeks    Lars Rea; PhD)  Cardiac Electrophysiology; Cardiovascular Disease; Internal Medicine  [facility]  Dept of Cardiology, [address] [place] [id number]  Phone: [phone]  Fax: [phone]  Follow Up Time: 2 weeks

## 2020-05-27 NOTE — PROGRESS NOTE ADULT - PROBLEM SELECTOR PLAN 1
s/p PEG   cont peg feeds as tolerated   aspiration precautions  daily peg care  monitor GI function  no GI objection to d/c

## 2020-05-27 NOTE — DISCHARGE NOTE PROVIDER - NSDCFUADDAPPT_GEN_ALL_CORE_FT
You will need to have a watchman device in future to prevent atrial fibrillation. Followup with Cardiologist after rehab.

## 2020-05-27 NOTE — DISCHARGE NOTE PROVIDER - HOSPITAL COURSE
7F with PMHx of HTN, HLD, presented with large left frontal/basal ganglia IPH, etiology unknown, possibility of underlying hypertension S/P larry clot removal. Now with PAF/A flutter.         Impression: Left frontal/basal ganglia IPH likely due to  HTN, new onset atrial fibrillation.         CT Head No Cont (05.11.20)    No change in moderate residual parenchymal hemorrhage in the left frontal parenchyma and basal ganglia compared with 5/10/2020. No change in mass effect.        (05.09.20)    NONCONTRAST CT BRAIN: Large intraparenchymal hemorrhage involving the left basal ganglia, frontal lobe and medial temporal lobe as described above. Near complete effacement of the left lateral ventricle. There is 0.4 cm of rightward midline shift.        CTA BRAIN AND NECK: Patent cervical circulation.  No identified aneurysm or AVM. Decreased size of the left middle cerebral artery may be due to distal branch occlusion or compression by the intraparenchymal hemorrhage.         TTE: ef 65-70%, moderate AS, mild AR, LV remodeling, moderate stage II diastolic dysfunction borderline pulmonary htn , cardiac monitoring on 05/24: showed episodes of bradycardia and pauses  EP re-consulted: no need for PPM. Cardiology, Dr. Nix followed throughout admission.        UA/Cysitis: Urine cultured showed Pseudomonas: completed 3 days of IV Cefepime, changed to Cipro 500mg BID. ID consulted.         PT/OT recommended GINA. 7F with PMHx of HTN, HLD, presented with large left frontal/basal ganglia IPH, etiology unknown, possibility of underlying hypertension S/P larry clot removal. Now with PAF/A flutter.         Impression: Left frontal/basal ganglia IPH likely due to  HTN, new onset atrial fibrillation.         CT Head No Cont (05.11.20)    No change in moderate residual parenchymal hemorrhage in the left frontal parenchyma and basal ganglia compared with 5/10/2020. No change in mass effect.        (05.09.20)    NONCONTRAST CT BRAIN: Large intraparenchymal hemorrhage involving the left basal ganglia, frontal lobe and medial temporal lobe as described above. Near complete effacement of the left lateral ventricle. There is 0.4 cm of rightward midline shift.        CTA BRAIN AND NECK: Patent cervical circulation.  No identified aneurysm or AVM. Decreased size of the left middle cerebral artery may be due to distal branch occlusion or compression by the intraparenchymal hemorrhage.        TTE: ef 65-70%, moderate AS, mild AR, LV remodeling, moderate stage II diastolic dysfunction borderline pulmonary htn , cardiac monitoring on 05/24: showed episodes of bradycardia and pauses  EP re-consulted: no need for PPM. Cardiology, Dr. Nix followed throughout admission.        ANTITHROMBOTIC THERAPY: none at this time in setting of ICH and increased risk for bleeding, pending outpatient follow up- 4 to 6 weeks 6/6-6/20/2020, repeat CT Head before starting antithrombotic Rx, clinical course and radiological stability will need to determine timeline for initiation of AC vs. Watchman in setting of atrial fibrillation.        UA/Cysitis: Urine cultured showed Pseudomonas: completed 3 days of IV Cefepime, changed to Cipro 500mg BID. ID consulted.         PT/OT recommended GINA. 7F with PMHx of HTN, HLD, presented with large left frontal/basal ganglia IPH, etiology unknown, possibility of underlying hypertension S/P larry clot removal. Now with PAF/A flutter.         Impression: Left frontal/basal ganglia IPH with  cerebral edema, mass effect, and brain compression likely due to  HTN, new onset atrial fibrillation.         CT Head No Cont (05.11.20)    No change in moderate residual parenchymal hemorrhage in the left frontal parenchyma and basal ganglia compared with 5/10/2020. No change in mass effect.        (05.09.20)    NONCONTRAST CT BRAIN: Large intraparenchymal hemorrhage involving the left basal ganglia, frontal lobe and medial temporal lobe as described above. Near complete effacement of the left lateral ventricle. There is 0.4 cm of rightward midline shift.        CTA BRAIN AND NECK: Patent cervical circulation.  No identified aneurysm or AVM. Decreased size of the left middle cerebral artery may be due to distal branch occlusion or compression by the intraparenchymal hemorrhage.        TTE: ef 65-70%, moderate AS, mild AR, LV remodeling, moderate stage II diastolic dysfunction borderline pulmonary htn , cardiac monitoring on 05/24: showed episodes of bradycardia and pauses  EP re-consulted: no need for PPM. Cardiology, Dr. Nix followed throughout admission.        ANTITHROMBOTIC THERAPY: none at this time in setting of ICH and increased risk for bleeding, pending outpatient follow up- 4 to 6 weeks 6/6-6/20/2020, repeat CT Head before starting antithrombotic Rx, clinical course and radiological stability will need to determine timeline for initiation of AC vs. Watchman in setting of atrial fibrillation.        UA/Cysitis: Urine cultured showed Pseudomonas: completed 3 days of IV Cefepime, changed to Cipro 500mg BID. ID consulted.         PT/OT recommended GINA.

## 2020-05-27 NOTE — PROGRESS NOTE ADULT - SUBJECTIVE AND OBJECTIVE BOX
Subjective: Patient seen and examined. No new events except as noted.     SUBJECTIVE/ROS:        MEDICATIONS:  MEDICATIONS  (STANDING):  cefepime   IVPB      cefepime   IVPB 1000 milliGRAM(s) IV Intermittent every 12 hours  diltiazem    Tablet 90 milliGRAM(s) Oral every 6 hours  doxazosin 2 milliGRAM(s) Oral at bedtime  enoxaparin Injectable 40 milliGRAM(s) SubCutaneous <User Schedule>  levETIRAcetam  Solution 500 milliGRAM(s) Oral two times a day  polyethylene glycol 3350 17 Gram(s) Oral every 12 hours  senna 2 Tablet(s) Oral at bedtime      PHYSICAL EXAM:  T(C): 36.9 (05-27-20 @ 08:05), Max: 37.2 (05-26-20 @ 16:00)  HR: 110 (05-27-20 @ 08:00) (67 - 127)  BP: 120/55 (05-27-20 @ 08:00) (105/61 - 149/89)  RR: 13 (05-27-20 @ 08:00) (10 - 20)  SpO2: 97% (05-27-20 @ 08:00) (89% - 98%)  Wt(kg): --  I&O's Summary    26 May 2020 07:01  -  27 May 2020 07:00  --------------------------------------------------------  IN: 780 mL / OUT: 2000 mL / NET: -1220 mL            JVP: Normal  Neck: supple  Lung: clear   CV: S1 S2 , Murmur:  Abd: soft  Ext: No edema  neuro: Awake  Psych: flat affect  Skin: normal``    LABS/DATA:    CARDIAC MARKERS:                                10.4   4.69  )-----------( 178      ( 27 May 2020 06:28 )             32.2     05-27    142  |  109<H>  |  14  ----------------------------<  94  3.7   |  23  |  0.47<L>    Ca    8.7      27 May 2020 06:28      proBNP:   Lipid Profile:   HgA1c:   TSH:     TELE:  EKG:

## 2020-05-27 NOTE — PROVIDER CONTACT NOTE (OTHER) - ASSESSMENT
Patient neurologically stable at this time. Patient shows no signs of discomfort and is sitting in chair at this time; asymptomatic

## 2020-05-27 NOTE — PROGRESS NOTE ADULT - ASSESSMENT
87F with PMHx of HTN, HLD, presented with large left frontal/basal ganglia IPH, etiology unknown, possibility of underlying hypertension S/P larry clot removal. Now with PAF/A flutter.     Impression: Left frontal/basal ganglia IPH likely due to  HTN, new onset atrial fibrillation.     NEURO: neurologically without acute change, continue close monitoring for neurologic deterioration as patient with cerebral edema, mass effect, and brain compression, she remains bedbound with immobility due to frailty and functional quadriplegia, maintain normotension, home statin therapy if applicable, MR brain upon resolution of hemorrhage for further evaluation, PMR: GINA.    ANTITHROMBOTIC THERAPY: none at this time in setting of ICH and increased risk for bleeding, pending outpatient follow up- 4 to 6 weeks, repeat CT Head before starting antithrombotic Rx, clinical course and radiological stability will need to determine timeline for initiation of AC vs. Watchman in setting of atrial fibrillation.     PULMONARY: CXR on 5/11 shows enlarged heart, lungs clear, protecting airway, saturating well     CARDIOVASCULAR:  TTE: ef 65-70%, moderate AS, mild AR, LV remodeling, moderate stage II diastolic dysfunction borderline pulmonary htn , cardiac monitoring on 05/24: showed episodes of bradycardia and pauses  EP re-consulted for further recommendations. Cardiology consulted with Dr. Boyd velázquez.       SBP goal: <140mMHg    GASTROINTESTINAL:  dysphagia screen failed, . SLP evaluated patient on 5/20, recommend MBS, failed MBS, GI consulted: s/p PEG on 5/22- tolerating TFs. Nutrition consulted.     Diet: NPO with TF via PEG    RENAL: BUN/Cr without acute change, good urine output, maintain adequate hydration     Na Goal: Greater than 135     Garcia: n    HEMATOLOGY: H/H without acute change, no active bleeding, Platelets 178     DVT ppx: Heparin s.c [] LMWH [x]     ID: afebrile, no leukocytosis, UA showed UTI- on cefepime for pseudomonas which demonstrates sensitivity to    Other: Called family , discussed plan and answered all questions per team.     DISPOSITION: GINA      CORE MEASURES:        Admission NIHSS:      TPA: [] YES [x] NO      LDL/HDL:100/50     Depression Screen: NA     Statin Therapy: as noted     Dysphagia Screen: [] PASS [x] FAIL     Smoking [] YES [x] NO      Afib [x] YES [] NO     Stroke Education [x] YES [] NO    Obtain screening lower extremity venous ultrasound in patients who meet 1 or more of the following criteria as patient is high risk for DVT/PE on admission:   [] History of DVT/PE  []Hypercoagulable states (Factor V Leiden, Cancer, OCP, etc. )  []Prolonged immobility (hemiplegia/hemiparesis/post operative or any other extended immobilization)  [] Transferred from outside facility (Rehab or Long term care)  [] Age </= to 50 87F with PMHx of HTN, HLD, presented with large left frontal/basal ganglia IPH, etiology unknown, possibility of underlying hypertension S/P larry clot removal. Now with PAF/A flutter.     Impression: Left frontal/basal ganglia IPH likely due to  HTN, new onset atrial fibrillation.     NEURO: neurologically without acute change- more awake and interactive today, continue close monitoring for neurologic deterioration as patient with cerebral edema, mass effect, and brain compression, she remains bedbound with immobility due to frailty and functional quadriplegia, maintain normotension, home statin therapy if applicable, MR brain upon resolution of hemorrhage for further evaluation, PMR: GINA.    ANTITHROMBOTIC THERAPY: none at this time in setting of ICH and increased risk for bleeding, pending outpatient follow up- 4 to 6 weeks, repeat CT Head before starting antithrombotic Rx, clinical course and radiological stability will need to determine timeline for initiation of AC vs. Watchman in setting of atrial fibrillation.     PULMONARY: CXR on 5/11 shows enlarged heart, lungs clear, protecting airway, saturating well     CARDIOVASCULAR:  TTE: ef 65-70%, moderate AS, mild AR, LV remodeling, moderate stage II diastolic dysfunction borderline pulmonary htn , cardiac monitoring on 05/24: showed episodes of bradycardia and pauses  EP re-consulted for further recommendations. Cardiology consulted with Dr. Boyd velázquez.       SBP goal: <140mMHg    GASTROINTESTINAL:  dysphagia screen failed, . SLP evaluated patient on 5/20, recommend MBS, failed MBS, GI consulted: s/p PEG on 5/22- tolerating TFs. Nutrition consulted.     Diet: NPO with TF via PEG    RENAL: BUN/Cr without acute change, good urine output, maintain adequate hydration     Na Goal: Greater than 135     Garcia: n    HEMATOLOGY: H/H without acute change, no active bleeding, Platelets 178     DVT ppx: Heparin s.c [] LMWH [x]     ID: afebrile, no leukocytosis, UA showed UTI- on cefepime for pseudomonas which demonstrates sensitivity. Transition to PEG regimen upon discharge.     Other: Called family , discussed plan and answered all questions per team.     DISPOSITION: GINA      CORE MEASURES:        Admission NIHSS:      TPA: [] YES [x] NO      LDL/HDL:100/50     Depression Screen: NA     Statin Therapy: as noted     Dysphagia Screen: [] PASS [x] FAIL     Smoking [] YES [x] NO      Afib [x] YES [] NO     Stroke Education [x] YES [] NO    Obtain screening lower extremity venous ultrasound in patients who meet 1 or more of the following criteria as patient is high risk for DVT/PE on admission:   [] History of DVT/PE  []Hypercoagulable states (Factor V Leiden, Cancer, OCP, etc. )  []Prolonged immobility (hemiplegia/hemiparesis/post operative or any other extended immobilization)  [] Transferred from outside facility (Rehab or Long term care)  [] Age </= to 50

## 2020-05-28 ENCOUNTER — TRANSCRIPTION ENCOUNTER (OUTPATIENT)
Age: 85
End: 2020-05-28

## 2020-05-28 VITALS — SYSTOLIC BLOOD PRESSURE: 102 MMHG | HEART RATE: 82 BPM | DIASTOLIC BLOOD PRESSURE: 51 MMHG | OXYGEN SATURATION: 97 %

## 2020-05-28 LAB
ANION GAP SERPL CALC-SCNC: 14 MMOL/L — SIGNIFICANT CHANGE UP (ref 5–17)
BUN SERPL-MCNC: 20 MG/DL — SIGNIFICANT CHANGE UP (ref 7–23)
CALCIUM SERPL-MCNC: 9.7 MG/DL — SIGNIFICANT CHANGE UP (ref 8.4–10.5)
CHLORIDE SERPL-SCNC: 103 MMOL/L — SIGNIFICANT CHANGE UP (ref 96–108)
CO2 SERPL-SCNC: 24 MMOL/L — SIGNIFICANT CHANGE UP (ref 22–31)
CREAT SERPL-MCNC: 0.56 MG/DL — SIGNIFICANT CHANGE UP (ref 0.5–1.3)
GLUCOSE SERPL-MCNC: 114 MG/DL — HIGH (ref 70–99)
HCT VFR BLD CALC: 41.1 % — SIGNIFICANT CHANGE UP (ref 34.5–45)
HGB BLD-MCNC: 13.7 G/DL — SIGNIFICANT CHANGE UP (ref 11.5–15.5)
MCHC RBC-ENTMCNC: 32.6 PG — SIGNIFICANT CHANGE UP (ref 27–34)
MCHC RBC-ENTMCNC: 33.3 GM/DL — SIGNIFICANT CHANGE UP (ref 32–36)
MCV RBC AUTO: 97.9 FL — SIGNIFICANT CHANGE UP (ref 80–100)
NRBC # BLD: 0 /100 WBCS — SIGNIFICANT CHANGE UP (ref 0–0)
PLATELET # BLD AUTO: 183 K/UL — SIGNIFICANT CHANGE UP (ref 150–400)
POTASSIUM SERPL-MCNC: 3.4 MMOL/L — LOW (ref 3.5–5.3)
POTASSIUM SERPL-SCNC: 3.4 MMOL/L — LOW (ref 3.5–5.3)
RBC # BLD: 4.2 M/UL — SIGNIFICANT CHANGE UP (ref 3.8–5.2)
RBC # FLD: 14.4 % — SIGNIFICANT CHANGE UP (ref 10.3–14.5)
SODIUM SERPL-SCNC: 141 MMOL/L — SIGNIFICANT CHANGE UP (ref 135–145)
WBC # BLD: 4.39 K/UL — SIGNIFICANT CHANGE UP (ref 3.8–10.5)
WBC # FLD AUTO: 4.39 K/UL — SIGNIFICANT CHANGE UP (ref 3.8–10.5)

## 2020-05-28 PROCEDURE — 94002 VENT MGMT INPAT INIT DAY: CPT

## 2020-05-28 PROCEDURE — 94003 VENT MGMT INPAT SUBQ DAY: CPT

## 2020-05-28 PROCEDURE — 83735 ASSAY OF MAGNESIUM: CPT

## 2020-05-28 PROCEDURE — 97760 ORTHOTIC MGMT&TRAING 1ST ENC: CPT

## 2020-05-28 PROCEDURE — 81001 URINALYSIS AUTO W/SCOPE: CPT

## 2020-05-28 PROCEDURE — 80053 COMPREHEN METABOLIC PANEL: CPT

## 2020-05-28 PROCEDURE — C1769: CPT

## 2020-05-28 PROCEDURE — 80061 LIPID PANEL: CPT

## 2020-05-28 PROCEDURE — 97167 OT EVAL HIGH COMPLEX 60 MIN: CPT

## 2020-05-28 PROCEDURE — 86901 BLOOD TYPING SEROLOGIC RH(D): CPT

## 2020-05-28 PROCEDURE — U0003: CPT

## 2020-05-28 PROCEDURE — 86900 BLOOD TYPING SEROLOGIC ABO: CPT

## 2020-05-28 PROCEDURE — 84436 ASSAY OF TOTAL THYROXINE: CPT

## 2020-05-28 PROCEDURE — 80162 ASSAY OF DIGOXIN TOTAL: CPT

## 2020-05-28 PROCEDURE — 82962 GLUCOSE BLOOD TEST: CPT

## 2020-05-28 PROCEDURE — 83036 HEMOGLOBIN GLYCOSYLATED A1C: CPT

## 2020-05-28 PROCEDURE — 85014 HEMATOCRIT: CPT

## 2020-05-28 PROCEDURE — 70498 CT ANGIOGRAPHY NECK: CPT

## 2020-05-28 PROCEDURE — 86850 RBC ANTIBODY SCREEN: CPT

## 2020-05-28 PROCEDURE — 80048 BASIC METABOLIC PNL TOTAL CA: CPT

## 2020-05-28 PROCEDURE — 85610 PROTHROMBIN TIME: CPT

## 2020-05-28 PROCEDURE — 85027 COMPLETE CBC AUTOMATED: CPT

## 2020-05-28 PROCEDURE — C1889: CPT

## 2020-05-28 PROCEDURE — 83605 ASSAY OF LACTIC ACID: CPT

## 2020-05-28 PROCEDURE — 96374 THER/PROPH/DIAG INJ IV PUSH: CPT

## 2020-05-28 PROCEDURE — 74230 X-RAY XM SWLNG FUNCJ C+: CPT

## 2020-05-28 PROCEDURE — 94640 AIRWAY INHALATION TREATMENT: CPT

## 2020-05-28 PROCEDURE — 70450 CT HEAD/BRAIN W/O DYE: CPT

## 2020-05-28 PROCEDURE — 97530 THERAPEUTIC ACTIVITIES: CPT

## 2020-05-28 PROCEDURE — 84100 ASSAY OF PHOSPHORUS: CPT

## 2020-05-28 PROCEDURE — 84484 ASSAY OF TROPONIN QUANT: CPT

## 2020-05-28 PROCEDURE — C1713: CPT

## 2020-05-28 PROCEDURE — 84443 ASSAY THYROID STIM HORMONE: CPT

## 2020-05-28 PROCEDURE — 92610 EVALUATE SWALLOWING FUNCTION: CPT

## 2020-05-28 PROCEDURE — 93306 TTE W/DOPPLER COMPLETE: CPT

## 2020-05-28 PROCEDURE — 99285 EMERGENCY DEPT VISIT HI MDM: CPT | Mod: 25

## 2020-05-28 PROCEDURE — 92526 ORAL FUNCTION THERAPY: CPT

## 2020-05-28 PROCEDURE — 99233 SBSQ HOSP IP/OBS HIGH 50: CPT

## 2020-05-28 PROCEDURE — 93970 EXTREMITY STUDY: CPT

## 2020-05-28 PROCEDURE — 87086 URINE CULTURE/COLONY COUNT: CPT

## 2020-05-28 PROCEDURE — 70496 CT ANGIOGRAPHY HEAD: CPT

## 2020-05-28 PROCEDURE — L8699: CPT

## 2020-05-28 PROCEDURE — 97161 PT EVAL LOW COMPLEX 20 MIN: CPT

## 2020-05-28 PROCEDURE — 82565 ASSAY OF CREATININE: CPT

## 2020-05-28 PROCEDURE — 71045 X-RAY EXAM CHEST 1 VIEW: CPT

## 2020-05-28 PROCEDURE — 82435 ASSAY OF BLOOD CHLORIDE: CPT

## 2020-05-28 PROCEDURE — 85730 THROMBOPLASTIN TIME PARTIAL: CPT

## 2020-05-28 PROCEDURE — 80076 HEPATIC FUNCTION PANEL: CPT

## 2020-05-28 PROCEDURE — 84295 ASSAY OF SERUM SODIUM: CPT

## 2020-05-28 PROCEDURE — 82947 ASSAY GLUCOSE BLOOD QUANT: CPT

## 2020-05-28 PROCEDURE — 84132 ASSAY OF SERUM POTASSIUM: CPT

## 2020-05-28 PROCEDURE — C1757: CPT

## 2020-05-28 PROCEDURE — 84480 ASSAY TRIIODOTHYRONINE (T3): CPT

## 2020-05-28 PROCEDURE — 82330 ASSAY OF CALCIUM: CPT

## 2020-05-28 PROCEDURE — 87186 SC STD MICRODIL/AGAR DIL: CPT

## 2020-05-28 PROCEDURE — 93005 ELECTROCARDIOGRAM TRACING: CPT

## 2020-05-28 PROCEDURE — 92611 MOTION FLUOROSCOPY/SWALLOW: CPT

## 2020-05-28 PROCEDURE — 97110 THERAPEUTIC EXERCISES: CPT

## 2020-05-28 PROCEDURE — 82803 BLOOD GASES ANY COMBINATION: CPT

## 2020-05-28 RX ORDER — DILTIAZEM HCL 120 MG
1 CAPSULE, EXT RELEASE 24 HR ORAL
Qty: 0 | Refills: 0 | DISCHARGE
Start: 2020-05-28

## 2020-05-28 RX ORDER — POLYETHYLENE GLYCOL 3350 17 G/17G
17 POWDER, FOR SOLUTION ORAL
Qty: 0 | Refills: 0 | DISCHARGE
Start: 2020-05-28

## 2020-05-28 RX ORDER — CIPROFLOXACIN LACTATE 400MG/40ML
1 VIAL (ML) INTRAVENOUS
Qty: 0 | Refills: 0 | DISCHARGE
Start: 2020-05-28

## 2020-05-28 RX ORDER — SENNA PLUS 8.6 MG/1
2 TABLET ORAL
Qty: 0 | Refills: 0 | DISCHARGE
Start: 2020-05-28

## 2020-05-28 RX ORDER — ENOXAPARIN SODIUM 100 MG/ML
40 INJECTION SUBCUTANEOUS
Qty: 0 | Refills: 0 | DISCHARGE
Start: 2020-05-28

## 2020-05-28 RX ORDER — ACETAMINOPHEN 500 MG
2 TABLET ORAL
Qty: 0 | Refills: 0 | DISCHARGE
Start: 2020-05-28

## 2020-05-28 RX ORDER — LEVETIRACETAM 250 MG/1
5 TABLET, FILM COATED ORAL
Qty: 0 | Refills: 0 | DISCHARGE
Start: 2020-05-28

## 2020-05-28 RX ORDER — SIMVASTATIN 20 MG/1
1 TABLET, FILM COATED ORAL
Qty: 0 | Refills: 0 | DISCHARGE
Start: 2020-05-28

## 2020-05-28 RX ORDER — POTASSIUM CHLORIDE 20 MEQ
40 PACKET (EA) ORAL ONCE
Refills: 0 | Status: COMPLETED | OUTPATIENT
Start: 2020-05-28 | End: 2020-05-28

## 2020-05-28 RX ORDER — DOXAZOSIN MESYLATE 4 MG
1 TABLET ORAL
Qty: 0 | Refills: 0 | DISCHARGE
Start: 2020-05-28

## 2020-05-28 RX ADMIN — Medication 2 MILLIGRAM(S): at 00:13

## 2020-05-28 RX ADMIN — Medication 90 MILLIGRAM(S): at 00:13

## 2020-05-28 RX ADMIN — Medication 40 MILLIEQUIVALENT(S): at 08:22

## 2020-05-28 RX ADMIN — Medication 90 MILLIGRAM(S): at 12:10

## 2020-05-28 RX ADMIN — LEVETIRACETAM 500 MILLIGRAM(S): 250 TABLET, FILM COATED ORAL at 06:51

## 2020-05-28 RX ADMIN — Medication 90 MILLIGRAM(S): at 06:51

## 2020-05-28 RX ADMIN — SIMVASTATIN 10 MILLIGRAM(S): 20 TABLET, FILM COATED ORAL at 00:13

## 2020-05-28 RX ADMIN — Medication 500 MILLIGRAM(S): at 06:56

## 2020-05-28 NOTE — CHART NOTE - NSCHARTNOTEFT_GEN_A_CORE
Nutrition Follow Up Note    Patient seen for: stroke unit f/u    Chart reviewed, events noted. Adm for IPH, S/P clot removal. S/P PEG 5/22    Source: chart, team    Diet :  Diet, NPO with Tube Feed:   Tube Feeding Modality: Gastrostomy  Jevity 1.2 Tree (JEVITY1.2RTH)  Total Volume for 24 Hours (mL): 1440  Continuous  Starting Tube Feed Rate {mL per Hour}: 10  Increase Tube Feed Rate by (mL): 10     Every 4 hours  Until Goal Tube Feed Rate (mL per Hour): 60  Tube Feed Duration (in Hours): 24  Tube Feed Start Time: 09:00 (05-23-20 @ 07:50)     Enteral Nutrition:  provides 1728kcal, 80g protein and 1162mL free water. Average of 75% of EN goal met over past 5 days    GI: no vomiting, no abd distention  fecal incontinence 5/27    Dosing wt 5/9 82.2 kg, no recent wt      Pertinent Medications: MEDICATIONS  (STANDING):  ciprofloxacin     Tablet 500 milliGRAM(s) Oral every 12 hours  diltiazem    Tablet 90 milliGRAM(s) Oral every 6 hours  doxazosin 2 milliGRAM(s) Oral at bedtime  enoxaparin Injectable 40 milliGRAM(s) SubCutaneous <User Schedule>  levETIRAcetam  Solution 500 milliGRAM(s) Oral two times a day  polyethylene glycol 3350 17 Gram(s) Oral every 12 hours  senna 2 Tablet(s) Oral at bedtime  simvastatin 10 milliGRAM(s) Oral at bedtime    MEDICATIONS  (PRN):  acetaminophen   Tablet .. 650 milliGRAM(s) Oral every 6 hours PRN Temp greater or equal to 38C (100.4F), Mild Pain (1 - 3), Moderate Pain (4 - 6), Severe Pain (7 - 10)  ondansetron Injectable 4 milliGRAM(s) IV Push every 6 hours PRN Nausea and/or Vomiting  sodium chloride 3%  Inhalation 3 milliLiter(s) Inhalation every 6 hours PRN secretions    05-28 @ 06:17: Na 141, BUN 20, Cr 0.56, <H>, K+ 3.4<L>, Phos --, Mg --, Alk Phos --, ALT/SGPT --, AST/SGOT --, HbA1c --    Finger Sticks:      Skin per nursing documentation: no pressure injuries documented   Edema: none    Estimated Needs:  Energy: (20-25kcal/kg dosing wt 82.2kg): 8064-2160 kcal  Protein: (1.4-1.6g protein/kg IBW 58.9kg): 82-94g protein     Previous Nutrition Diagnosis: increased nutrient needs, swallowing difficulty  Nutrition Diagnosis are ongoing, addressed w/ PEG feeds    New Nutrition Diagnosis: none  Related to:   As evidenced by:      Interventions:     Recommend  1) recommend continue Jevity 1.2 @ 60mL/hr x 24 hours provides 1440mL total volume, 1728kcal, 80g protein and 1162mL free water (21kcal/kg based on dosing wt 82.2kg; 1.4g protein/kg based on IBW 58.9kg)    Monitoring and Evaluation:     Continue to monitor Nutritional intake, Tolerance to diet prescription, weights, labs, skin integrity    RD remains available upon request and will follow up per protocol

## 2020-05-28 NOTE — PROGRESS NOTE ADULT - ATTENDING COMMENTS
I have reviewed all strips and history, physical and labs with Dr. Spaulding and concur with management as outlined by. Dr. Oviedo
To consider starting ASA 81 mg upon discharge.
agree with above; ROS otherwise negative

## 2020-05-28 NOTE — PROGRESS NOTE ADULT - SUBJECTIVE AND OBJECTIVE BOX
THE PATIENT WAS SEEN AND EXAMINED BY ME WITH THE HOUSESTAFF AND STROKE TEAM DURING MORNING ROUNDS.   HPI:     87F w/ PMH of HTN, HLD, transferred from [facility] for NSG eval after found to have large L. BG IPH. Patient found to have severe headache with subsequent syncopal episode. Found to be minimally responsive and so initially intubated for airway protection. Was hypertensive,  started on Caredene and prop gtt. Cardene discontinue prior to transfer. Upon arrival, with sedation held she was unable to open her eyes, not following commands, moving spontaneously on the L, RUE loc, RLE WDs. After extensive discussion with the family regarding goals of care, she was brought to the operating room for emergent clot evacuation.    SUBJECTIVE: No events overnight.  No new neurologic complaints.      acetaminophen   Tablet .. 650 milliGRAM(s) Oral every 6 hours PRN  ciprofloxacin     Tablet 500 milliGRAM(s) Oral every 12 hours  diltiazem    Tablet 90 milliGRAM(s) Oral every 6 hours  doxazosin 2 milliGRAM(s) Oral at bedtime  enoxaparin Injectable 40 milliGRAM(s) SubCutaneous <User Schedule>  levETIRAcetam  Solution 500 milliGRAM(s) Oral two times a day  ondansetron Injectable 4 milliGRAM(s) IV Push every 6 hours PRN  polyethylene glycol 3350 17 Gram(s) Oral every 12 hours  senna 2 Tablet(s) Oral at bedtime  simvastatin 10 milliGRAM(s) Oral at bedtime  sodium chloride 3%  Inhalation 3 milliLiter(s) Inhalation every 6 hours PRN    PHYSICAL EXAM:   Vital Signs Last 24 Hrs  T(C): 36.5 (27 May 2020 20:30), Max: 37.2 (27 May 2020 16:08)  T(F): 97.7 (27 May 2020 20:30), Max: 98.9 (27 May 2020 16:08)  HR: 91 (28 May 2020 06:00) (63 - 125)  BP: 119/60 (28 May 2020 06:00) (102/55 - 137/87)  BP(mean): 77 (28 May 2020 06:00) (71 - 103)  RR: 14 (28 May 2020 06:00) (11 - 17)  SpO2: 98% (28 May 2020 06:00) (95% - 99%)    General: No acute distress  HEENT: eyes open, right gaze palsy , R HHA  Abdomen: Soft, nontender, nondistended   Extremities: No edema    NEUROLOGICAL EXAM:  Mental status: Awake, eyes open and somewhat  attentive, not following commands, some unintelligible speech   Cranial Nerves: right facial droop, right gaze palsy, right homonymous hemianopia to threat  Motor exam: left arm moves spontaneously against gravity  , LLE with some flexion, right hemiplegia RUE/RLE 0/5.  Sensation: decreased on right   Coordination/ Gait: gait not assessed    LABS:                        13.7   4.39  )-----------( 183      ( 28 May 2020 06:17 )             41.1    05-28    141  |  103  |  20  ----------------------------<  114<H>  3.4<L>   |  24  |  0.56    Ca    9.7      28 May 2020     IMAGING: Reviewed by me.     CT Head No Cont (05.11.20)  No change in moderate residual parenchymal hemorrhage in the left frontal parenchyma and basal ganglia compared with 5/10/2020. No change in mass effect.    (05.09.20)  NONCONTRAST CT BRAIN: Large intraparenchymal hemorrhage involving the left basal ganglia, frontal lobe and medial temporal lobe as described above. Near complete effacement of the left lateral ventricle. There is 0.4 cm of rightward midline shift.    CTA BRAIN AND NECK: Patent cervical circulation.  No identified aneurysm or AVM. Decreased size of the left middle cerebral artery may be due to distal branch occlusion or compression by the intraparenchymal hemorrhage. THE PATIENT WAS SEEN AND EXAMINED BY ME WITH THE HOUSESTAFF AND STROKE TEAM DURING MORNING ROUNDS.   HPI:     87F w/ PMH of HTN, HLD, transferred from [facility] for NSG eval after found to have large L. BG IPH. Patient found to have severe headache with subsequent syncopal episode. Found to be minimally responsive and so initially intubated for airway protection. Was hypertensive,  started on Caredene and prop gtt. Cardene discontinue prior to transfer. Upon arrival, with sedation held she was unable to open her eyes, not following commands, moving spontaneously on the L, RUE loc, RLE WDs. After extensive discussion with the family regarding goals of care, she was brought to the operating room for emergent clot evacuation.    SUBJECTIVE: No events overnight.  No new neurologic complaints.      acetaminophen   Tablet .. 650 milliGRAM(s) Oral every 6 hours PRN  ciprofloxacin     Tablet 500 milliGRAM(s) Oral every 12 hours  diltiazem    Tablet 90 milliGRAM(s) Oral every 6 hours  doxazosin 2 milliGRAM(s) Oral at bedtime  enoxaparin Injectable 40 milliGRAM(s) SubCutaneous <User Schedule>  levETIRAcetam  Solution 500 milliGRAM(s) Oral two times a day  ondansetron Injectable 4 milliGRAM(s) IV Push every 6 hours PRN  polyethylene glycol 3350 17 Gram(s) Oral every 12 hours  senna 2 Tablet(s) Oral at bedtime  simvastatin 10 milliGRAM(s) Oral at bedtime  sodium chloride 3%  Inhalation 3 milliLiter(s) Inhalation every 6 hours PRN    PHYSICAL EXAM:   Vital Signs Last 24 Hrs  T(C): 36.5 (27 May 2020 20:30), Max: 37.2 (27 May 2020 16:08)  T(F): 97.7 (27 May 2020 20:30), Max: 98.9 (27 May 2020 16:08)  HR: 91 (28 May 2020 06:00) (63 - 125)  BP: 119/60 (28 May 2020 06:00) (102/55 - 137/87)  BP(mean): 77 (28 May 2020 06:00) (71 - 103)  RR: 14 (28 May 2020 06:00) (11 - 17)  SpO2: 98% (28 May 2020 06:00) (95% - 99%)    General: No acute distress  HEENT: eyes open, right gaze palsy , R HHA  Abdomen: Soft, nontender, nondistended   Extremities: No edema    NEUROLOGICAL EXAM:  Mental status: Awake, eyes open and somewhat attentive, not following commands, some unintelligible speech   Cranial Nerves: right facial droop, right gaze palsy, right homonymous hemianopia to threat  Motor exam: left arm moves spontaneously against gravity, LLE with some flexion, right hemiplegia RUE/RLE 0/5.  Sensation: decreased on right   Coordination/ Gait: gait not assessed    LABS:                        13.7   4.39  )-----------( 183      ( 28 May 2020 06:17 )             41.1    05-28    141  |  103  |  20  ----------------------------<  114<H>  3.4<L>   |  24  |  0.56    Ca    9.7      28 May 2020     IMAGING: Reviewed by me.     CT Head No Cont (05.11.20)  No change in moderate residual parenchymal hemorrhage in the left frontal parenchyma and basal ganglia compared with 5/10/2020. No change in mass effect.    (05.09.20)  NONCONTRAST CT BRAIN: Large intraparenchymal hemorrhage involving the left basal ganglia, frontal lobe and medial temporal lobe as described above. Near complete effacement of the left lateral ventricle. There is 0.4 cm of rightward midline shift.    CTA BRAIN AND NECK: Patent cervical circulation.  No identified aneurysm or AVM. Decreased size of the left middle cerebral artery may be due to distal branch occlusion or compression by the intraparenchymal hemorrhage. THE PATIENT WAS SEEN AND EXAMINED BY ME WITH THE HOUSESTAFF AND STROKE TEAM DURING MORNING ROUNDS.   HPI:     87F w/ PMH of HTN, HLD, transferred from [facility] for NSG eval after found to have large L. BG IPH. Patient found to have severe headache with subsequent syncopal episode. Found to be minimally responsive and so initially intubated for airway protection. Was hypertensive,  started on Caredene and prop gtt. Cardene discontinue prior to transfer. Upon arrival, with sedation held she was unable to open her eyes, not following commands, moving spontaneously on the L, RUE loc, RLE WDs. After extensive discussion with the family regarding goals of care, she was brought to the operating room for emergent clot evacuation.    SUBJECTIVE: No events overnight.  No new neurologic complaints.      acetaminophen   Tablet .. 650 milliGRAM(s) Oral every 6 hours PRN  ciprofloxacin     Tablet 500 milliGRAM(s) Oral every 12 hours  diltiazem    Tablet 90 milliGRAM(s) Oral every 6 hours  doxazosin 2 milliGRAM(s) Oral at bedtime  enoxaparin Injectable 40 milliGRAM(s) SubCutaneous <User Schedule>  levETIRAcetam  Solution 500 milliGRAM(s) Oral two times a day  ondansetron Injectable 4 milliGRAM(s) IV Push every 6 hours PRN  polyethylene glycol 3350 17 Gram(s) Oral every 12 hours  senna 2 Tablet(s) Oral at bedtime  simvastatin 10 milliGRAM(s) Oral at bedtime  sodium chloride 3%  Inhalation 3 milliLiter(s) Inhalation every 6 hours PRN    PHYSICAL EXAM:   Vital Signs Last 24 Hrs  T(C): 36.5 (27 May 2020 20:30), Max: 37.2 (27 May 2020 16:08)  T(F): 97.7 (27 May 2020 20:30), Max: 98.9 (27 May 2020 16:08)  HR: 91 (28 May 2020 06:00) (63 - 125)  BP: 119/60 (28 May 2020 06:00) (102/55 - 137/87)  BP(mean): 77 (28 May 2020 06:00) (71 - 103)  RR: 14 (28 May 2020 06:00) (11 - 17)  SpO2: 98% (28 May 2020 06:00) (95% - 99%)    General: No acute distress  HEENT: eyes open, right gaze palsy , R HHA  Abdomen: Soft, nontender, nondistended   Extremities: No edema    NEUROLOGICAL EXAM:  Mental status: Awake, eyes open and somewhat attentive, with probable mild right hemineglect; not following commands, some unintelligible speech   Cranial Nerves: right facial droop, right gaze palsy, right homonymous hemianopia to threat  Motor exam: left arm moves spontaneously against gravity, LLE with some flexion, right hemiplegia RUE/RLE 0/5.  Sensation: decreased on right   Coordination/ Gait: gait not assessed    LABS:                        13.7   4.39  )-----------( 183      ( 28 May 2020 06:17 )             41.1    05-28    141  |  103  |  20  ----------------------------<  114<H>  3.4<L>   |  24  |  0.56    Ca    9.7      28 May 2020     IMAGING: Reviewed by me.     CT Head No Cont (05.11.20)  No change in moderate residual parenchymal hemorrhage in the left frontal parenchyma and basal ganglia compared with 5/10/2020. No change in mass effect.    (05.09.20)  NONCONTRAST CT BRAIN: Large intraparenchymal hemorrhage involving the left basal ganglia, frontal lobe and medial temporal lobe as described above. Near complete effacement of the left lateral ventricle. There is 0.4 cm of rightward midline shift.    CTA BRAIN AND NECK: Patent cervical circulation.  No identified aneurysm or AVM. Decreased size of the left middle cerebral artery may be due to distal branch occlusion or compression by the intraparenchymal hemorrhage.

## 2020-05-28 NOTE — PROGRESS NOTE ADULT - REASON FOR ADMISSION
IPH
Large left basal ganglia hemorrhage
Large left basal ganglia hemorrhage
IPH

## 2020-05-28 NOTE — PROGRESS NOTE ADULT - ASSESSMENT
aflutter    had pauses   off dig and BB  cont cardizem   HR much better past 18 hrs   fu with EP   off a/c due to SAH

## 2020-05-28 NOTE — DISCHARGE NOTE NURSING/CASE MANAGEMENT/SOCIAL WORK - PATIENT PORTAL LINK FT
You can access the FollowMyHealth Patient Portal offered by [facility] by registering at the following website: [email or web address]. By joining [employer]’s FollowMyHealth portal, you will also be able to view your health information using other applications (apps) compatible with our system.

## 2020-05-28 NOTE — PROGRESS NOTE ADULT - ASSESSMENT
87F with PMHx of HTN, HLD, presented with large left frontal/basal ganglia IPH, etiology unknown, possibility of underlying hypertension S/P larry clot removal. Now with PAF/A flutter.     Impression: Left frontal/basal ganglia IPH likely due to  HTN, new onset atrial fibrillation.     NEURO: neurologically without acute change- more awake and interactive today, continue close monitoring for neurologic deterioration as patient with cerebral edema, mass effect, and brain compression, she remains bedbound with immobility due to frailty and functional quadriplegia, maintain normotension, home statin therapy if applicable, MR brain upon resolution of hemorrhage for further evaluation, PMR: [facility].    ANTITHROMBOTIC THERAPY: none at this time in setting of ICH and increased risk for bleeding, pending outpatient follow up- 4 to 6 weeks, repeat CT Head before starting antithrombotic Rx, clinical course and radiological stability will need to determine timeline for initiation of AC vs. Watchman in setting of atrial fibrillation.     PULMONARY: CXR on 5/11 shows enlarged heart, lungs clear, protecting airway, saturating well     CARDIOVASCULAR:  TTE: ef 65-70%, moderate AS, mild AR, LV remodeling, moderate stage II diastolic dysfunction borderline pulmonary htn , cardiac monitoring on 05/24: showed episodes of bradycardia and pauses  EP re-consulted for further recommendations. Cardiology consulted with Dr. Boyd velázquez.       SBP goal: <140mMHg    GASTROINTESTINAL:  dysphagia screen failed, . SLP evaluated patient on 5/20, recommend MBS, failed MBS, GI consulted: s/p PEG on 5/22- tolerating TFs. Nutrition consulted.     Diet: NPO with TF via PEG    RENAL: BUN/Cr without acute change, good urine output, maintain adequate hydration; Hypokalemia- s/p KCl supplement.      Na Goal: Greater than 135     Garcia: n    HEMATOLOGY: H/H without acute change, no active bleeding, Platelets 183     DVT ppx: Heparin s.c [] LMWH [x]     ID: afebrile, no leukocytosis, UA showed UTI- previously on cefepime for pseudomonas which demonstrates sensitivity. Transitioned to Cipro via PEG.    Other: Called family , discussed plan and answered all questions per team over phone and facetime.     DISPOSITION: [facility] once bed available     CORE MEASURES:        Admission NIHSS:      TPA: [] YES [x] NO      LDL/HDL:100/50     Depression Screen: NA     Statin Therapy: as noted     Dysphagia Screen: [] PASS [x] FAIL     Smoking [] YES [x] NO      Afib [x] YES [] NO     Stroke Education [x] YES [] NO    Obtain screening lower extremity venous ultrasound in patients who meet 1 or more of the following criteria as patient is high risk for DVT/PE on admission:   [] History of DVT/PE  []Hypercoagulable states (Factor V Leiden, Cancer, OCP, etc. )  []Prolonged immobility (hemiplegia/hemiparesis/post operative or any other extended immobilization)  [] Transferred from outside facility (Rehab or Long term care)  [] Age </= to 50 87F with PMHx of HTN, HLD, presented with large left frontal/basal ganglia IPH, etiology unknown, possibility of underlying hypertension S/P larry clot removal. Now with PAF/A flutter.     Impression: Left frontal/basal ganglia IPH likely due to  HTN, new onset atrial fibrillation.     NEURO: neurologically without acute change- more awake and interactive today, continue close monitoring for neurologic deterioration as patient with cerebral edema, mass effect, and brain compression, she remains bedbound with immobility due to frailty and functional quadriplegia, maintain normotension, home statin therapy if applicable, MR brain upon resolution of hemorrhage for further evaluation, PMR: [facility].    ANTITHROMBOTIC THERAPY: none at this time in setting of ICH and increased risk for bleeding, pending outpatient follow up- 4 to 6 weeks, repeat CT Head before starting antithrombotic Rx, clinical course and radiological stability will need to determine timeline for initiation of AC vs. Watchman in setting of atrial fibrillation.     PULMONARY: CXR on 5/11 shows enlarged heart, lungs clear, protecting airway, saturating well     CARDIOVASCULAR:  TTE: ef 65-70%, moderate AS, mild AR, LV remodeling, moderate stage II diastolic dysfunction borderline pulmonary htn , cardiac monitoring on 05/24: showed episodes of bradycardia and pauses  EP re-consulted for further recommendations. Cardiology consulted with Dr. Boyd velázquez.       SBP goal: <140mMHg    GASTROINTESTINAL:  dysphagia screen failed. SLP evaluated patient on 5/20, recommend MBS, failed MBS, GI consulted: s/p PEG on 5/22- tolerating TFs. Nutrition consulted.     Diet: NPO with TF via PEG    RENAL: BUN/Cr without acute change, good urine output, maintain adequate hydration; Hypokalemia- s/p KCl supplement.      Na Goal: Greater than 135     Garcia: n    HEMATOLOGY: H/H without acute change, no active bleeding, Platelets 183     DVT ppx: Heparin s.c [] LMWH [x]     ID: afebrile, no leukocytosis, UA showed UTI- previously on cefepime for pseudomonas which demonstrates sensitivity. Transitioned to Cipro via PEG.    Other: Called family, discussed plan and answered all questions per team over phone and facetime.     DISPOSITION: [facility] at [facility] 5/28 12pm    CORE MEASURES:        Admission NIHSS:      TPA: [] YES [x] NO      LDL/HDL:100/50     Depression Screen: NA     Statin Therapy: as noted     Dysphagia Screen: [] PASS [x] FAIL     Smoking [] YES [x] NO      Afib [x] YES [] NO     Stroke Education [x] YES [] NO    Obtain screening lower extremity venous ultrasound in patients who meet 1 or more of the following criteria as patient is high risk for DVT/PE on admission:   [] History of DVT/PE  []Hypercoagulable states (Factor V Leiden, Cancer, OCP, etc. )  []Prolonged immobility (hemiplegia/hemiparesis/post operative or any other extended immobilization)  [] Transferred from outside facility (Rehab or Long term care)  [] Age </= to 50

## 2020-05-28 NOTE — DISCHARGE NOTE NURSING/CASE MANAGEMENT/SOCIAL WORK - NSDCPEPTSTRK_GEN_ALL_CORE
Need for follow up after discharge/Prescribed medications/Risk factors for stroke/Call 911 for stroke/Stroke support groups for patients, families, and friends/Stroke education booklet/Stroke warning signs and symptoms/Signs and symptoms of stroke

## 2020-05-28 NOTE — PROGRESS NOTE ADULT - SUBJECTIVE AND OBJECTIVE BOX
HPI:  Patient seen and examined.  CG with bed mob and transfers.  Max A transfers    MEDICATIONS  (STANDING):  ciprofloxacin     Tablet 500 milliGRAM(s) Oral every 12 hours  diltiazem    Tablet 90 milliGRAM(s) Oral every 6 hours  doxazosin 2 milliGRAM(s) Oral at bedtime  enoxaparin Injectable 40 milliGRAM(s) SubCutaneous <User Schedule>  levETIRAcetam  Solution 500 milliGRAM(s) Oral two times a day  polyethylene glycol 3350 17 Gram(s) Oral every 12 hours  senna 2 Tablet(s) Oral at bedtime  simvastatin 10 milliGRAM(s) Oral at bedtime    MEDICATIONS  (PRN):  acetaminophen   Tablet .. 650 milliGRAM(s) Oral every 6 hours PRN Temp greater or equal to 38C (100.4F), Mild Pain (1 - 3), Moderate Pain (4 - 6), Severe Pain (7 - 10)  ondansetron Injectable 4 milliGRAM(s) IV Push every 6 hours PRN Nausea and/or Vomiting  sodium chloride 3%  Inhalation 3 milliLiter(s) Inhalation every 6 hours PRN secretions    PHYSICAL EXAM  Constitutional - NAD, Comfortable   HEENT - Incision intact  Extremities - No C/C/E, No calf tenderness   Neurologic Exam -                  Aphasia   + alert   Follows some verbal instruction inconsistently   RUE/RLE 0/5, LUE/LLE 4+/5                           13.7   4.39  )-----------( 183      ( 28 May 2020 06:17 )             41.1     05-28    141  |  103  |  20  ----------------------------<  114<H>  3.4<L>   |  24  |  0.56    Ca    9.7      28 May 2020 06:17                  ASSESSMENT/PLAN  87 year-old woman with history of hypertension and hyperlipidemia presented with large left BG hemorrhage s/p left larry evacuation Now with functional, gait, ADL, impairments.    PT - ROM, Bed Mob, Transfers, Amb with AD   OT - ADLs, ROM  SLP - Dysphagia eval and treat  Precautions - Falls, Cardiac  DVT Prophylaxis - Lovenox   Skin - Turn Q2hrs  Disposition recommendation-  [facility] when stable

## 2020-05-28 NOTE — PROGRESS NOTE ADULT - SUBJECTIVE AND OBJECTIVE BOX
Subjective: Patient seen and examined. No new events except as noted.     SUBJECTIVE/ROS:        MEDICATIONS:  MEDICATIONS  (STANDING):  ciprofloxacin     Tablet 500 milliGRAM(s) Oral every 12 hours  diltiazem    Tablet 90 milliGRAM(s) Oral every 6 hours  doxazosin 2 milliGRAM(s) Oral at bedtime  enoxaparin Injectable 40 milliGRAM(s) SubCutaneous <User Schedule>  levETIRAcetam  Solution 500 milliGRAM(s) Oral two times a day  polyethylene glycol 3350 17 Gram(s) Oral every 12 hours  senna 2 Tablet(s) Oral at bedtime  simvastatin 10 milliGRAM(s) Oral at bedtime      PHYSICAL EXAM:  T(C): 36.5 (05-27-20 @ 20:30), Max: 37.2 (05-27-20 @ 16:08)  HR: 91 (05-28-20 @ 06:00) (63 - 125)  BP: 119/60 (05-28-20 @ 06:00) (102/55 - 137/87)  RR: 14 (05-28-20 @ 06:00) (11 - 17)  SpO2: 98% (05-28-20 @ 06:00) (95% - 99%)  Wt(kg): --  I&O's Summary    27 May 2020 07:01  -  28 May 2020 07:00  --------------------------------------------------------  IN: 360 mL / OUT: 950 mL / NET: -590 mL            JVP: Normal  Neck: supple  Lung: clear   CV: S1 S2 , Murmur:  Abd: soft  Ext: No edema  Psych: flat affect  Skin: normal``    LABS/DATA:    CARDIAC MARKERS:                                13.7   4.39  )-----------( 183      ( 28 May 2020 06:17 )             41.1     05-28    141  |  103  |  20  ----------------------------<  114<H>  3.4<L>   |  24  |  0.56    Ca    9.7      28 May 2020 06:17      proBNP:   Lipid Profile:   HgA1c:   TSH:     TELE:  EKG:

## 2020-05-30 PROBLEM — I10 ESSENTIAL (PRIMARY) HYPERTENSION: Chronic | Status: ACTIVE | Noted: 2020-05-09

## 2020-06-03 ENCOUNTER — APPOINTMENT (OUTPATIENT)
Dept: CT IMAGING | Facility: CLINIC | Age: 85
End: 2020-06-03

## 2020-06-09 ENCOUNTER — APPOINTMENT (OUTPATIENT)
Dept: NEUROSURGERY | Facility: CLINIC | Age: 85
End: 2020-06-09
Payer: MEDICARE

## 2020-06-09 ENCOUNTER — RESULT REVIEW (OUTPATIENT)
Age: 85
End: 2020-06-09

## 2020-06-09 ENCOUNTER — APPOINTMENT (OUTPATIENT)
Dept: CT IMAGING | Facility: CLINIC | Age: 85
End: 2020-06-09
Payer: MEDICARE

## 2020-06-09 ENCOUNTER — OUTPATIENT (OUTPATIENT)
Dept: OUTPATIENT SERVICES | Facility: HOSPITAL | Age: 85
LOS: 1 days | End: 2020-06-09
Payer: MEDICARE

## 2020-06-09 VITALS — TEMPERATURE: 98 F

## 2020-06-09 DIAGNOSIS — Z98.49 CATARACT EXTRACTION STATUS, UNSPECIFIED EYE: Chronic | ICD-10-CM

## 2020-06-09 DIAGNOSIS — Z98.890 OTHER SPECIFIED POSTPROCEDURAL STATES: Chronic | ICD-10-CM

## 2020-06-09 DIAGNOSIS — Z98.890 OTHER SPECIFIED POSTPROCEDURAL STATES: ICD-10-CM

## 2020-06-09 DIAGNOSIS — Z00.8 ENCOUNTER FOR OTHER GENERAL EXAMINATION: ICD-10-CM

## 2020-06-09 DIAGNOSIS — Z48.89 ENCOUNTER FOR OTHER SPECIFIED SURGICAL AFTERCARE: ICD-10-CM

## 2020-06-09 PROCEDURE — 70450 CT HEAD/BRAIN W/O DYE: CPT | Mod: 26

## 2020-06-09 PROCEDURE — 99024 POSTOP FOLLOW-UP VISIT: CPT

## 2020-06-09 PROCEDURE — 70450 CT HEAD/BRAIN W/O DYE: CPT

## 2020-06-10 ENCOUNTER — APPOINTMENT (OUTPATIENT)
Dept: NEUROLOGY | Facility: CLINIC | Age: 85
End: 2020-06-10
Payer: MEDICARE

## 2020-06-10 ENCOUNTER — APPOINTMENT (OUTPATIENT)
Dept: NEUROSURGERY | Facility: CLINIC | Age: 85
End: 2020-06-10

## 2020-06-10 DIAGNOSIS — G81.91 HEMIPLEGIA, UNSPECIFIED AFFECTING RIGHT DOMINANT SIDE: ICD-10-CM

## 2020-06-10 PROBLEM — Z48.89 ENCOUNTER FOR POSTOPERATIVE WOUND CHECK: Status: RESOLVED | Noted: 2020-02-19 | Resolved: 2020-06-10

## 2020-06-10 PROBLEM — Z98.890 S/P CRANIOTOMY: Status: ACTIVE | Noted: 2020-06-08

## 2020-06-10 PROBLEM — Z48.89 ENCOUNTER FOR POSTOPERATIVE WOUND CHECK: Status: ACTIVE | Noted: 2020-06-10

## 2020-06-10 PROCEDURE — 99204 OFFICE O/P NEW MOD 45 MIN: CPT | Mod: 95

## 2020-06-10 RX ORDER — DILTIAZEM HYDROCHLORIDE 90 MG/1
90 TABLET, COATED ORAL
Refills: 0 | Status: ACTIVE | COMMUNITY

## 2020-06-10 RX ORDER — LORATADINE 10 MG
17 TABLET,DISINTEGRATING ORAL
Refills: 0 | Status: ACTIVE | COMMUNITY

## 2020-06-10 RX ORDER — LEVETIRACETAM 500 MG/1
500 TABLET, FILM COATED ORAL
Refills: 0 | Status: ACTIVE | COMMUNITY

## 2020-06-10 RX ORDER — DOXAZOSIN 2 MG/1
2 TABLET ORAL
Refills: 0 | Status: ACTIVE | COMMUNITY

## 2020-06-10 RX ORDER — SIMVASTATIN 10 MG/1
10 TABLET, FILM COATED ORAL
Refills: 0 | Status: ACTIVE | COMMUNITY

## 2020-06-10 RX ORDER — GABAPENTIN 300 MG/1
300 CAPSULE ORAL
Qty: 60 | Refills: 0 | Status: DISCONTINUED | COMMUNITY
Start: 2019-04-08 | End: 2020-06-10

## 2020-06-10 RX ORDER — ENOXAPARIN SODIUM 300 MG/3ML
INJECTION INTRAVENOUS; SUBCUTANEOUS
Refills: 0 | Status: ACTIVE | COMMUNITY

## 2020-06-10 RX ORDER — METOPROLOL SUCCINATE 50 MG/1
50 TABLET, EXTENDED RELEASE ORAL
Qty: 90 | Refills: 0 | Status: DISCONTINUED | COMMUNITY
Start: 2017-10-05 | End: 2020-06-10

## 2020-06-10 NOTE — HISTORY OF PRESENT ILLNESS
[Other Location: e.g. School (Enter Location, City,State)___] : at [unfilled], at the time of the visit. [Medical Office: ([facility])___] : at the medical office located in  [Care Coordinator] : care coordinator [FreeTextEntry3] : Shahid Son [FreeTextEntry1] : Ms. Alberto is an 84 year old woman  w/ PMH of HTN, HLD, transferred from [facility] on 05/09 for NSG evaluation after found to have large L. BG IPH. She was found to have severe headache with subsequent syncopal episode. CT BRAIN: Large intraparenchymal hemorrhage involving the left basal ganglia, frontal lobe and medial temporal lobe as described above. Near complete effacement of the left lateral ventricle. There is 0.4 cm of rightward midline shift. CTA BRAIN AND NECK: Patent cervical circulation.  No identified aneurysm or AVM. Decreased size of the left middle cerebral artery may be due to distal branch occlusion or compression by the intraparenchymal hemorrhage. She was emergently brought to OR for Left Priyanka of evacuation of ICH. She was found to have Atrial Flutter, evaluated by EP and Cardiology and started on Cardizem for rate control. TTE: Ef 65-70%, moderate AS, mild AR, LV remodeling, moderate stage II diastolic dysfunction borderline pulmonary htn Speech and Swallow evaluated patient and was recommended NPO, PEG placed by GI on 05/22. She was discharged to [facility] on 05/28.  Today she remains with global aphasia, right facial droop and right hemiplegia. She had a repeat CT scan on 06/09:  Resolving and evolving left basal ganglia hemorrhage since 5/11/2020 with less mass effect on the left lateral ventricle and less midline shift to the right. New small left frontal lobe density subdural collection with local mass effect on the cerebral cortical sulci. The staff at [facility] deny any interval TIA/Stroke symptoms.

## 2020-06-10 NOTE — PHYSICAL EXAM
[General Appearance - Alert] : alert [Hemiplegia Of Right Side] : hemiplegia was present on the right

## 2020-06-10 NOTE — REVIEW OF SYSTEMS
[Facial Weakness] : facial weakness [Arm Weakness] : arm weakness [Hand Weakness] :  hand weakness [Leg Weakness] : leg weakness [Difficulties in Speech] : speech difficulties [Fever] : no fever [Feeling Poorly] : not feeling poorly [Feeling Tired] : not feeling tired [Eyesight Problems] : no eyesight problems [FreeTextEntry3] : [facility]

## 2020-06-10 NOTE — DISCUSSION/SUMMARY
[FreeTextEntry1] : Ms. Alberto is an 84 year old woman with a PMHX of HTN, now 1 month s/p Left frontal/basal ganglia IPH with  cerebral edema, mass effect, and brain compression s/p clot evacuation likely due to  HTN. She was found to have new onset A. Fib in the hospital, was started on Cardizem. She remains with right hemiplegi and global aphasia. Repeat CT scan from 06/09 showed resolving and evolving left basal ganglia hemorrhage since 5/11/2020 with less mass effect on the left lateral ventricle and less midline shift to the right. New small left frontal lobe density subdural collection with local mass effect on the cerebral cortical sulci. Scans reviewed with Dr. Libman, plan to start patient on Eliquis 5 mg BID for ischemic stroke prevention. \par Plan reviewed with patient's son, Shahid on phone who is in agreement and Dr. Dmitry Walton ( [phone]), MD at [facility]. Dr. Walton will order the Eliquis 5 mg BID and repeat CT scan in 1 week to monitor the subdural. We discussed having her evaluated for  a  watchmen device as well.  \par She will follow up with Dr. Pedraza in August. All of their questions and concerns were addressed.

## 2020-06-16 NOTE — HISTORY OF PRESENT ILLNESS
[FreeTextEntry1] : Mrs. Alberto is an 85 yo woman known to our practice after implanting an SCS for chronic back pain 2/12/19.  She arrives in person for an initial HFU s/p 5/9/20 Left craniectomy for a large IPH.  She arrives from [facility] rehab with her son via wheelchair, RIght hemiplegic, aphasic, and not following commands, moving Left UE spontaneously, eyes open, occasional sounds.  \par \par 6/9/20 CT head wo reviewed today - in PACS/ report below

## 2020-06-16 NOTE — PROCEDURE
[FreeTextEntry1] : [facility]\par    [facility] Department of Radiology\par   Radiology Report\par \par \par Patient Name: KO JEFF   Report Date: 10-May-2020 02:00.00 \par Patient ID: [id number] (00), [id number] (EPI)  Accession No.: [id number] \par Patient Birth Date: 1935  Report Status: F \par Referring Physician: [id number] MIRELLA LÓPEZ   Reason For Study: s/p clot evac  \par \par \par \par \par \par EXAM: CT BRAIN \par \par \par PROCEDURE DATE: 05/09/2020 \par \par \par \par \par INTERPRETATION: PROVIDED HISTORY: s/p clot evac \par COMPARISON: 5/9/2020 1419 hours \par TECHNIQUE: CT of the head was performed without intravenous contrast. \par \par FINDINGS: \par Interval placement of a nallely hole biopsy a left posterior parietal approach. \par There is moderate left pneumocephalus. The left cerebral hematoma has \par decreased in size, although mild hemorrhage persists. The hematoma measures \par approximately 7.8 x 2.7 x 4.8 cm, inmaximum AP/TV/CC dimensions. Comparison \par is difficult given the differing morphology. There is some air mixed in with \par the hemorrhage. Associated white matter edema. About 3 mm of left-to-right \par midline shift are present, down from 4 mm. There is decreased effacement of \par the left lateral ventricle and overlying cortical sulci. Basilar cisterns \par are intact. No new hemorrhage. Chronic microvascular ischemic changes \par central atrophy. \par \par IMPRESSION: \par Expected postoperative appearance. Residual hemorrhage as described. \par Decreased mass effect. \par \par \par \par \par \par \par \par \par \par SOFÍA MIKEY \par This document has been electronically signed. May 10 2020 2:00AM \par \par \par \par \par  \par

## 2020-06-16 NOTE — PHYSICAL EXAM
[General Appearance - Alert] : alert [Clean] : clean [Dry] : dry [Well-Healed] : well-healed [Intact] : intact [No Drainage] : without drainage [Normal Skin] : normal [Sclera] : the sclera and conjunctiva were normal [Neck Appearance] : the appearance of the neck was normal [] : no respiratory distress [Edema] : there was no peripheral edema [Skin Color & Pigmentation] : normal skin color and pigmentation [Erythema] : not erythematous [Warm] : not warm [Person] : disoriented to person [Place] : disoriented to place [Time] : disoriented to time

## 2020-06-16 NOTE — ASSESSMENT
[FreeTextEntry1] : CT reviewed which is stable.  She will continue rehab at [facility] for now.  I called and spoke with MD at [facility] to coordinate charging her SCS with her son bringing the  and the [employer] rep available as well for assistance.  The SCS was providing good pain control of her chronic severe back and LLE pain prior to this event.\par \par 1)  I s/w [place] to coordinate SCS [employer] charging for pain control\par 2)  f/u PRN

## 2020-06-16 NOTE — REASON FOR VISIT
[Family Member] : family member [de-identified] : s/p 5/9/20 Left craniectomy for IPH [de-identified] : IN PERSON visit today from [place]

## 2020-06-16 NOTE — REVIEW OF SYSTEMS
[As Noted in HPI] : as noted in HPI [Inability to Walk] : inability to walk [Negative] : Respiratory [Vomiting] : no vomiting [de-identified] : RIght hemiplegic, aphasic, not following commands

## 2020-06-17 ENCOUNTER — RESULT REVIEW (OUTPATIENT)
Age: 85
End: 2020-06-17

## 2020-06-18 ENCOUNTER — RESULT REVIEW (OUTPATIENT)
Age: 85
End: 2020-06-18

## 2020-06-18 ENCOUNTER — APPOINTMENT (OUTPATIENT)
Dept: CT IMAGING | Facility: CLINIC | Age: 85
End: 2020-06-18
Payer: MEDICARE

## 2020-06-18 ENCOUNTER — OUTPATIENT (OUTPATIENT)
Dept: OUTPATIENT SERVICES | Facility: HOSPITAL | Age: 85
LOS: 1 days | End: 2020-06-18
Payer: MEDICARE

## 2020-06-18 DIAGNOSIS — Z00.8 ENCOUNTER FOR OTHER GENERAL EXAMINATION: ICD-10-CM

## 2020-06-18 DIAGNOSIS — Z98.49 CATARACT EXTRACTION STATUS, UNSPECIFIED EYE: Chronic | ICD-10-CM

## 2020-06-18 DIAGNOSIS — Z98.890 OTHER SPECIFIED POSTPROCEDURAL STATES: Chronic | ICD-10-CM

## 2020-06-18 PROCEDURE — 70450 CT HEAD/BRAIN W/O DYE: CPT

## 2020-06-18 PROCEDURE — 70450 CT HEAD/BRAIN W/O DYE: CPT | Mod: 26

## 2020-06-23 ENCOUNTER — APPOINTMENT (OUTPATIENT)
Dept: NEUROSURGERY | Facility: CLINIC | Age: 85
End: 2020-06-23

## 2020-06-23 ENCOUNTER — TRANSCRIPTION ENCOUNTER (OUTPATIENT)
Age: 85
End: 2020-06-23

## 2020-06-26 ENCOUNTER — RESULT REVIEW (OUTPATIENT)
Age: 85
End: 2020-06-26

## 2020-08-12 ENCOUNTER — APPOINTMENT (OUTPATIENT)
Dept: NEUROLOGY | Facility: CLINIC | Age: 85
End: 2020-08-12

## 2020-08-26 ENCOUNTER — APPOINTMENT (OUTPATIENT)
Dept: NEUROLOGY | Facility: CLINIC | Age: 85
End: 2020-08-26
Payer: MEDICARE

## 2020-08-26 VITALS
HEIGHT: 65 IN | BODY MASS INDEX: 26.66 KG/M2 | DIASTOLIC BLOOD PRESSURE: 67 MMHG | WEIGHT: 160 LBS | HEART RATE: 144 BPM | SYSTOLIC BLOOD PRESSURE: 100 MMHG

## 2020-08-26 DIAGNOSIS — I61.9 NONTRAUMATIC INTRACEREBRAL HEMORRHAGE, UNSPECIFIED: ICD-10-CM

## 2020-08-26 DIAGNOSIS — I48.91 UNSPECIFIED ATRIAL FIBRILLATION: ICD-10-CM

## 2020-08-26 DIAGNOSIS — I65.29 OCCLUSION AND STENOSIS OF UNSPECIFIED CAROTID ARTERY: ICD-10-CM

## 2020-08-26 PROCEDURE — 99215 OFFICE O/P EST HI 40 MIN: CPT

## 2020-08-26 NOTE — PHYSICAL EXAM
[FreeTextEntry1] : On wheelchair\par Alert, orientation cant be assessed. \par Globally aphasic, does not follow commands, mimic easy commands with prompting (shows 2 fingers)\par KEENAN, left gaze preference but can cross the midline\par right field cut\par right facial droop\par right side no movement\par left side moves spontaneously\par

## 2020-08-26 NOTE — DISCUSSION/SUMMARY
[FreeTextEntry1] : Ms. Alberto is an 84 year old woman with a PMHX of HTN,with Hx of left frontotemporoparietal IPH, mass effect, and brain compression s/p clot evacuation likely due to HTN. Now she is aphasic with right hemiplegia. She was diagnosed with afib inpatient and is now on eliquis which was started after stability of the scan was confirmed. Repeat CT scan from 6/18 shows a significant improvement and no new hemorrhage. \par Family had questions and concerns regarding the outcome and prognosis which were addressed, imaging reviewed with the family. nursing home paperwork was filled. They requested a telehealth visit for family meeting and discussion regarding to future plans. Will schedule and telehealth visit.\par

## 2020-08-26 NOTE — HISTORY OF PRESENT ILLNESS
[FreeTextEntry1] : As perviously mentioned "Ms. Alberto is an 84 year old woman w/ PMH of HTN, HLD, transferred from [facility] on 05/09 for NSG evaluation after found to have large L. BG IPH. She was found to have severe headache with subsequent syncopal episode. CT BRAIN: Large intraparenchymal hemorrhage involving the left basal ganglia, frontal lobe and medial temporal lobe as described above. Near complete effacement of the left lateral ventricle. There is 0.4 cm of rightward midline shift. CTA BRAIN AND NECK: Patent cervical circulation. No identified aneurysm or AVM. Decreased size of the left middle cerebral artery may be due to distal branch occlusion or compression by the intraparenchymal hemorrhage. She was emergently brought to OR for Left Priyanka of evacuation of ICH. She was found to have Atrial Flutter, evaluated by EP and Cardiology and started on Cardizem for rate control. TTE: Ef 65-70%, moderate AS, mild AR, LV remodeling, moderate stage II diastolic dysfunction borderline pulmonary htn Speech and Swallow evaluated patient and was recommended NPO, PEG placed by GI on 05/22. She was discharged to [facility] on 05/28. Today she remains with global aphasia, right facial droop and right hemiplegia. She had a repeat CT scan on 06/09: Resolving and evolving left basal ganglia hemorrhage since 5/11/2020 with less mass effect on the left lateral ventricle and less midline shift to the right. New small left frontal lobe density subdural collection with local mass effect on the cerebral cortical sulci. The staff at [facility] deny any interval TIA/Stroke symptoms." At this point she remains aphasic with right side hemiparesis and MRA of 6, wheelchair bound.

## 2022-03-07 NOTE — PATIENT PROFILE ADULT - BRADEN MOISTURE
Pt. Here for treatment, Has no questions or concerns for  At this time. Right upper chest mediport accessed without difficulty, good blood return noted.  Lab work drawn as ordered. Lorie Landry reviewed and treatment administered without incident. Pt tolerated well.  Discharge AVS given.      Patient's status assessed and documented appropriately.  All labs and required results were also reviewed today.  Treatment parameters have been reviewed.  Today's treatment has been approved by the provider.  Treatment orders and medication sequencing (when applicable) was verified by 2 registered nurses.  The treatment plan was confirmed with the patient prior to administration, and the patient understands the need to report any treatment-related symptoms.     Prior to administration, when applicable, the following 8 elements of medication administration were reviewed with 2nd Registered Nurse prior to dosing: drug name, drug dose, infusion volume when prepared in a syringe, rate of administration, expiration dates and/or times, appearance and integrity of drug(s), and rate of pump for infusion.  The 5 rights of medication administration have been verified
(4) rarely moist

## 2023-01-26 NOTE — H&P PST ADULT - NOTES
On levothyroxine 50 mcg QD  Normal TSH 12/22  - C/w home dose of levothyroxine 2 sisters living; 3 sons

## 2023-11-13 NOTE — PROVIDER CONTACT NOTE (OTHER) - REASON
----- Message from Marion Guaman MA sent at 11/13/2023  9:01 AM EST -----  I informed pt of Results   She expressed understanding and is Ok with trying a new medication but thinks that she may already have Atorvastatin. She wants to she if she has it and tried before sending in new Rx.  She said she will call back.  SERVANDO   Patient's

## 2024-01-09 NOTE — OCCUPATIONAL THERAPY INITIAL EVALUATION ADULT - LEVEL OF INDEPENDENCE: SIT/SUPINE, REHAB EVAL
Continue management w/GI  
May be contributing to BLE pain/weakness  Evaluate further w/bilat doppler as ordered   Compression stocking rx issued - advise he wear regularly  May need consult to vascular  
Mood stable w/PHQ score of 4  Continue same bupropion   Return in April as scheduled  
Platelets stable/chronically low  
contact guard

## 2024-01-17 NOTE — PROGRESS NOTE ADULT - ASSESSMENT
[facility] Hospitalist H&P    Chief Complaint   Patient presents with    Syncope       History Of Present Illness  Ramesh is a 74 year old male presenting with PMHx including depression, schizoaffective disorder- bipolar type, personality disorder, EtOH abuse, cocaine abuse, hx heroine abuse, AMEYA, DM2, chest pain, a-fib, HTN, HLD, anemia, hx of hepatitis,  hx hematuria, hx prostate cancer, urinary incontinence, hx urethral stricture, chronic pain, cervical spinal stenosis, hx rib fracture, opioid dependence, neuropathy, gout, presenting to the ER with concerns of syncope    Symptoms started today.  He was standing in the kitchen & he was about to cook something to eat.  No feeling dizzy.  Said he didn't just change positions, and had been standing there already.  He said he ended up just passing out, causing him to fall to the floor, and bump his head.  Having some nonradiating low back pain after the fall.  The pain medication has helped so far.  Also some R posterior rib pains.  Said he had a similar symptoms like this where he passed out in the past too. No CP/ SOB/ swelling/ palpitations.  No HA/ speech change/ localized weakness/ numbness/ tingling.  [facility]  Doesn't take his medications regularly, thinks he might not even need some of them.  Said he takes them as needed.  No other symptoms or concerns reported at this time.  An 11-system review was negative other than as per history of present illness        Past Medical History  Past Medical History:   Diagnosis Date    AF (atrial fibrillation) (CMD)     Diabetes mellitus (CMD)     Essential (primary) hypertension         Surgical History  History reviewed. No pertinent surgical history.     Social History   Tobacco- current smoker  Vape/smokeless- no  EtOH- drinks EtOH 3-4x per week currently.  has hx of EtOH abuse  Illicits- currently smokes marijuana.  has hx of heroin & cocaine    Family History  No known hx heart  disease/ HTN/ HLD/ or DM in mom or dad     Allergies  ALLERGIES:  Patient has no allergy information on record.    Medications  Current Facility-Administered Medications   Medication    ondansetron (ZOFRAN ODT) disintegrating tablet 4 mg    Or    ondansetron (ZOFRAN) injection 4 mg    polyethylene glycol (MIRALAX) packet 17 g    docusate sodium-sennosides (SENOKOT S) 50-8.6 MG 2 tablet    bisacodyl (DULCOLAX) suppository 10 mg    melatonin tablet 3 mg    Potassium Standard Replacement Protocol (Levels 3.5 and lower)    Magnesium Standard Replacement Protocol    aluminum-magnesium hydroxide-simethicone (MAALOX) 200-200-20 MG/5ML suspension 30 mL    sodium chloride 0.9 % flush bag 25 mL    sodium chloride 0.9 % injection 2 mL    sodium chloride (NORMAL SALINE) 0.9 % bolus 500 mL    dextrose 50 % injection 25 g    dextrose 50 % injection 12.5 g    glucagon (GLUCAGEN) injection 1 mg    dextrose (GLUTOSE) 40 % gel 15 g    dextrose (GLUTOSE) 40 % gel 30 g    insulin lispro (ADMELOG,HumaLOG) - Correction Dose    insulin lispro (ADMELOG,HumaLOG) - Correction Dose    hydrALAZINE (APRESOLINE) injection 10 mg    HYDROcodone-acetaminophen (NORCO) 5-325 MG per tablet 1 tablet    acetaminophen (TYLENOL) tablet 650 mg    Or    acetaminophen (TYLENOL) suppository 650 mg    lactated ringers infusion       Review of Systems  ROS  An 11-system review was negative other than as per history of present illness     Last Recorded Vitals  Blood pressure (!) 161/98, pulse 89, temperature 97.4 °F (36.3 °C), temperature source Oral, resp. rate 19, SpO2 98 %.    Physical Exam  Physical Exam  Constitutional:       General: He is not in acute distress.  HENT:      Head: Normocephalic and atraumatic.   Eyes:      Pupils: Pupils are equal, round, and reactive to light.   Cardiovascular:      Rate and Rhythm: Normal rate. Rhythm irregularly irregular.      Pulses:           Radial pulses are 2+ on the right side and 2+ on the left side.         Dorsalis pedis pulses are 2+ on the right side and 2+ on the left side.   Pulmonary:      Effort: Pulmonary effort is normal. No accessory muscle usage or respiratory distress.      Breath sounds: Normal breath sounds. No wheezing, rhonchi or rales.   Chest:      Chest wall: No tenderness.   Abdominal:      General: Bowel sounds are normal. There is no distension.      Palpations: Abdomen is soft.      Tenderness: There is no abdominal tenderness. There is no guarding or rebound.   Musculoskeletal:         General: No tenderness. Normal range of motion.      Cervical back: Normal range of motion and neck supple.      Right lower leg: No edema.      Left lower leg: No edema.   Skin:     General: Skin is warm and dry.      Capillary Refill: Capillary refill takes less than 2 seconds.      Coloration: Skin is not cyanotic.   Neurological:      Mental Status: He is alert and oriented to person, place, and time.      Cranial Nerves: No cranial nerve deficit.   Psychiatric:         Mood and Affect: Mood and affect normal.          Labs     Recent Results (from the past 24 hour(s))   Electrocardiogram 12-Lead    Collection Time: 01/16/24 11:45 AM   Result Value Ref Range    Ventricular Rate EKG/Min (BPM) 88     Atrial Rate (BPM) 88     TN-Interval (MSEC) 146     QRS-Interval (MSEC) 94     QT-Interval (MSEC) 418     QTc 506     P Axis (Degrees) 65     R Axis (Degrees) -52     T Axis (Degrees) -65     REPORT TEXT       Sinus rhythm  with occasional  premature ventricular complexes  Left axis deviation  Incomplete right bundle branch block  Cannot rule out  Anteroseptal infarct  , age undetermined  T wave abnormality, consider inferolateral ischemia  Prolonged QT  Confirmed by LILIA SERVIN MD ([id number]) on 1/16/2024 3:03:19 PM     GLUCOSE, BEDSIDE - POINT OF CARE    Collection Time: 01/16/24 11:48 AM   Result Value Ref Range    GLUCOSE, BEDSIDE - POINT OF CARE 186 (H) 70 - 99 mg/dL   Comprehensive Metabolic Panel     aflutter    had pauses   off dig and BB  on cardizem with episodes of tachycardia  evidence of tachy froylan syndrome  will defer to EP for rate management and consideration of PPM   off a/c due to SAH Collection Time: 01/16/24 11:54 AM   Result Value Ref Range    Fasting Status      Sodium 135 135 - 145 mmol/L    Potassium 3.8 3.4 - 5.1 mmol/L    Chloride 97 97 - 110 mmol/L    Carbon Dioxide 26 21 - 32 mmol/L    Anion Gap 16 7 - 19 mmol/L    Glucose 236 (H) 70 - 99 mg/dL    BUN 9 6 - 20 mg/dL    Creatinine 0.86 0.67 - 1.17 mg/dL    Glomerular Filtration Rate >90 >=60    BUN/Cr 10 7 - 25    Calcium 10.2 8.4 - 10.2 mg/dL    Bilirubin, Total 1.7 (H) 0.2 - 1.0 mg/dL    GOT/AST 66 (H) <=37 Units/L    GPT/ALT 28 <64 Units/L    Alkaline Phosphatase 141 (H) 45 - 117 Units/L    Albumin 3.6 3.6 - 5.1 g/dL    Protein, Total 8.4 (H) 6.4 - 8.2 g/dL    Globulin 4.8 (H) 2.0 - 4.0 g/dL    A/G Ratio 0.8 (L) 1.0 - 2.4   TROPONIN I, HIGH SENSITIVITY    Collection Time: 01/16/24 11:54 AM   Result Value Ref Range    Troponin I, High Sensitivity 9 <77 ng/L   CBC with Automated Differential (performable only)    Collection Time: 01/16/24 11:54 AM   Result Value Ref Range    WBC 4.8 4.2 - 11.0 K/mcL    RBC 3.79 (L) 4.50 - 5.90 mil/mcL    HGB 13.5 13.0 - 17.0 g/dL    HCT 38.0 (L) 39.0 - 51.0 %    .3 (H) 78.0 - 100.0 fl    MCH 35.6 (H) 26.0 - 34.0 pg    MCHC 35.5 32.0 - 36.5 g/dL    RDW-CV 13.4 11.0 - 15.0 %    RDW-SD 49.5 39.0 - 50.0 fL     140 - 450 K/mcL    NRBC 0 <=0 /100 WBC    Neutrophil, Percent 75 %    Lymphocytes, Percent 17 %    Mono, Percent 6 %    Eosinophils, Percent 0 %    Basophils, Percent 0 %    Immature Granulocytes 2 %    Absolute Neutrophils 3.6 1.8 - 7.7 K/mcL    Absolute Lymphocytes 0.8 (L) 1.0 - 4.0 K/mcL    Absolute Monocytes 0.3 0.3 - 0.9 K/mcL    Absolute Eosinophils  0.0 0.0 - 0.5 K/mcL    Absolute Basophils 0.0 0.0 - 0.3 K/mcL    Absolute Immature Granulocytes 0.1 0.0 - 0.2 K/mcL   Creatine Kinase    Collection Time: 01/16/24 11:54 AM   Result Value Ref Range     39 - 308 Units/L        Imaging    XR LUMBAR SPINE 2 OR 3 VIEWS   Final Result   Acute, compression fracture of L1 with 10%  vertebral body height loss.               Electronically Signed by: MAKAYLA RAMIREZ MD    Signed on: 1/16/2024 3:55 PM    Workstation ID: [id number]      CT HEAD WO CONTRAST   Final Result      No acute intracranial abnormality.      Electronically Signed by: PATRICIA VERDUZCO MD    Signed on: 1/16/2024 1:27 PM    Workstation ID: [id number]      XR RIBS 2 VIEWS RIGHT  W CHEST 1 VIEW   Final Result   FINDINGS/IMPRESSION:      No radiographically apparent rib fracture or destructive osseous lesion.    No pneumothorax.  Visualized joints are maintained.  Remaining soft tissues   are unremarkable. No radiographic evidence of acute cardiopulmonary   disease.      Electronically Signed by: MARIBEL KWONG MD    Signed on: 1/16/2024 12:39 PM    Workstation ID: [id number]           Assessment/Plan:  Ramesh is a 74 year old male presenting with PMHx including depression, schizoaffective disorder- bipolar type, personality disorder, EtOH abuse, cocaine abuse, hx heroine abuse, AMEYA, DM2, chest pain, a-fib, HTN, HLD, anemia, hx of hepatitis,  hx hematuria, hx prostate cancer, urinary incontinence, hx urethral stricture, chronic pain, cervical spinal stenosis, hx rib fracture, opioid dependence, neuropathy, gout, presenting to the ER with concerns of syncope    Syncope & collapse  -CTH nothing acute  -XR ribs & XR L spine as above  -TSH, UA, TTE, US carotids, orthostatics, tele; on fall precautions  -has hx of EtOH, and past hx of cocaine/heroin use; check Utox & EtOH levels  -pt reported in ER he only takes his home meds when he needs them  -cardiology consult  -neurology consult    Orthostatic hypotension  Hypertension  -was found to be orthostatic positive in ER with BP dropping to 59/34  -received IVF in ER, and SBP now elevated to 160s-180s  -on IVF; PRN IV hydralazine w/ parameters; check TTE, monitor tele  -cardiology consulted    Acute compression fracture of L1  -XR L spine as above  -pain control PRN, fall  precautions  -spine surgery consulted in ER    Hyperlipidemia w/ DM2  -home statin held for tonight given transaminitis    Current use of long term anticoagulation  A-fib  -held home metoprolol for tonight given hypotension pt had in ER  -cont home xarelto  -monitor tele  -cardiology consulted    Transaminitis;  Hyperbilirubinemia  -AST 66 & .  ALT 28- WNL, Tbili 1.7  -suspect related to EtOH hx.  Pt also had \"viral hepatitis unspecified in 2007\" listed in his PMHx in care everywhere  -abd exam ok, no GI symptoms reported  -follow CMP & monitor  -consider further w/u and or consultation pending results/ monitoring  -also advise outpt f/u w/ PCP/GI    EtOH abuse  -AST 66 & ALT 28; .3- suspect these findings 2/2 EtOH hx  -currently drinks 3-4x per week  -as a precaution ordered CIWA scale, multivitamin, folate, thiamine, PRN ativan  -fall precautions, seizure precautions    Polysubstance abuse  -reported current marijuana; has past hx of cocaine & heroine use;   -checking Utox    NIDDM2 w/ hyperglycemia & diabetic neuropathy  -; hold home PO meds while in hospital  -held home lyrica for now given syncope  -accucheks, iss, monitor    GERD  -no acute issues; home ppi, monitor    Gout  -no acute issues; resume home allopurinol, monitor    Hx of prostate cancer  -outpt f/u with PCP/ urology/ oncology for monitoring      DVT Prophylaxis  xarelto    Code Status    Code Status: Full Resuscitation    Level of care  I certify Observation status with expected length of stay under 2 midnights for: the above medical issues.    Primary Care Physician  Yarely Mathews MD Jodie L Gaw, DO  [facility] Hospitalist  1/16/2024  6:06 PM      ------------------------------------------------  Patient Privacy Notice      The 21st Century Cures Act makes medical notes like these available to patients in the interest of transparency. Please be advised that this is a medical document. Medical documents are intended to carry  relevant information and the clinical opinion of the practitioner. The medical note is intended as medical provider to provider communication, and may appear blunt or direct. It is written in medical language, and may contain abbreviations or verbiage that are unfamiliar.

## 2025-02-18 NOTE — ASU PATIENT PROFILE, ADULT - SPIRITUAL CULTURAL, CURRENT SITUATION, PROFILE
Solaraze Counseling:  I discussed with the patient the risks of Solaraze including but not limited to erythema, scaling, itching, weeping, crusting, and pain. Griseofulvin Counseling:  I discussed with the patient the risks of griseofulvin including but not limited to photosensitivity, cytopenia, liver damage, nausea/vomiting and severe allergy.  The patient understands that this medication is best absorbed when taken with a fatty meal (e.g., ice cream or french fries). Erivedge Counseling- I discussed with the patient the risks of Erivedge including but not limited to nausea, vomiting, diarrhea, constipation, weight loss, changes in the sense of taste, decreased appetite, muscle spasms, and hair loss.  The patient verbalized understanding of the proper use and possible adverse effects of Erivedge.  All of the patient's questions and concerns were addressed. Infliximab Counseling:  I discussed with the patient the risks of infliximab including but not limited to myelosuppression, immunosuppression, autoimmune hepatitis, demyelinating diseases, lymphoma, and serious infections.  The patient understands that monitoring is required including a PPD at baseline and must alert us or the primary physician if symptoms of infection or other concerning signs are noted. Colchicine Counseling:  Patient counseled regarding adverse effects including but not limited to stomach upset (nausea, vomiting, stomach pain, or diarrhea).  Patient instructed to limit alcohol consumption while taking this medication.  Colchicine may reduce blood counts especially with prolonged use.  The patient understands that monitoring of kidney function and blood counts may be required, especially at baseline. The patient verbalized understanding of the proper use and possible adverse effects of colchicine.  All of the patient's questions and concerns were addressed. Skyrizi Pregnancy And Lactation Text: The risk during pregnancy and breastfeeding is uncertain with this medication. Carac Counseling:  I discussed with the patient the risks of Carac including but not limited to erythema, scaling, itching, weeping, crusting, and pain. Oral Minoxidil Pregnancy And Lactation Text: This medication should only be used when clearly needed if you are pregnant, attempting to become pregnant or breast feeding. Bexarotene Pregnancy And Lactation Text: This medication is Pregnancy Category X and should not be given to women who are pregnant or may become pregnant. This medication should not be used if you are breast feeding. Elidel Pregnancy And Lactation Text: This medication is Pregnancy Category C. It is unknown if this medication is excreted in breast milk. Doxycycline Counseling:  Patient counseled regarding possible photosensitivity and increased risk for sunburn.  Patient instructed to avoid sunlight, if possible.  When exposed to sunlight, patients should wear protective clothing, sunglasses, and sunscreen.  The patient was instructed to call the office immediately if the following severe adverse effects occur:  hearing changes, easy bruising/bleeding, severe headache, or vision changes.  The patient verbalized understanding of the proper use and possible adverse effects of doxycycline.  All of the patient's questions and concerns were addressed. Cosentyx Pregnancy And Lactation Text: This medication is Pregnancy Category B and is considered safe during pregnancy. It is unknown if this medication is excreted in breast milk. Zyclara Counseling:  I discussed with the patient the risks of imiquimod including but not limited to erythema, scaling, itching, weeping, crusting, and pain.  Patient understands that the inflammatory response to imiquimod is variable from person to person and was educated regarded proper titration schedule.  If flu-like symptoms develop, patient knows to discontinue the medication and contact us. Quinolones Pregnancy And Lactation Text: This medication is Pregnancy Category C and it isn't know if it is safe during pregnancy. It is also excreted in breast milk. Hydroxychloroquine Pregnancy And Lactation Text: This medication has been shown to cause fetal harm but it isn't assigned a Pregnancy Risk Category. There are small amounts excreted in breast milk. Finasteride Pregnancy And Lactation Text: This medication is absolutely contraindicated during pregnancy. It is unknown if it is excreted in breast milk. Eucrisa Counseling: Patient may experience a mild burning sensation during topical application. Eucrisa is not approved in children less than 3 months of age. Rinvoq Pregnancy And Lactation Text: Based on animal studies, Rinvoq may cause embryo-fetal harm when administered to pregnant women.  The medication should not be used in pregnancy.  Breastfeeding is not recommended during treatment and for 6 days after the last dose. Finasteride Male Counseling: Finasteride Counseling:  I discussed with the patient the risks of use of finasteride including but not limited to decreased libido, decreased ejaculate volume, gynecomastia, and depression. Women should not handle medication.  All of the patient's questions and concerns were addressed. Mirvaso Pregnancy And Lactation Text: This medication has not been assigned a Pregnancy Risk Category. It is unknown if the medication is excreted in breast milk. Methotrexate Counseling:  Patient counseled regarding adverse effects of methotrexate including but not limited to nausea, vomiting, abnormalities in liver function tests. Patients may develop mouth sores, rash, diarrhea, and abnormalities in blood counts. The patient understands that monitoring is required including LFT's and blood counts.  There is a rare possibility of scarring of the liver and lung problems that can occur when taking methotrexate. Persistent nausea, loss of appetite, pale stools, dark urine, cough, and shortness of breath should be reported immediately. Patient advised to discontinue methotrexate treatment at least three months before attempting to become pregnant.  I discussed the need for folate supplements while taking methotrexate.  These supplements can decrease side effects during methotrexate treatment. The patient verbalized understanding of the proper use and possible adverse effects of methotrexate.  All of the patient's questions and concerns were addressed. Thalidomide Counseling: I discussed with the patient the risks of thalidomide including but not limited to birth defects, anxiety, weakness, chest pain, dizziness, cough and severe allergy. Albendazole Pregnancy And Lactation Text: This medication is Pregnancy Category C and it isn't known if it is safe during pregnancy. It is also excreted in breast milk. Topical Clindamycin Pregnancy And Lactation Text: This medication is Pregnancy Category B and is considered safe during pregnancy. It is unknown if it is excreted in breast milk. Erivedge Pregnancy And Lactation Text: This medication is Pregnancy Category X and is absolutely contraindicated during pregnancy. It is unknown if it is excreted in breast milk. Dupixent Counseling: I discussed with the patient the risks of dupilumab including but not limited to eye infection and irritation, cold sores, injection site reactions, worsening of asthma, allergic reactions and increased risk of parasitic infection.  Live vaccines should be avoided while taking dupilumab. Dupilumab will also interact with certain medications such as warfarin and cyclosporine. The patient understands that monitoring is required and they must alert us or the primary physician if symptoms of infection or other concerning signs are noted. Otezla Counseling: The side effects of Otezla were discussed with the patient, including but not limited to worsening or new depression, weight loss, diarrhea, nausea, upper respiratory tract infection, and headache. Patient instructed to call the office should any adverse effect occur.  The patient verbalized understanding of the proper use and possible adverse effects of Otezla.  All the patient's questions and concerns were addressed. Carac Pregnancy And Lactation Text: This medication is Pregnancy Category X and contraindicated in pregnancy and in women who may become pregnant. It is unknown if this medication is excreted in breast milk. Azithromycin Counseling:  I discussed with the patient the risks of azithromycin including but not limited to GI upset, allergic reaction, drug rash, diarrhea, and yeast infections. none Topical Ketoconazole Counseling: Patient counseled that this medication may cause skin irritation or allergic reactions.  In the event of skin irritation, the patient was advised to reduce the amount of the drug applied or use it less frequently.   The patient verbalized understanding of the proper use and possible adverse effects of ketoconazole.  All of the patient's questions and concerns were addressed. Griseofulvin Pregnancy And Lactation Text: This medication is Pregnancy Category X and is known to cause serious birth defects. It is unknown if this medication is excreted in breast milk but breast feeding should be avoided. Solaraze Pregnancy And Lactation Text: This medication is Pregnancy Category B and is considered safe. There is some data to suggest avoiding during the third trimester. It is unknown if this medication is excreted in breast milk. Stelara Counseling:  I discussed with the patient the risks of ustekinumab including but not limited to immunosuppression, malignancy, posterior leukoencephalopathy syndrome, and serious infections.  The patient understands that monitoring is required including a PPD at baseline and must alert us or the primary physician if symptoms of infection or other concerning signs are noted. Colchicine Pregnancy And Lactation Text: This medication is Pregnancy Category C and isn't considered safe during pregnancy. It is excreted in breast milk. Birth Control Pills Counseling: Birth Control Pill Counseling: I discussed with the patient the potential side effects of OCPs including but not limited to increased risk of stroke, heart attack, thrombophlebitis, deep venous thrombosis, hepatic adenomas, breast changes, GI upset, headaches, and depression.  The patient verbalized understanding of the proper use and possible adverse effects of OCPs. All of the patient's questions and concerns were addressed. Doxycycline Pregnancy And Lactation Text: This medication is Pregnancy Category D and not consider safe during pregnancy. It is also excreted in breast milk but is considered safe for shorter treatment courses. Isotretinoin Counseling: Patient should get monthly blood tests, not donate blood, not drive at night if vision affected, not share medication, and not undergo elective surgery for 6 months after tx completed. Side effects reviewed, pt to contact office should one occur. Low Dose Naltrexone Counseling- I discussed with the patient the potential risks and side effects of low dose naltrexone including but not limited to: more vivid dreams, headaches, nausea, vomiting, abdominal pain, fatigue, dizziness, and anxiety. Rifampin Counseling: I discussed with the patient the risks of rifampin including but not limited to liver damage, kidney damage, red-orange body fluids, nausea/vomiting and severe allergy. Cimetidine Counseling:  I discussed with the patient the risks of Cimetidine including but not limited to gynecomastia, headache, diarrhea, nausea, drowsiness, arrhythmias, pancreatitis, skin rashes, psychosis, bone marrow suppression and kidney toxicity. Erythromycin Counseling:  I discussed with the patient the risks of erythromycin including but not limited to GI upset, allergic reaction, drug rash, diarrhea, increase in liver enzymes, and yeast infections. Low Dose Naltrexone Pregnancy And Lactation Text: Naltrexone is pregnancy category C.  There have been no adequate and well-controlled studies in pregnant women.  It should be used in pregnancy only if the potential benefit justifies the potential risk to the fetus.   Limited data indicates that naltrexone is minimally excreted into breastmilk. Isotretinoin Pregnancy And Lactation Text: This medication is Pregnancy Category X and is considered extremely dangerous during pregnancy. It is unknown if it is excreted in breast milk. Rifampin Pregnancy And Lactation Text: This medication is Pregnancy Category C and it isn't know if it is safe during pregnancy. It is also excreted in breast milk and should not be used if you are breast feeding. Eucrisa Pregnancy And Lactation Text: This medication has not been assigned a Pregnancy Risk Category but animal studies failed to show danger with the topical medication. It is unknown if the medication is excreted in breast milk. Sotyktu Counseling:  I discussed the most common side effects of Sotyktu including: common cold, sore throat, sinus infections, cold sores, canker sores, folliculitis, and acne.  I also discussed more serious side effects of Sotyktu including but not limited to: serious allergic reactions; increased risk for infections such as TB; cancers such as lymphomas; rhabdomyolysis and elevated CPK; and elevated triglycerides and liver enzymes.  Opzelura Counseling:  I discussed with the patient the risks of Opzelura including but not limited to nasopharngitis, bronchitis, ear infection, eosinophila, hives, diarrhea, folliculitis, tonsillitis, and rhinorrhea.  Taken orally, this medication has been linked to serious infections; higher rate of mortality; malignancy and lymphoproliferative disorders; major adverse cardiovascular events; thrombosis; thrombocytopenia, anemia, and neutropenia; and lipid elevations. Ivermectin Counseling:  Patient instructed to take medication on an empty stomach with a full glass of water.  Patient informed of potential adverse effects including but not limited to nausea, diarrhea, dizziness, itching, and swelling of the extremities or lymph nodes.  The patient verbalized understanding of the proper use and possible adverse effects of ivermectin.  All of the patient's questions and concerns were addressed. Methotrexate Pregnancy And Lactation Text: This medication is Pregnancy Category X and is known to cause fetal harm. This medication is excreted in breast milk. Prednisone Counseling:  I discussed with the patient the risks of prolonged use of prednisone including but not limited to weight gain, insomnia, osteoporosis, mood changes, diabetes, susceptibility to infection, glaucoma and high blood pressure.  In cases where prednisone use is prolonged, patients should be monitored with blood pressure checks, serum glucose levels and an eye exam.  Additionally, the patient may need to be placed on GI prophylaxis, PCP prophylaxis, and calcium and vitamin D supplementation and/or a bisphosphonate.  The patient verbalized understanding of the proper use and the possible adverse effects of prednisone.  All of the patient's questions and concerns were addressed. Opioid Counseling: I discussed with the patient the potential side effects of opioids including but not limited to addiction, altered mental status, and depression. I stressed avoiding alcohol, benzodiazepines, muscle relaxants and sleep aids unless specifically okayed by a physician. The patient verbalized understanding of the proper use and possible adverse effects of opioids. All of the patient's questions and concerns were addressed. They were instructed to flush the remaining pills down the toilet if they did not need them for pain. Opzelura Pregnancy And Lactation Text: There is insufficient data to evaluate drug-associated risk for major birth defects, miscarriage, or other adverse maternal or fetal outcomes.  There is a pregnancy registry that monitors pregnancy outcomes in pregnant persons exposed to the medication during pregnancy.  It is unknown if this medication is excreted in breast milk.  Do not breastfeed during treatment and for about 4 weeks after the last dose. Birth Control Pills Pregnancy And Lactation Text: This medication should be avoided if pregnant and for the first 30 days post-partum. Itraconazole Counseling:  I discussed with the patient the risks of itraconazole including but not limited to liver damage, nausea/vomiting, neuropathy, and severe allergy.  The patient understands that this medication is best absorbed when taken with acidic beverages such as non-diet cola or ginger ale.  The patient understands that monitoring is required including baseline LFTs and repeat LFTs at intervals.  The patient understands that they are to contact us or the primary physician if concerning signs are noted. Dupixent Pregnancy And Lactation Text: This medication likely crosses the placenta but the risk for the fetus is uncertain. This medication is excreted in breast milk. Azithromycin Pregnancy And Lactation Text: This medication is considered safe during pregnancy and is also secreted in breast milk. Calcipotriene Counseling:  I discussed with the patient the risks of calcipotriene including but not limited to erythema, scaling, itching, and irritation. Topical Retinoid counseling:  Patient advised to apply a pea-sized amount only at bedtime and wait 30 minutes after washing their face before applying.  If too drying, patient may add a non-comedogenic moisturizer. The patient verbalized understanding of the proper use and possible adverse effects of retinoids.  All of the patient's questions and concerns were addressed. Rituxan Counseling:  I discussed with the patient the risks of Rituxan infusions. Side effects can include infusion reactions, severe drug rashes including mucocutaneous reactions, reactivation of latent hepatitis and other infections and rarely progressive multifocal leukoencephalopathy.  All of the patient's questions and concerns were addressed. Dapsone Counseling: I discussed with the patient the risks of dapsone including but not limited to hemolytic anemia, agranulocytosis, rashes, methemoglobinemia, kidney failure, peripheral neuropathy, headaches, GI upset, and liver toxicity.  Patients who start dapsone require monitoring including baseline LFTs and weekly CBCs for the first month, then every month thereafter.  The patient verbalized understanding of the proper use and possible adverse effects of dapsone.  All of the patient's questions and concerns were addressed. Taltz Counseling: I discussed with the patient the risks of ixekizumab including but not limited to immunosuppression, serious infections, worsening of inflammatory bowel disease and drug reactions.  The patient understands that monitoring is required including a PPD at baseline and must alert us or the primary physician if symptoms of infection or other concerning signs are noted. Bactrim Counseling:  I discussed with the patient the risks of sulfa antibiotics including but not limited to GI upset, allergic reaction, drug rash, diarrhea, dizziness, photosensitivity, and yeast infections.  Rarely, more serious reactions can occur including but not limited to aplastic anemia, agranulocytosis, methemoglobinemia, blood dyscrasias, liver or kidney failure, lung infiltrates or desquamative/blistering drug rashes. Calcipotriene Pregnancy And Lactation Text: This medication has not been proven safe during pregnancy. It is unknown if this medication is excreted in breast milk. Tranexamic Acid Counseling:  Patient advised of the small risk of bleeding problems with tranexamic acid. They were also instructed to call if they developed any nausea, vomiting or diarrhea. All of the patient's questions and concerns were addressed. Erythromycin Pregnancy And Lactation Text: This medication is Pregnancy Category B and is considered safe during pregnancy. It is also excreted in breast milk. Sarecycline Counseling: Patient advised regarding possible photosensitivity and discoloration of the teeth, skin, lips, tongue and gums.  Patient instructed to avoid sunlight, if possible.  When exposed to sunlight, patients should wear protective clothing, sunglasses, and sunscreen.  The patient was instructed to call the office immediately if the following severe adverse effects occur:  hearing changes, easy bruising/bleeding, severe headache, or vision changes.  The patient verbalized understanding of the proper use and possible adverse effects of sarecycline.  All of the patient's questions and concerns were addressed. Cimetidine Pregnancy And Lactation Text: This medication is Pregnancy Category B and is considered safe during pregnancy. It is also excreted in breast milk and breast feeding isn't recommended. Niacinamide Counseling: I recommended taking niacin or niacinamide, also know as vitamin B3, twice daily. Recent evidence suggests that taking vitamin B3 (500 mg twice daily) can reduce the risk of actinic keratoses and non-melanoma skin cancers. Side effects of vitamin B3 include flushing and headache. Sotyktu Pregnancy And Lactation Text: There is insufficient data to evaluate whether or not Sotyktu is safe to use during pregnancy.   It is not known if Sotyktu passes into breast milk and whether or not it is safe to use when breastfeeding.   High Dose Vitamin A Counseling: Side effects reviewed, pt to contact office should one occur. Hydroquinone Counseling:  Patient advised that medication may result in skin irritation, lightening (hypopigmentation), dryness, and burning.  In the event of skin irritation, the patient was advised to reduce the amount of the drug applied or use it less frequently.  Rarely, spots that are treated with hydroquinone can become darker (pseudoochronosis).  Should this occur, patient instructed to stop medication and call the office. The patient verbalized understanding of the proper use and possible adverse effects of hydroquinone.  All of the patient's questions and concerns were addressed. Libtayo Counseling- I discussed with the patient the risks of Libtayo including but not limited to nausea, vomiting, diarrhea, and bone or muscle pain.  The patient verbalized understanding of the proper use and possible adverse effects of Libtayo.  All of the patient's questions and concerns were addressed. Otezla Pregnancy And Lactation Text: This medication is Pregnancy Category C and it isn't known if it is safe during pregnancy. It is unknown if it is excreted in breast milk. Xeljanz Counseling: I discussed with the patient the risks of Xeljanz therapy including increased risk of infection, liver issues, headache, diarrhea, or cold symptoms. Live vaccines should be avoided. They were instructed to call if they have any problems. Opioid Pregnancy And Lactation Text: These medications can lead to premature delivery and should be avoided during pregnancy. These medications are also present in breast milk in small amounts. Picato Counseling:  I discussed with the patient the risks of Picato including but not limited to erythema, scaling, itching, weeping, crusting, and pain. Detail Level: Detailed Prednisone Pregnancy And Lactation Text: This medication is Pregnancy Category C and it isn't know if it is safe during pregnancy. This medication is excreted in breast milk. Dapsone Pregnancy And Lactation Text: This medication is Pregnancy Category C and is not considered safe during pregnancy or breast feeding. Rituxan Pregnancy And Lactation Text: This medication is Pregnancy Category C and it isn't know if it is safe during pregnancy. It is unknown if this medication is excreted in breast milk but similar antibodies are known to be excreted. Valtrex Counseling: I discussed with the patient the risks of valacyclovir including but not limited to kidney damage, nausea, vomiting and severe allergy.  The patient understands that if the infection seems to be worsening or is not improving, they are to call. Enbrel Counseling:  I discussed with the patient the risks of etanercept including but not limited to myelosuppression, immunosuppression, autoimmune hepatitis, demyelinating diseases, lymphoma, and infections.  The patient understands that monitoring is required including a PPD at baseline and must alert us or the primary physician if symptoms of infection or other concerning signs are noted. Topical Sulfur Applications Counseling: Topical Sulfur Counseling: Patient counseled that this medication may cause skin irritation or allergic reactions.  In the event of skin irritation, the patient was advised to reduce the amount of the drug applied or use it less frequently.   The patient verbalized understanding of the proper use and possible adverse effects of topical sulfur application.  All of the patient's questions and concerns were addressed. Tazorac Counseling:  Patient advised that medication is irritating and drying.  Patient may need to apply sparingly and wash off after an hour before eventually leaving it on overnight.  The patient verbalized understanding of the proper use and possible adverse effects of tazorac.  All of the patient's questions and concerns were addressed. Ketoconazole Counseling:   Patient counseled regarding improving absorption with orange juice.  Adverse effects include but are not limited to breast enlargement, headache, diarrhea, nausea, upset stomach, liver function test abnormalities, taste disturbance, and stomach pain.  There is a rare possibility of liver failure that can occur when taking ketoconazole. The patient understands that monitoring of LFTs may be required, especially at baseline. The patient verbalized understanding of the proper use and possible adverse effects of ketoconazole.  All of the patient's questions and concerns were addressed. Gabapentin Counseling: I discussed with the patient the risks of gabapentin including but not limited to dizziness, somnolence, fatigue and ataxia. Siliq Counseling:  I discussed with the patient the risks of Siliq including but not limited to new or worsening depression, suicidal thoughts and behavior, immunosuppression, malignancy, posterior leukoencephalopathy syndrome, and serious infections.  The patient understands that monitoring is required including a PPD at baseline and must alert us or the primary physician if symptoms of infection or other concerning signs are noted. There is also a special program designed to monitor depression which is required with Siliq. Tranexamic Acid Pregnancy And Lactation Text: It is unknown if this medication is safe during pregnancy or breast feeding. Doxepin Counseling:  Patient advised that the medication is sedating and not to drive a car after taking this medication. Patient informed of potential adverse effects including but not limited to dry mouth, urinary retention, and blurry vision.  The patient verbalized understanding of the proper use and possible adverse effects of doxepin.  All of the patient's questions and concerns were addressed. Libtayo Pregnancy And Lactation Text: This medication is contraindicated in pregnancy and when breast feeding. Bactrim Pregnancy And Lactation Text: This medication is Pregnancy Category D and is known to cause fetal risk.  It is also excreted in breast milk. 5-Fu Counseling: 5-Fluorouracil Counseling:  I discussed with the patient the risks of 5-fluorouracil including but not limited to erythema, scaling, itching, weeping, crusting, and pain. Oxybutynin Counseling:  I discussed with the patient the risks of oxybutynin including but not limited to skin rash, drowsiness, dry mouth, difficulty urinating, and blurred vision. Sarecycline Pregnancy And Lactation Text: This medication is Pregnancy Category D and not consider safe during pregnancy. It is also excreted in breast milk. Metronidazole Counseling:  I discussed with the patient the risks of metronidazole including but not limited to seizures, nausea/vomiting, a metallic taste in the mouth, nausea/vomiting and severe allergy. Adbry Counseling: I discussed with the patient the risks of tralokinumab including but not limited to eye infection and irritation, cold sores, injection site reactions, worsening of asthma, allergic reactions and increased risk of parasitic infection.  Live vaccines should be avoided while taking tralokinumab. The patient understands that monitoring is required and they must alert us or the primary physician if symptoms of infection or other concerning signs are noted. Niacinamide Pregnancy And Lactation Text: These medications are considered safe during pregnancy. Spironolactone Counseling: Patient advised regarding risks of diarrhea, abdominal pain, hyperkalemia, birth defects (for female patients), liver toxicity and renal toxicity. The patient may need blood work to monitor liver and kidney function and potassium levels while on therapy. The patient verbalized understanding of the proper use and possible adverse effects of spironolactone.  All of the patient's questions and concerns were addressed. High Dose Vitamin A Pregnancy And Lactation Text: High dose vitamin A therapy is contraindicated during pregnancy and breast feeding. Imiquimod Counseling:  I discussed with the patient the risks of imiquimod including but not limited to erythema, scaling, itching, weeping, crusting, and pain.  Patient understands that the inflammatory response to imiquimod is variable from person to person and was educated regarded proper titration schedule.  If flu-like symptoms develop, patient knows to discontinue the medication and contact us. Nsaids Counseling: NSAID Counseling: I discussed with the patient that NSAIDs should be taken with food. Prolonged use of NSAIDs can result in the development of stomach ulcers.  Patient advised to stop taking NSAIDs if abdominal pain occurs.  The patient verbalized understanding of the proper use and possible adverse effects of NSAIDs.  All of the patient's questions and concerns were addressed. Cibinqo Counseling: I discussed with the patient the risks of Cibinqo therapy including but not limited to common cold, nausea, headache, cold sores, increased blood CPK levels, dizziness, UTIs, fatigue, acne, and vomitting. Live vaccines should be avoided.  This medication has been linked to serious infections; higher rate of mortality; malignancy and lymphoproliferative disorders; major adverse cardiovascular events; thrombosis; thrombocytopenia and lymphopenia; lipid elevations; and retinal detachment. Tremfya Counseling: I discussed with the patient the risks of guselkumab including but not limited to immunosuppression, serious infections, and drug reactions.  The patient understands that monitoring is required including a PPD at baseline and must alert us or the primary physician if symptoms of infection or other concerning signs are noted. Xelmodez Pregnancy And Lactation Text: This medication is Pregnancy Category D and is not considered safe during pregnancy.  The risk during breast feeding is also uncertain. Valtrex Pregnancy And Lactation Text: this medication is Pregnancy Category B and is considered safe during pregnancy. This medication is not directly found in breast milk but it's metabolite acyclovir is present. Topical Sulfur Applications Pregnancy And Lactation Text: This medication is considered safe during pregnancy and breast feeding secondary to limited systemic absorption. Protopic Counseling: Patient may experience a mild burning sensation during topical application. Protopic is not approved in children less than 2 years of age. There have been case reports of hematologic and skin malignancies in patients using topical calcineurin inhibitors although causality is questionable. Include Pregnancy/Lactation Warning?: No Azelaic Acid Counseling: Patient counseled that medicine may cause skin irritation and to avoid applying near the eyes.  In the event of skin irritation, the patient was advised to reduce the amount of the drug applied or use it less frequently.   The patient verbalized understanding of the proper use and possible adverse effects of azelaic acid.  All of the patient's questions and concerns were addressed. Arava Counseling:  Patient counseled regarding adverse effects of Arava including but not limited to nausea, vomiting, abnormalities in liver function tests. Patients may develop mouth sores, rash, diarrhea, and abnormalities in blood counts. The patient understands that monitoring is required including LFTs and blood counts.  There is a rare possibility of scarring of the liver and lung problems that can occur when taking methotrexate. Persistent nausea, loss of appetite, pale stools, dark urine, cough, and shortness of breath should be reported immediately. Patient advised to discontinue Arava treatment and consult with a physician prior to attempting conception. The patient will have to undergo a treatment to eliminate Arava from the body prior to conception. Humira Counseling:  I discussed with the patient the risks of adalimumab including but not limited to myelosuppression, immunosuppression, autoimmune hepatitis, demyelinating diseases, lymphoma, and serious infections.  The patient understands that monitoring is required including a PPD at baseline and must alert us or the primary physician if symptoms of infection or other concerning signs are noted. Cephalexin Counseling: I counseled the patient regarding use of cephalexin as an antibiotic for prophylactic and/or therapeutic purposes. Cephalexin (commonly prescribed under brand name Keflex) is a cephalosporin antibiotic which is active against numerous classes of bacteria, including most skin bacteria. Side effects may include nausea, diarrhea, gastrointestinal upset, rash, hives, yeast infections, and in rare cases, hepatitis, kidney disease, seizures, fever, confusion, neurologic symptoms, and others. Patients with severe allergies to penicillin medications are cautioned that there is about a 10% incidence of cross-reactivity with cephalosporins. When possible, patients with penicillin allergies should use alternatives to cephalosporins for antibiotic therapy. Wartpeel Counseling:  I discussed with the patient the risks of Wartpeel including but not limited to erythema, scaling, itching, weeping, crusting, and pain. Ketoconazole Pregnancy And Lactation Text: This medication is Pregnancy Category C and it isn't know if it is safe during pregnancy. It is also excreted in breast milk and breast feeding isn't recommended. Tazorac Pregnancy And Lactation Text: This medication is not safe during pregnancy. It is unknown if this medication is excreted in breast milk. Odomzo Counseling- I discussed with the patient the risks of Odomzo including but not limited to nausea, vomiting, diarrhea, constipation, weight loss, changes in the sense of taste, decreased appetite, muscle spasms, and hair loss.  The patient verbalized understanding of the proper use and possible adverse effects of Odomzo.  All of the patient's questions and concerns were addressed. Adbry Pregnancy And Lactation Text: It is unknown if this medication will adversely affect pregnancy or breast feeding. Metronidazole Pregnancy And Lactation Text: This medication is Pregnancy Category B and considered safe during pregnancy.  It is also excreted in breast milk. Doxepin Pregnancy And Lactation Text: This medication is Pregnancy Category C and it isn't known if it is safe during pregnancy. It is also excreted in breast milk and breast feeding isn't recommended. Tetracycline Counseling: Patient counseled regarding possible photosensitivity and increased risk for sunburn.  Patient instructed to avoid sunlight, if possible.  When exposed to sunlight, patients should wear protective clothing, sunglasses, and sunscreen.  The patient was instructed to call the office immediately if the following severe adverse effects occur:  hearing changes, easy bruising/bleeding, severe headache, or vision changes.  The patient verbalized understanding of the proper use and possible adverse effects of tetracycline.  All of the patient's questions and concerns were addressed. Patient understands to avoid pregnancy while on therapy due to potential birth defects. Spironolactone Pregnancy And Lactation Text: This medication can cause feminization of the male fetus and should be avoided during pregnancy. The active metabolite is also found in breast milk. Drysol Counseling:  I discussed with the patient the risks of drysol/aluminum chloride including but not limited to skin rash, itching, irritation, burning. Cimzia Counseling:  I discussed with the patient the risks of Cimzia including but not limited to immunosuppression, allergic reactions and infections.  The patient understands that monitoring is required including a PPD at baseline and must alert us or the primary physician if symptoms of infection or other concerning signs are noted. Hydroxyzine Counseling: Patient advised that the medication is sedating and not to drive a car after taking this medication.  Patient informed of potential adverse effects including but not limited to dry mouth, urinary retention, and blurry vision.  The patient verbalized understanding of the proper use and possible adverse effects of hydroxyzine.  All of the patient's questions and concerns were addressed. Cellcept Counseling:  I discussed with the patient the risks of mycophenolate mofetil including but not limited to infection/immunosuppression, GI upset, hypokalemia, hypercholesterolemia, bone marrow suppression, lymphoproliferative disorders, malignancy, GI ulceration/bleed/perforation, colitis, interstitial lung disease, kidney failure, progressive multifocal leukoencephalopathy, and birth defects.  The patient understands that monitoring is required including a baseline creatinine and regular CBC testing. In addition, patient must alert us immediately if symptoms of infection or other concerning signs are noted. Cibinqo Pregnancy And Lactation Text: It is unknown if this medication will adversely affect pregnancy or breast feeding.  You should not take this medication if you are currently pregnant or planning a pregnancy or while breastfeeding. Azathioprine Counseling:  I discussed with the patient the risks of azathioprine including but not limited to myelosuppression, immunosuppression, hepatotoxicity, lymphoma, and infections.  The patient understands that monitoring is required including baseline LFTs, Creatinine, possible TPMP genotyping and weekly CBCs for the first month and then every 2 weeks thereafter.  The patient verbalized understanding of the proper use and possible adverse effects of azathioprine.  All of the patient's questions and concerns were addressed. Propranolol Pregnancy And Lactation Text: This medication is Pregnancy Category C and it isn't known if it is safe during pregnancy. It is excreted in breast milk. Protopic Pregnancy And Lactation Text: This medication is Pregnancy Category C. It is unknown if this medication is excreted in breast milk when applied topically. Propranolol Counseling:  I discussed with the patient the risks of propranolol including but not limited to low heart rate, low blood pressure, low blood sugar, restlessness and increased cold sensitivity. They should call the office if they experience any of these side effects. Azelaic Acid Pregnancy And Lactation Text: This medication is considered safe during pregnancy and breast feeding. Nsaids Pregnancy And Lactation Text: These medications are considered safe up to 30 weeks gestation. It is excreted in breast milk. Cephalexin Pregnancy And Lactation Text: This medication is Pregnancy Category B and considered safe during pregnancy.  It is also excreted in breast milk but can be used safely for shorter doses. Terbinafine Counseling: Patient counseling regarding adverse effects of terbinafine including but not limited to headache, diarrhea, rash, upset stomach, liver function test abnormalities, itching, taste/smell disturbance, nausea, abdominal pain, and flatulence.  There is a rare possibility of liver failure that can occur when taking terbinafine.  The patient understands that a baseline LFT and kidney function test may be required. The patient verbalized understanding of the proper use and possible adverse effects of terbinafine.  All of the patient's questions and concerns were addressed. Glycopyrrolate Counseling:  I discussed with the patient the risks of glycopyrrolate including but not limited to skin rash, drowsiness, dry mouth, difficulty urinating, and blurred vision. Simponi Counseling:  I discussed with the patient the risks of golimumab including but not limited to myelosuppression, immunosuppression, autoimmune hepatitis, demyelinating diseases, lymphoma, and serious infections.  The patient understands that monitoring is required including a PPD at baseline and must alert us or the primary physician if symptoms of infection or other concerning signs are noted. Minocycline Counseling: Patient advised regarding possible photosensitivity and discoloration of the teeth, skin, lips, tongue and gums.  Patient instructed to avoid sunlight, if possible.  When exposed to sunlight, patients should wear protective clothing, sunglasses, and sunscreen.  The patient was instructed to call the office immediately if the following severe adverse effects occur:  hearing changes, easy bruising/bleeding, severe headache, or vision changes.  The patient verbalized understanding of the proper use and possible adverse effects of minocycline.  All of the patient's questions and concerns were addressed. Acitretin Counseling:  I discussed with the patient the risks of acitretin including but not limited to hair loss, dry lips/skin/eyes, liver damage, hyperlipidemia, depression/suicidal ideation, photosensitivity.  Serious rare side effects can include but are not limited to pancreatitis, pseudotumor cerebri, bony changes, clot formation/stroke/heart attack.  Patient understands that alcohol is contraindicated since it can result in liver toxicity and significantly prolong the elimination of the drug by many years. Azathioprine Pregnancy And Lactation Text: This medication is Pregnancy Category D and isn't considered safe during pregnancy. It is unknown if this medication is excreted in breast milk. Clindamycin Counseling: I counseled the patient regarding use of clindamycin as an antibiotic for prophylactic and/or therapeutic purposes. Clindamycin is active against numerous classes of bacteria, including skin bacteria. Side effects may include nausea, diarrhea, gastrointestinal upset, rash, hives, yeast infections, and in rare cases, colitis. Acitretin Pregnancy And Lactation Text: This medication is Pregnancy Category X and should not be given to women who are pregnant or may become pregnant in the future. This medication is excreted in breast milk. Glycopyrrolate Pregnancy And Lactation Text: This medication is Pregnancy Category B and is considered safe during pregnancy. It is unknown if it is excreted breast milk. Cimzia Pregnancy And Lactation Text: This medication crosses the placenta but can be considered safe in certain situations. Cimzia may be excreted in breast milk. Hydroxyzine Pregnancy And Lactation Text: This medication is not safe during pregnancy and should not be taken. It is also excreted in breast milk and breast feeding isn't recommended. Olumiant Counseling: I discussed with the patient the risks of Olumiant therapy including but not limited to upper respiratory tract infections, shingles, cold sores, and nausea. Live vaccines should be avoided.  This medication has been linked to serious infections; higher rate of mortality; malignancy and lymphoproliferative disorders; major adverse cardiovascular events; thrombosis; gastrointestinal perforations; neutropenia; lymphopenia; anemia; liver enzyme elevations; and lipid elevations. SSKI Counseling:  I discussed with the patient the risks of SSKI including but not limited to thyroid abnormalities, metallic taste, GI upset, fever, headache, acne, arthralgias, paraesthesias, lymphadenopathy, easy bleeding, arrhythmias, and allergic reaction. Minoxidil Counseling: Minoxidil is a topical medication which can increase blood flow where it is applied. It is uncertain how this medication increases hair growth. Side effects are uncommon and include stinging and allergic reactions. Cyclophosphamide Pregnancy And Lactation Text: This medication is Pregnancy Category D and it isn't considered safe during pregnancy. This medication is excreted in breast milk. Cyclosporine Counseling:  I discussed with the patient the risks of cyclosporine including but not limited to hypertension, gingival hyperplasia,myelosuppression, immunosuppression, liver damage, kidney damage, neurotoxicity, lymphoma, and serious infections. The patient understands that monitoring is required including baseline blood pressure, CBC, CMP, lipid panel and uric acid, and then 1-2 times monthly CMP and blood pressure. Olanzapine Counseling- I discussed with the patient the common side effects of olanzapine including but are not limited to: lack of energy, dry mouth, increased appetite, sleepiness, tremor, constipation, dizziness, changes in behavior, or restlessness.  Explained that teenagers are more likely to experience headaches, abdominal pain, pain in the arms or legs, tiredness, and sleepiness.  Serious side effects include but are not limited: increased risk of death in elderly patients who are confused, have memory loss, or dementia-related psychosis; hyperglycemia; increased cholesterol and triglycerides; and weight gain. Rhofade Counseling: Rhofade is a topical medication which can decrease superficial blood flow where applied. Side effects are uncommon and include stinging, redness and allergic reactions. Clofazimine Counseling:  I discussed with the patient the risks of clofazimine including but not limited to skin and eye pigmentation, liver damage, nausea/vomiting, gastrointestinal bleeding and allergy. Fluconazole Counseling:  Patient counseled regarding adverse effects of fluconazole including but not limited to headache, diarrhea, nausea, upset stomach, liver function test abnormalities, taste disturbance, and stomach pain.  There is a rare possibility of liver failure that can occur when taking fluconazole.  The patient understands that monitoring of LFTs and kidney function test may be required, especially at baseline. The patient verbalized understanding of the proper use and possible adverse effects of fluconazole.  All of the patient's questions and concerns were addressed. Dutasteride Male Counseling: Dustasteride Counseling:  I discussed with the patient the risks of use of dutasteride including but not limited to decreased libido, decreased ejaculate volume, and gynecomastia. Women who can become pregnant should not handle medication.  All of the patient's questions and concerns were addressed. Benzoyl Peroxide Counseling: Patient counseled that medicine may cause skin irritation and bleach clothing.  In the event of skin irritation, the patient was advised to reduce the amount of the drug applied or use it less frequently.   The patient verbalized understanding of the proper use and possible adverse effects of benzoyl peroxide.  All of the patient's questions and concerns were addressed. Ilumya Counseling: I discussed with the patient the risks of tildrakizumab including but not limited to immunosuppression, malignancy, posterior leukoencephalopathy syndrome, and serious infections.  The patient understands that monitoring is required including a PPD at baseline and must alert us or the primary physician if symptoms of infection or other concerning signs are noted. Xolair Counseling:  Patient informed of potential adverse effects including but not limited to fever, muscle aches, rash and allergic reactions.  The patient verbalized understanding of the proper use and possible adverse effects of Xolair.  All of the patient's questions and concerns were addressed. Winlevi Counseling:  I discussed with the patient the risks of topical clascoterone including but not limited to erythema, scaling, itching, and stinging. Patient voiced their understanding. Benzoyl Peroxide Pregnancy And Lactation Text: This medication is Pregnancy Category C. It is unknown if benzoyl peroxide is excreted in breast milk. Skyrizi Counseling: I discussed with the patient the risks of risankizumab-rzaa including but not limited to immunosuppression, and serious infections.  The patient understands that monitoring is required including a PPD at baseline and must alert us or the primary physician if symptoms of infection or other concerning signs are noted. Sski Pregnancy And Lactation Text: This medication is Pregnancy Category D and isn't considered safe during pregnancy. It is excreted in breast milk. Bexarotene Counseling:  I discussed with the patient the risks of bexarotene including but not limited to hair loss, dry lips/skin/eyes, liver abnormalities, hyperlipidemia, pancreatitis, depression/suicidal ideation, photosensitivity, drug rash/allergic reactions, hypothyroidism, anemia, leukopenia, infection, cataracts, and teratogenicity.  Patient understands that they will need regular blood tests to check lipid profile, liver function tests, white blood cell count, thyroid function tests and pregnancy test if applicable. Clindamycin Pregnancy And Lactation Text: This medication can be used in pregnancy if certain situations. Clindamycin is also present in breast milk. Winlevi Pregnancy And Lactation Text: This medication is considered safe during pregnancy and breastfeeding. Elidel Counseling: Patient may experience a mild burning sensation during topical application. Elidel is not approved in children less than 2 years of age. There have been case reports of hematologic and skin malignancies in patients using topical calcineurin inhibitors although causality is questionable. Hydroxychloroquine Counseling:  I discussed with the patient that a baseline ophthalmologic exam is needed at the start of therapy and every year thereafter while on therapy. A CBC may also be warranted for monitoring.  The side effects of this medication were discussed with the patient, including but not limited to agranulocytosis, aplastic anemia, seizures, rashes, retinopathy, and liver toxicity. Patient instructed to call the office should any adverse effect occur.  The patient verbalized understanding of the proper use and possible adverse effects of Plaquenil.  All the patient's questions and concerns were addressed. Olumiant Pregnancy And Lactation Text: Based on animal studies, Olumiant may cause embryo-fetal harm when administered to pregnant women.  The medication should not be used in pregnancy.  Breastfeeding is not recommended during treatment. Quinolones Counseling:  I discussed with the patient the risks of fluoroquinolones including but not limited to GI upset, allergic reaction, drug rash, diarrhea, dizziness, photosensitivity, yeast infections, liver function test abnormalities, tendonitis/tendon rupture. Cosentyx Counseling:  I discussed with the patient the risks of Cosentyx including but not limited to worsening of Crohn's disease, immunosuppression, allergic reactions and infections.  The patient understands that monitoring is required including a PPD at baseline and must alert us or the primary physician if symptoms of infection or other concerning signs are noted. Oral Minoxidil Counseling- I discussed with the patient the risks of oral minoxidil including but not limited to shortness of breath, swelling of the feet or ankles, dizziness, lightheadedness, unwanted hair growth and allergic reaction.  The patient verbalized understanding of the proper use and possible adverse effects of oral minoxidil.  All of the patient's questions and concerns were addressed. Rinvoq Counseling: I discussed with the patient the risks of Rinvoq therapy including but not limited to upper respiratory tract infections, shingles, cold sores, bronchitis, nausea, cough, fever, acne, and headache. Live vaccines should be avoided.  This medication has been linked to serious infections; higher rate of mortality; malignancy and lymphoproliferative disorders; major adverse cardiovascular events; thrombosis; thrombocytopenia, anemia, and neutropenia; lipid elevations; liver enzyme elevations; and gastrointestinal perforations. Olanzapine Pregnancy And Lactation Text: This medication is pregnancy category C.   There are no adequate and well controlled trials with olanzapine in pregnant females.  Olanzapine should be used during pregnancy only if the potential benefit justifies the potential risk to the fetus.   In a study in lactating healthy women, olanzapine was excreted in breast milk.  It is recommended that women taking olanzapine should not breast feed. Dutasteride Pregnancy And Lactation Text: This medication is absolutely contraindicated in women, especially during pregnancy and breast feeding. Feminization of male fetuses is possible if taking while pregnant. Cyclophosphamide Counseling:  I discussed with the patient the risks of cyclophosphamide including but not limited to hair loss, hormonal abnormalities, decreased fertility, abdominal pain, diarrhea, nausea and vomiting, bone marrow suppression and infection. The patient understands that monitoring is required while taking this medication. Mirvaso Counseling: Mirvaso is a topical medication which can decrease superficial blood flow where applied. Side effects are uncommon and include stinging, redness and allergic reactions. Xolair Pregnancy And Lactation Text: This medication is Pregnancy Category B and is considered safe during pregnancy. This medication is excreted in breast milk. Topical Clindamycin Counseling: Patient counseled that this medication may cause skin irritation or allergic reactions.  In the event of skin irritation, the patient was advised to reduce the amount of the drug applied or use it less frequently.   The patient verbalized understanding of the proper use and possible adverse effects of clindamycin.  All of the patient's questions and concerns were addressed. Albendazole Counseling:  I discussed with the patient the risks of albendazole including but not limited to cytopenia, kidney damage, nausea/vomiting and severe allergy.  The patient understands that this medication is being used in an off-label manner.